# Patient Record
Sex: MALE | Race: WHITE | Employment: OTHER | URBAN - METROPOLITAN AREA
[De-identification: names, ages, dates, MRNs, and addresses within clinical notes are randomized per-mention and may not be internally consistent; named-entity substitution may affect disease eponyms.]

---

## 2017-06-30 ENCOUNTER — TELEPHONE (OUTPATIENT)
Dept: INTERNAL MEDICINE | Age: 72
End: 2017-06-30

## 2017-07-03 DIAGNOSIS — Z00.00 WELL ADULT EXAM: ICD-10-CM

## 2017-07-03 DIAGNOSIS — E78.5 HYPERLIPIDEMIA, UNSPECIFIED HYPERLIPIDEMIA TYPE: Primary | ICD-10-CM

## 2017-07-03 DIAGNOSIS — Z12.5 SCREENING PSA (PROSTATE SPECIFIC ANTIGEN): ICD-10-CM

## 2017-07-03 DIAGNOSIS — E55.9 VITAMIN D DEFICIENCY: ICD-10-CM

## 2017-07-03 DIAGNOSIS — R53.82 CHRONIC FATIGUE: ICD-10-CM

## 2017-07-07 DIAGNOSIS — E55.9 VITAMIN D DEFICIENCY: ICD-10-CM

## 2017-07-07 DIAGNOSIS — Z00.00 WELL ADULT EXAM: ICD-10-CM

## 2017-07-07 DIAGNOSIS — R53.82 CHRONIC FATIGUE: ICD-10-CM

## 2017-07-07 DIAGNOSIS — E78.5 HYPERLIPIDEMIA, UNSPECIFIED HYPERLIPIDEMIA TYPE: ICD-10-CM

## 2017-07-07 LAB
A/G RATIO: 1.6 (ref 1.1–2.2)
ALBUMIN SERPL-MCNC: 4.4 G/DL (ref 3.4–5)
ALP BLD-CCNC: 62 U/L (ref 40–129)
ALT SERPL-CCNC: 15 U/L (ref 10–40)
ANION GAP SERPL CALCULATED.3IONS-SCNC: 11 MMOL/L (ref 3–16)
AST SERPL-CCNC: 15 U/L (ref 15–37)
BASOPHILS ABSOLUTE: 0 K/UL (ref 0–0.2)
BASOPHILS RELATIVE PERCENT: 0.8 %
BILIRUB SERPL-MCNC: 0.7 MG/DL (ref 0–1)
BILIRUBIN URINE: NEGATIVE
BLOOD, URINE: NEGATIVE
BUN BLDV-MCNC: 17 MG/DL (ref 7–20)
CALCIUM SERPL-MCNC: 9.6 MG/DL (ref 8.3–10.6)
CHLORIDE BLD-SCNC: 102 MMOL/L (ref 99–110)
CHOLESTEROL, TOTAL: 202 MG/DL (ref 0–199)
CLARITY: CLEAR
CO2: 28 MMOL/L (ref 21–32)
COLOR: YELLOW
CREAT SERPL-MCNC: 1.1 MG/DL (ref 0.8–1.3)
EOSINOPHILS ABSOLUTE: 0.3 K/UL (ref 0–0.6)
EOSINOPHILS RELATIVE PERCENT: 5.7 %
GFR AFRICAN AMERICAN: >60
GFR NON-AFRICAN AMERICAN: >60
GLOBULIN: 2.7 G/DL
GLUCOSE BLD-MCNC: 102 MG/DL (ref 70–99)
GLUCOSE URINE: NEGATIVE MG/DL
HCT VFR BLD CALC: 45.6 % (ref 40.5–52.5)
HDLC SERPL-MCNC: 47 MG/DL (ref 40–60)
HEMOGLOBIN: 15.4 G/DL (ref 13.5–17.5)
KETONES, URINE: NEGATIVE MG/DL
LDL CHOLESTEROL CALCULATED: 131 MG/DL
LEUKOCYTE ESTERASE, URINE: NEGATIVE
LYMPHOCYTES ABSOLUTE: 1.7 K/UL (ref 1–5.1)
LYMPHOCYTES RELATIVE PERCENT: 30.5 %
MCH RBC QN AUTO: 30.4 PG (ref 26–34)
MCHC RBC AUTO-ENTMCNC: 33.8 G/DL (ref 31–36)
MCV RBC AUTO: 90.1 FL (ref 80–100)
MICROSCOPIC EXAMINATION: NORMAL
MONOCYTES ABSOLUTE: 0.6 K/UL (ref 0–1.3)
MONOCYTES RELATIVE PERCENT: 10 %
NEUTROPHILS ABSOLUTE: 3 K/UL (ref 1.7–7.7)
NEUTROPHILS RELATIVE PERCENT: 53 %
NITRITE, URINE: NEGATIVE
PDW BLD-RTO: 13.4 % (ref 12.4–15.4)
PH UA: 5.5
PLATELET # BLD: 202 K/UL (ref 135–450)
PMV BLD AUTO: 9.7 FL (ref 5–10.5)
POTASSIUM SERPL-SCNC: 4.8 MMOL/L (ref 3.5–5.1)
PROTEIN UA: NEGATIVE MG/DL
RBC # BLD: 5.07 M/UL (ref 4.2–5.9)
SODIUM BLD-SCNC: 141 MMOL/L (ref 136–145)
SPECIFIC GRAVITY UA: 1.02
TOTAL PROTEIN: 7.1 G/DL (ref 6.4–8.2)
TRIGL SERPL-MCNC: 122 MG/DL (ref 0–150)
TSH SERPL DL<=0.05 MIU/L-ACNC: 2.6 UIU/ML (ref 0.27–4.2)
URINE TYPE: NORMAL
UROBILINOGEN, URINE: 0.2 E.U./DL
VITAMIN D 25-HYDROXY: 31.5 NG/ML
VLDLC SERPL CALC-MCNC: 24 MG/DL
WBC # BLD: 5.7 K/UL (ref 4–11)

## 2017-07-14 ENCOUNTER — OFFICE VISIT (OUTPATIENT)
Dept: ENT CLINIC | Age: 72
End: 2017-07-14

## 2017-07-14 ENCOUNTER — OFFICE VISIT (OUTPATIENT)
Dept: INTERNAL MEDICINE | Age: 72
End: 2017-07-14

## 2017-07-14 VITALS
HEIGHT: 72 IN | WEIGHT: 204.4 LBS | SYSTOLIC BLOOD PRESSURE: 120 MMHG | BODY MASS INDEX: 27.68 KG/M2 | HEART RATE: 68 BPM | RESPIRATION RATE: 14 BRPM | DIASTOLIC BLOOD PRESSURE: 82 MMHG

## 2017-07-14 VITALS — BODY MASS INDEX: 26.9 KG/M2 | HEIGHT: 73 IN | TEMPERATURE: 98.1 F | WEIGHT: 203 LBS

## 2017-07-14 DIAGNOSIS — E78.5 HYPERLIPIDEMIA, UNSPECIFIED HYPERLIPIDEMIA TYPE: ICD-10-CM

## 2017-07-14 DIAGNOSIS — N20.0 NEPHROLITHIASIS: ICD-10-CM

## 2017-07-14 DIAGNOSIS — Z00.00 ROUTINE GENERAL MEDICAL EXAMINATION AT A HEALTH CARE FACILITY: ICD-10-CM

## 2017-07-14 DIAGNOSIS — H93.8X1 FULLNESS IN EAR, RIGHT: Primary | ICD-10-CM

## 2017-07-14 DIAGNOSIS — Z00.00 WELL ADULT EXAM: Primary | ICD-10-CM

## 2017-07-14 DIAGNOSIS — E55.9 VITAMIN D DEFICIENCY: ICD-10-CM

## 2017-07-14 PROCEDURE — 99203 OFFICE O/P NEW LOW 30 MIN: CPT | Performed by: OTOLARYNGOLOGY

## 2017-07-14 PROCEDURE — 99214 OFFICE O/P EST MOD 30 MIN: CPT | Performed by: INTERNAL MEDICINE

## 2017-07-14 PROCEDURE — G0439 PPPS, SUBSEQ VISIT: HCPCS | Performed by: INTERNAL MEDICINE

## 2017-07-14 ASSESSMENT — LIFESTYLE VARIABLES
HAVE YOU OR SOMEONE ELSE BEEN INJURED AS A RESULT OF YOUR DRINKING: 0
HOW OFTEN DO YOU HAVE SIX OR MORE DRINKS ON ONE OCCASION: 0
HAS A RELATIVE, FRIEND, DOCTOR, OR ANOTHER HEALTH PROFESSIONAL EXPRESSED CONCERN ABOUT YOUR DRINKING OR SUGGESTED YOU CUT DOWN: 0
AUDIT TOTAL SCORE: 2
HOW MANY STANDARD DRINKS CONTAINING ALCOHOL DO YOU HAVE ON A TYPICAL DAY: 0
HOW OFTEN DURING THE LAST YEAR HAVE YOU FOUND THAT YOU WERE NOT ABLE TO STOP DRINKING ONCE YOU HAD STARTED: 0
AUDIT-C TOTAL SCORE: 2
HOW OFTEN DURING THE LAST YEAR HAVE YOU FAILED TO DO WHAT WAS NORMALLY EXPECTED FROM YOU BECAUSE OF DRINKING: 0
HOW OFTEN DURING THE LAST YEAR HAVE YOU BEEN UNABLE TO REMEMBER WHAT HAPPENED THE NIGHT BEFORE BECAUSE YOU HAD BEEN DRINKING: 0
HOW OFTEN DO YOU HAVE A DRINK CONTAINING ALCOHOL: 2
HOW OFTEN DURING THE LAST YEAR HAVE YOU NEEDED AN ALCOHOLIC DRINK FIRST THING IN THE MORNING TO GET YOURSELF GOING AFTER A NIGHT OF HEAVY DRINKING: 0
HOW OFTEN DURING THE LAST YEAR HAVE YOU HAD A FEELING OF GUILT OR REMORSE AFTER DRINKING: 0

## 2017-07-14 ASSESSMENT — ENCOUNTER SYMPTOMS
GASTROINTESTINAL NEGATIVE: 1
ALLERGIC/IMMUNOLOGIC NEGATIVE: 1
EYES NEGATIVE: 1
RESPIRATORY NEGATIVE: 1

## 2017-07-14 ASSESSMENT — ANXIETY QUESTIONNAIRES: GAD7 TOTAL SCORE: 0

## 2017-07-14 ASSESSMENT — PATIENT HEALTH QUESTIONNAIRE - PHQ9: SUM OF ALL RESPONSES TO PHQ QUESTIONS 1-9: 0

## 2017-10-04 RX ORDER — ATORVASTATIN CALCIUM 40 MG/1
TABLET, FILM COATED ORAL
Qty: 90 TABLET | Refills: 2 | Status: SHIPPED | OUTPATIENT
Start: 2017-10-04 | End: 2018-07-16 | Stop reason: SDUPTHER

## 2018-06-28 ENCOUNTER — TELEPHONE (OUTPATIENT)
Dept: INTERNAL MEDICINE | Age: 73
End: 2018-06-28

## 2018-06-28 DIAGNOSIS — E78.5 HYPERLIPIDEMIA, UNSPECIFIED HYPERLIPIDEMIA TYPE: Primary | ICD-10-CM

## 2018-06-28 DIAGNOSIS — N20.0 NEPHROLITHIASIS: ICD-10-CM

## 2018-06-28 DIAGNOSIS — E55.9 VITAMIN D DEFICIENCY: ICD-10-CM

## 2018-06-28 DIAGNOSIS — R53.82 CHRONIC FATIGUE: ICD-10-CM

## 2018-06-28 DIAGNOSIS — Z00.00 WELL ADULT EXAM: ICD-10-CM

## 2018-07-10 DIAGNOSIS — N20.0 NEPHROLITHIASIS: ICD-10-CM

## 2018-07-10 DIAGNOSIS — E78.5 HYPERLIPIDEMIA, UNSPECIFIED HYPERLIPIDEMIA TYPE: ICD-10-CM

## 2018-07-10 DIAGNOSIS — Z00.00 WELL ADULT EXAM: ICD-10-CM

## 2018-07-10 DIAGNOSIS — E55.9 VITAMIN D DEFICIENCY: ICD-10-CM

## 2018-07-10 DIAGNOSIS — R53.82 CHRONIC FATIGUE: ICD-10-CM

## 2018-07-10 LAB
A/G RATIO: 1.6 (ref 1.1–2.2)
ALBUMIN SERPL-MCNC: 4.1 G/DL (ref 3.4–5)
ALP BLD-CCNC: 58 U/L (ref 40–129)
ALT SERPL-CCNC: 13 U/L (ref 10–40)
ANION GAP SERPL CALCULATED.3IONS-SCNC: 10 MMOL/L (ref 3–16)
AST SERPL-CCNC: 15 U/L (ref 15–37)
BILIRUB SERPL-MCNC: 0.5 MG/DL (ref 0–1)
BILIRUBIN URINE: NEGATIVE
BLOOD, URINE: NEGATIVE
BUN BLDV-MCNC: 19 MG/DL (ref 7–20)
CALCIUM SERPL-MCNC: 9 MG/DL (ref 8.3–10.6)
CHLORIDE BLD-SCNC: 104 MMOL/L (ref 99–110)
CHOLESTEROL, TOTAL: 240 MG/DL (ref 0–199)
CLARITY: CLEAR
CO2: 28 MMOL/L (ref 21–32)
COLOR: NORMAL
CREAT SERPL-MCNC: 1.3 MG/DL (ref 0.8–1.3)
EPITHELIAL CELLS, UA: 1 /HPF (ref 0–5)
GFR AFRICAN AMERICAN: >60
GFR NON-AFRICAN AMERICAN: 54
GLOBULIN: 2.6 G/DL
GLUCOSE BLD-MCNC: 103 MG/DL (ref 70–99)
GLUCOSE URINE: NEGATIVE MG/DL
HCT VFR BLD CALC: 42.4 % (ref 40.5–52.5)
HDLC SERPL-MCNC: 45 MG/DL (ref 40–60)
HEMOGLOBIN: 14.7 G/DL (ref 13.5–17.5)
HYALINE CASTS: 2 /LPF (ref 0–8)
KETONES, URINE: NEGATIVE MG/DL
LDL CHOLESTEROL CALCULATED: 161 MG/DL
LEUKOCYTE ESTERASE, URINE: NEGATIVE
MCH RBC QN AUTO: 30.7 PG (ref 26–34)
MCHC RBC AUTO-ENTMCNC: 34.6 G/DL (ref 31–36)
MCV RBC AUTO: 88.7 FL (ref 80–100)
MICROSCOPIC EXAMINATION: NORMAL
NITRITE, URINE: NEGATIVE
PDW BLD-RTO: 13.6 % (ref 12.4–15.4)
PH UA: 5.5
PLATELET # BLD: 200 K/UL (ref 135–450)
PMV BLD AUTO: 9.4 FL (ref 5–10.5)
POTASSIUM SERPL-SCNC: 4.8 MMOL/L (ref 3.5–5.1)
PROTEIN UA: NEGATIVE MG/DL
RBC # BLD: 4.78 M/UL (ref 4.2–5.9)
RBC UA: 2 /HPF (ref 0–4)
SODIUM BLD-SCNC: 142 MMOL/L (ref 136–145)
SPECIFIC GRAVITY UA: 1.03
TOTAL PROTEIN: 6.7 G/DL (ref 6.4–8.2)
TRIGL SERPL-MCNC: 168 MG/DL (ref 0–150)
TSH REFLEX: 2.22 UIU/ML (ref 0.27–4.2)
UROBILINOGEN, URINE: 0.2 E.U./DL
VITAMIN D 25-HYDROXY: 36.9 NG/ML
VLDLC SERPL CALC-MCNC: 34 MG/DL
WBC # BLD: 5.5 K/UL (ref 4–11)
WBC UA: 1 /HPF (ref 0–5)

## 2018-07-16 ENCOUNTER — OFFICE VISIT (OUTPATIENT)
Dept: INTERNAL MEDICINE | Age: 73
End: 2018-07-16

## 2018-07-16 VITALS
DIASTOLIC BLOOD PRESSURE: 78 MMHG | HEART RATE: 68 BPM | BODY MASS INDEX: 25.8 KG/M2 | SYSTOLIC BLOOD PRESSURE: 118 MMHG | RESPIRATION RATE: 16 BRPM | WEIGHT: 201 LBS | HEIGHT: 74 IN

## 2018-07-16 DIAGNOSIS — E78.5 HYPERLIPIDEMIA, UNSPECIFIED HYPERLIPIDEMIA TYPE: ICD-10-CM

## 2018-07-16 DIAGNOSIS — Z00.00 WELL ADULT EXAM: Primary | ICD-10-CM

## 2018-07-16 DIAGNOSIS — Z12.12 SCREENING FOR MALIGNANT NEOPLASM OF THE RECTUM: ICD-10-CM

## 2018-07-16 DIAGNOSIS — N20.0 NEPHROLITHIASIS: ICD-10-CM

## 2018-07-16 DIAGNOSIS — R53.82 CHRONIC FATIGUE: ICD-10-CM

## 2018-07-16 DIAGNOSIS — Z00.00 ROUTINE GENERAL MEDICAL EXAMINATION AT A HEALTH CARE FACILITY: ICD-10-CM

## 2018-07-16 DIAGNOSIS — E55.9 VITAMIN D DEFICIENCY: ICD-10-CM

## 2018-07-16 DIAGNOSIS — R42 VERTIGO: ICD-10-CM

## 2018-07-16 LAB
HEMOCCULT STL QL: NEGATIVE

## 2018-07-16 PROCEDURE — 1036F TOBACCO NON-USER: CPT | Performed by: INTERNAL MEDICINE

## 2018-07-16 PROCEDURE — 4040F PNEUMOC VAC/ADMIN/RCVD: CPT | Performed by: INTERNAL MEDICINE

## 2018-07-16 PROCEDURE — G8427 DOCREV CUR MEDS BY ELIG CLIN: HCPCS | Performed by: INTERNAL MEDICINE

## 2018-07-16 PROCEDURE — 82270 OCCULT BLOOD FECES: CPT | Performed by: INTERNAL MEDICINE

## 2018-07-16 PROCEDURE — 1123F ACP DISCUSS/DSCN MKR DOCD: CPT | Performed by: INTERNAL MEDICINE

## 2018-07-16 PROCEDURE — G0439 PPPS, SUBSEQ VISIT: HCPCS | Performed by: INTERNAL MEDICINE

## 2018-07-16 PROCEDURE — 1101F PT FALLS ASSESS-DOCD LE1/YR: CPT | Performed by: INTERNAL MEDICINE

## 2018-07-16 PROCEDURE — 99214 OFFICE O/P EST MOD 30 MIN: CPT | Performed by: INTERNAL MEDICINE

## 2018-07-16 PROCEDURE — G8419 CALC BMI OUT NRM PARAM NOF/U: HCPCS | Performed by: INTERNAL MEDICINE

## 2018-07-16 PROCEDURE — 3017F COLORECTAL CA SCREEN DOC REV: CPT | Performed by: INTERNAL MEDICINE

## 2018-07-16 RX ORDER — ATORVASTATIN CALCIUM 20 MG/1
TABLET, FILM COATED ORAL
Qty: 90 TABLET | Refills: 1 | Status: SHIPPED | OUTPATIENT
Start: 2018-07-16 | End: 2019-01-09 | Stop reason: DRUGHIGH

## 2018-07-16 RX ORDER — MELOXICAM 7.5 MG/1
7.5 TABLET ORAL DAILY
Qty: 30 TABLET | Refills: 3 | Status: SHIPPED | OUTPATIENT
Start: 2018-07-16 | End: 2019-03-12 | Stop reason: SDUPTHER

## 2018-07-16 RX ORDER — ACETAMINOPHEN, ASPIRIN AND CAFFEINE 250; 250; 65 MG/1; MG/1; MG/1
1 TABLET, FILM COATED ORAL EVERY 6 HOURS PRN
COMMUNITY
End: 2021-02-17 | Stop reason: ALTCHOICE

## 2018-07-16 RX ORDER — HYDROCODONE BITARTRATE AND ACETAMINOPHEN 5; 325 MG/1; MG/1
1 TABLET ORAL EVERY 6 HOURS PRN
Qty: 28 TABLET | Refills: 0 | Status: SHIPPED | OUTPATIENT
Start: 2018-07-16 | End: 2018-07-23

## 2018-07-16 ASSESSMENT — PATIENT HEALTH QUESTIONNAIRE - PHQ9: SUM OF ALL RESPONSES TO PHQ QUESTIONS 1-9: 0

## 2018-07-16 ASSESSMENT — ANXIETY QUESTIONNAIRES: GAD7 TOTAL SCORE: 0

## 2018-07-16 ASSESSMENT — ENCOUNTER SYMPTOMS
EYES NEGATIVE: 1
RESPIRATORY NEGATIVE: 1
GASTROINTESTINAL NEGATIVE: 1
ALLERGIC/IMMUNOLOGIC NEGATIVE: 1

## 2018-07-16 ASSESSMENT — LIFESTYLE VARIABLES
HOW OFTEN DO YOU HAVE A DRINK CONTAINING ALCOHOL: 2
HOW OFTEN DO YOU HAVE SIX OR MORE DRINKS ON ONE OCCASION: 0

## 2018-07-16 NOTE — PROGRESS NOTES
Occupational History    Not on file. Social History Main Topics    Smoking status: Never Smoker    Smokeless tobacco: Never Used    Alcohol use Yes      Comment: OCCASIONALLY    Drug use: No    Sexual activity: Not on file     Other Topics Concern    Not on file     Social History Narrative    No narrative on file       Review of Systems  Review of Systems   Constitutional: Positive for unexpected weight change (down a few # ). HENT: Negative. Eyes: Negative. Glasses  Early cataracts    Respiratory: Negative. Cardiovascular: Negative. Gastrointestinal: Negative. Colonoscopy was ok repeat 2023   Endocrine: Negative. Genitourinary: Positive for frequency (occ nocturia ). Episode of renal colic as noted prn meds    Musculoskeletal: Positive for arthralgias (occ knee pain ). Skin: Negative. Allergic/Immunologic: Negative. Neurological: Positive for dizziness (occ sx  occ balance . sx ). Hematological: Negative. Psychiatric/Behavioral: Negative. Objective:   Physical Exam:  Physical Exam   Constitutional: He is oriented to person, place, and time. He appears well-developed and well-nourished. HENT:   Head: Normocephalic and atraumatic. Right Ear: External ear normal.   Left Ear: External ear normal.   Nose: Nose normal.   Mouth/Throat: Oropharynx is clear and moist.   Eyes: Conjunctivae and EOM are normal. Pupils are equal, round, and reactive to light. Neck: Normal range of motion. Neck supple. No tracheal deviation present. No thyromegaly present. Cardiovascular: Normal rate, regular rhythm, normal heart sounds and intact distal pulses. Pulmonary/Chest: Effort normal and breath sounds normal.   Abdominal: Soft. Bowel sounds are normal.   Genitourinary: Prostate normal and penis normal. Rectal exam shows guaiac negative stool. No penile tenderness. Musculoskeletal: Normal range of motion.    Neurological: He is alert and oriented to person, place, and time. He has normal reflexes. Skin: Skin is warm and dry. Psychiatric: He has a normal mood and affect. His behavior is normal.       /78 (Site: Right Arm, Position: Sitting, Cuff Size: Medium Adult)   Pulse 68   Resp 16   Ht 6' 2\" (1.88 m)   Wt 201 lb (91.2 kg)   BMI 25.81 kg/m²       Assessment & Plan:          Chronic fatigue  Last labs ok     Hyperlipidemia  Off med x 1 to start Lipitor at 20 mg again Repeat labs in 6 months      Nephrolithiasis  No recent symptoms  To have some Norco on hand will refill     Vitamin D deficiency  Continue current meds     Vertigo  Send to [facility] center consider ENT     Well adult exam  Within normal limits for age- cont to work no ADL issues,immunizations up to date, no depression ,no cognitive impairment  Colonoscopy up to date  Eye exam up to date  Exercises as tolerated    No living will but does not want resuscitation   Findings and recommendations discussed with Pt      Ishmael Hodge  1945    No Known Allergies  Current Outpatient Prescriptions   Medication Sig Dispense Refill    aspirin-acetaminophen-caffeine (EXCEDRIN MIGRAINE) 250-250-65 MG per tablet Take 1 tablet by mouth every 6 hours as needed for Headaches      atorvastatin (LIPITOR) 20 MG tablet TAKE ONE TABLET BY MOUTH DAILY 90 tablet 1    meloxicam (MOBIC) 7.5 MG tablet Take 1 tablet by mouth daily 30 tablet 3    HYDROcodone-acetaminophen (NORCO) 5-325 MG per tablet Take 1 tablet by mouth every 6 hours as needed for Pain for up to 7 days. . 28 tablet 0    ondansetron (ZOFRAN) 4 MG tablet Take 1 tablet by mouth every 8 hours as needed for Nausea. 6 tablet 2    Vitamin D (CHOLECALCIFEROL) 1000 UNITS CAPS capsule Take 1,000 Units by mouth daily.  aspirin 81 MG EC tablet Take 81 mg by mouth daily.  Saw [place] 450 MG CAPS Take 1 tablet by mouth 2 times daily.         Misc Natural Products (OSTEO BI-FLEX ADV TRIPLE ST) TABS Take 1 tablet by mouth 2 times daily.        Coenzyme Q-10 100 MG CAPS Take 1 tablet by mouth daily.  Multiple Vitamin (MULTI VITAMIN MENS PO) Take  by mouth. No current facility-administered medications for this visit. Vitals:    07/16/18 1414   BP: 118/78   Site: Right Arm   Position: Sitting   Cuff Size: Medium Adult   Pulse: 68   Resp: 16   Weight: 201 lb (91.2 kg)   Height: 6' 2\" (1.88 m)     Body mass index is 25.81 kg/m². Wt Readings from Last 3 Encounters:   07/16/18 201 lb (91.2 kg)   07/14/17 203 lb (92.1 kg)   07/14/17 204 lb 6.4 oz (92.7 kg)     BP Readings from Last 3 Encounters:   07/16/18 118/78   07/14/17 120/82   07/12/16 122/68       Consultants:   Patient Care Team:  Tyler Robles MD as PCP - General (Internal Medicine)  Papito Ramirez DPM as Consulting Physician (Podiatry)  Kendell Gates MD as Consulting Physician (Otolaryngology)    Chief Complaint:   Lois Townsend is a 81 y.o. male who presents for Medicare Preventive Physical Examination with Personalized Prevention Plan Services. HPI: Tr review listed chronic problems     Patient Active Problem List   Diagnosis    Hyperlipidemia    Well adult exam    Vitamin D deficiency    Nephrolithiasis    Chronic fatigue    Vertigo       Mood Disorders Screen:  Risk factors: none  Symptoms:  endorses none, denies depressed mood, difficulty concentrating, difficulty sleeping, reduced interest in activities and changes in appetite     Safety Assessment:  Functional ability/ADLs:  Difficulty with bathing- no, grooming- no, meals- no, incontinence- no.  Driving- yes. Home safety: Lives with family wife. Number of stairs to enter home: 2, within home: 12 between floors. Risk factors for falls: vestibular disturbance vertigo. Home environment modifications:  no.     End of Life Planning:  Advanced Directive: has an advanced directive - a copy has been provided.       Preventive Care:  Self-testicular exams: Yes  Last PSA: 1.26, normal  Last tablet by mouth daily      Medicare Annual Wellness Visit  Name: Liliana Torres Date: 2018   MRN: [id number] Sex: Male   Age: 81 y.o. Ethnicity: Declined   : 1945 Race: Chance Vincent is here for Medicare AWV and Discuss Labs    Screenings for behavioral, psychosocial and functional/safety risks, and cognitive dysfunction are all negative except as indicated below. These results, as well as other patient data from the [address] form, are documented in Flowsheets linked to this Encounter. No Known Allergies  Prior to Visit Medications    Medication Sig Taking? Authorizing Provider   aspirin-acetaminophen-caffeine (EXCEDRIN MIGRAINE) 105-035-94 MG per tablet Take 1 tablet by mouth every 6 hours as needed for Headaches Yes Historical Provider, MD   atorvastatin (LIPITOR) 20 MG tablet TAKE ONE TABLET BY MOUTH DAILY Yes Ellaree Riedel, MD   meloxicam (MOBIC) 7.5 MG tablet Take 1 tablet by mouth daily Yes Ellaree Riedel, MD   HYDROcodone-acetaminophen (NORCO) 5-325 MG per tablet Take 1 tablet by mouth every 6 hours as needed for Pain for up to 7 days. Samuel Heard MD   ondansetron (ZOFRAN) 4 MG tablet Take 1 tablet by mouth every 8 hours as needed for Nausea. Yes Josy Dudley MD   Vitamin D (CHOLECALCIFEROL) 1000 UNITS CAPS capsule Take 1,000 Units by mouth daily. Yes Historical Provider, MD   aspirin 81 MG EC tablet Take 81 mg by mouth daily. Yes Historical Provider, MD [place] 450 MG CAPS Take 1 tablet by mouth 2 times daily. Yes Historical Provider, MD   Misc Natural Products (OSTEO BI-FLEX ADV TRIPLE ST) TABS Take 1 tablet by mouth 2 times daily. Yes Historical Provider, MD   Coenzyme Q-10 100 MG CAPS Take 1 tablet by mouth daily. Yes Historical Provider, MD   Multiple Vitamin (MULTI VITAMIN MENS PO) Take  by mouth.    Yes Historical Provider, MD     Past Medical History:   Diagnosis Date    Cephalgia     Hyperlipidemia     Left knee pain Past Surgical History:   Procedure Laterality Date    COLONOSCOPY  3/2013    normal Dr Diamond Suarez repeat 2023    KNEE CARTILAGE SURGERY  1980\"S    LEFT KNEE    PATELLA SURGERY  1980'S    LEFT    TONSILLECTOMY AND ADENOIDECTOMY  1952     Family History   Problem Relation Age of Onset    Cancer Father         LIVER       CareTeam (Including outside providers/suppliers regularly involved in providing care):   Patient Care Team:  Abel Meneses MD as PCP - General (Internal Medicine)  Keisha Miller DPM as Consulting Physician (Podiatry)  Cary Mirza MD as Consulting Physician (Otolaryngology)    Wt Readings from Last 3 Encounters:   07/16/18 201 lb (91.2 kg)   07/14/17 203 lb (92.1 kg)   07/14/17 204 lb 6.4 oz (92.7 kg)     Vitals:    07/16/18 1414   BP: 118/78   Site: Right Arm   Position: Sitting   Cuff Size: Medium Adult   Pulse: 68   Resp: 16   Weight: 201 lb (91.2 kg)   Height: 6' 2\" (1.88 m)       See above    Patient's complete Health Risk Assessment and screening values have been reviewed and are found in Flowsheets. The following problems were reviewed today and where indicated follow up appointments were made and/or referrals ordered. Positive Risk Factor Screenings with Interventions:     Health Habits/Nutrition:  Health Habits/Nutrition  Do you exercise for at least 20 minutes 2-3 times per week?: Yes  Have you lost any weight without trying in the past 3 months?: (!) Yes  Do you eat fewer than 2 meals per day?: No  Have you seen a dentist within the past year?: Yes  Body mass index is 25.81 kg/m².   Health Habits/Nutrition Interventions:  · None indicated    Hearing/Vision:  Hearing/Vision  Do you or your family notice any trouble with your hearing?: (!) Yes  Do you have difficulty driving, watching TV, or doing any of your daily activities because of your eyesight?: (!) Yes  Have you had an eye exam within the past year?: (!) No  Hearing/Vision Interventions:  · None

## 2018-07-16 NOTE — PATIENT INSTRUCTIONS
Personalized Preventive Plan for Lobo Luis - 7/16/2018  Medicare offers a range of preventive health benefits. Some of the tests and screenings are paid in full while other may be subject to a deductible, co-insurance, and/or copay. Some of these benefits include a comprehensive review of your medical history including lifestyle, illnesses that may run in your family, and various assessments and screenings as appropriate. After reviewing your medical record and screening and assessments performed today your provider may have ordered immunizations, labs, imaging, and/or referrals for you. A list of these orders (if applicable) as well as your Preventive Care list are included within your After Visit Summary for your review. Other Preventive Recommendations:    · A preventive eye exam performed by an eye specialist is recommended every 1-2 years to screen for glaucoma; cataracts, macular degeneration, and other eye disorders. · A preventive dental visit is recommended every 6 months. · Try to get at least 150 minutes of exercise per week or 10,000 steps per day on a pedometer . · Order or download the FREE \"Exercise & Physical Activity: Your Everyday Guide\" from [employer]. Call 4-970.701.2438 or search [employer] online. · You need 7643-5829 mg of calcium and 9380-3234 IU of vitamin D per day. It is possible to meet your calcium requirement with diet alone, but a vitamin D supplement is usually necessary to meet this goal.  · When exposed to the sun, use a sunscreen that protects against both UVA and UVB radiation with an SPF of 30 or greater. Reapply every 2 to 3 hours or after sweating, drying off with a towel, or swimming. · Always wear a seat belt when traveling in a car. Always wear a helmet when riding a bicycle or motorcycle.

## 2018-08-27 ENCOUNTER — TELEPHONE (OUTPATIENT)
Dept: INTERNAL MEDICINE | Age: 73
End: 2018-08-27

## 2018-08-27 NOTE — TELEPHONE ENCOUNTER
Pt states he would like to discuss meloxicam (MOBIC) 7.5 MG tablet   The only information pt gave was he is exp symptoms.

## 2018-11-30 ENCOUNTER — TELEPHONE (OUTPATIENT)
Dept: INTERNAL MEDICINE CLINIC | Age: 73
End: 2018-11-30

## 2018-11-30 DIAGNOSIS — E55.9 VITAMIN D DEFICIENCY: ICD-10-CM

## 2018-11-30 DIAGNOSIS — E78.5 HYPERLIPIDEMIA, UNSPECIFIED HYPERLIPIDEMIA TYPE: Primary | ICD-10-CM

## 2018-11-30 DIAGNOSIS — R53.82 CHRONIC FATIGUE: ICD-10-CM

## 2018-11-30 DIAGNOSIS — N20.0 NEPHROLITHIASIS: ICD-10-CM

## 2018-11-30 DIAGNOSIS — Z12.5 SCREENING PSA (PROSTATE SPECIFIC ANTIGEN): ICD-10-CM

## 2018-11-30 NOTE — TELEPHONE ENCOUNTER
This message was an FYI, patient is ok no complaints at his time. Explained to patient that a x-ray is typically not a routine test and would need a dx to cover. Patient understood. Dr. Claudia Reynolds patient has not had blood work done since 7/2018. Per Dr. Claudia Reynolds ok to do blood work now and schedule physical when due. Orders are placed in [facility] and Patient advised.

## 2019-01-02 DIAGNOSIS — R53.82 CHRONIC FATIGUE: ICD-10-CM

## 2019-01-02 DIAGNOSIS — N20.0 NEPHROLITHIASIS: ICD-10-CM

## 2019-01-02 DIAGNOSIS — Z12.5 SCREENING PSA (PROSTATE SPECIFIC ANTIGEN): ICD-10-CM

## 2019-01-02 DIAGNOSIS — E78.5 HYPERLIPIDEMIA, UNSPECIFIED HYPERLIPIDEMIA TYPE: ICD-10-CM

## 2019-01-02 DIAGNOSIS — E55.9 VITAMIN D DEFICIENCY: ICD-10-CM

## 2019-01-02 LAB
A/G RATIO: 1.7 (ref 1.1–2.2)
ALBUMIN SERPL-MCNC: 4.3 G/DL (ref 3.4–5)
ALP BLD-CCNC: 56 U/L (ref 40–129)
ALT SERPL-CCNC: 22 U/L (ref 10–40)
ANION GAP SERPL CALCULATED.3IONS-SCNC: 11 MMOL/L (ref 3–16)
AST SERPL-CCNC: 19 U/L (ref 15–37)
BASOPHILS ABSOLUTE: 0 K/UL (ref 0–0.2)
BASOPHILS RELATIVE PERCENT: 0.8 %
BILIRUB SERPL-MCNC: 0.7 MG/DL (ref 0–1)
BILIRUBIN URINE: NEGATIVE
BLOOD, URINE: NEGATIVE
BUN BLDV-MCNC: 18 MG/DL (ref 7–20)
CALCIUM SERPL-MCNC: 9.5 MG/DL (ref 8.3–10.6)
CHLORIDE BLD-SCNC: 104 MMOL/L (ref 99–110)
CHOLESTEROL, TOTAL: 221 MG/DL (ref 0–199)
CLARITY: CLEAR
CO2: 29 MMOL/L (ref 21–32)
COLOR: YELLOW
CREAT SERPL-MCNC: 1.1 MG/DL (ref 0.8–1.3)
EOSINOPHILS ABSOLUTE: 0.3 K/UL (ref 0–0.6)
EOSINOPHILS RELATIVE PERCENT: 6.5 %
GFR AFRICAN AMERICAN: >60
GFR NON-AFRICAN AMERICAN: >60
GLOBULIN: 2.5 G/DL
GLUCOSE BLD-MCNC: 103 MG/DL (ref 70–99)
GLUCOSE URINE: NEGATIVE MG/DL
HCT VFR BLD CALC: 45.5 % (ref 40.5–52.5)
HDLC SERPL-MCNC: 48 MG/DL (ref 40–60)
HEMOGLOBIN: 15.3 G/DL (ref 13.5–17.5)
KETONES, URINE: NEGATIVE MG/DL
LDL CHOLESTEROL CALCULATED: 154 MG/DL
LEUKOCYTE ESTERASE, URINE: NEGATIVE
LYMPHOCYTES ABSOLUTE: 1.7 K/UL (ref 1–5.1)
LYMPHOCYTES RELATIVE PERCENT: 34.2 %
MCH RBC QN AUTO: 30.2 PG (ref 26–34)
MCHC RBC AUTO-ENTMCNC: 33.7 G/DL (ref 31–36)
MCV RBC AUTO: 89.7 FL (ref 80–100)
MICROSCOPIC EXAMINATION: NORMAL
MONOCYTES ABSOLUTE: 0.5 K/UL (ref 0–1.3)
MONOCYTES RELATIVE PERCENT: 9.7 %
NEUTROPHILS ABSOLUTE: 2.5 K/UL (ref 1.7–7.7)
NEUTROPHILS RELATIVE PERCENT: 48.8 %
NITRITE, URINE: NEGATIVE
PDW BLD-RTO: 13.3 % (ref 12.4–15.4)
PH UA: 5.5
PLATELET # BLD: 209 K/UL (ref 135–450)
PMV BLD AUTO: 9.5 FL (ref 5–10.5)
POTASSIUM SERPL-SCNC: 4.8 MMOL/L (ref 3.5–5.1)
PROSTATE SPECIFIC ANTIGEN: 2.1 NG/ML (ref 0–4)
PROTEIN UA: NEGATIVE MG/DL
RBC # BLD: 5.07 M/UL (ref 4.2–5.9)
SODIUM BLD-SCNC: 144 MMOL/L (ref 136–145)
SPECIFIC GRAVITY UA: 1.02
TOTAL PROTEIN: 6.8 G/DL (ref 6.4–8.2)
TRIGL SERPL-MCNC: 97 MG/DL (ref 0–150)
TSH REFLEX: 3.58 UIU/ML (ref 0.27–4.2)
URINE REFLEX TO CULTURE: NORMAL
URINE TYPE: NORMAL
UROBILINOGEN, URINE: 0.2 E.U./DL
VITAMIN D 25-HYDROXY: 35.6 NG/ML
VLDLC SERPL CALC-MCNC: 19 MG/DL
WBC # BLD: 5.1 K/UL (ref 4–11)

## 2019-01-09 ENCOUNTER — TELEPHONE (OUTPATIENT)
Dept: INTERNAL MEDICINE CLINIC | Age: 74
End: 2019-01-09

## 2019-01-09 RX ORDER — ATORVASTATIN CALCIUM 40 MG/1
TABLET, FILM COATED ORAL
Status: SHIPPED | COMMUNITY
Start: 2019-01-09 | End: 2019-10-15 | Stop reason: SDUPTHER

## 2019-03-12 RX ORDER — MELOXICAM 7.5 MG/1
TABLET ORAL
Qty: 30 TABLET | Refills: 2 | Status: SHIPPED | OUTPATIENT
Start: 2019-03-12 | End: 2019-09-03 | Stop reason: SDUPTHER

## 2019-05-15 ENCOUNTER — TELEPHONE (OUTPATIENT)
Dept: INTERNAL MEDICINE CLINIC | Age: 74
End: 2019-05-15

## 2019-05-15 DIAGNOSIS — E55.9 VITAMIN D DEFICIENCY: ICD-10-CM

## 2019-05-15 DIAGNOSIS — E78.5 HYPERLIPIDEMIA, UNSPECIFIED HYPERLIPIDEMIA TYPE: Primary | ICD-10-CM

## 2019-05-15 DIAGNOSIS — R53.82 CHRONIC FATIGUE: ICD-10-CM

## 2019-05-15 DIAGNOSIS — N20.0 NEPHROLITHIASIS: ICD-10-CM

## 2019-05-15 DIAGNOSIS — Z12.5 SCREENING PSA (PROSTATE SPECIFIC ANTIGEN): ICD-10-CM

## 2019-05-15 NOTE — TELEPHONE ENCOUNTER
Patient scheduled his physical on 07/24/19. Patient would like to have his labs placed prior.      Please review and advise

## 2019-05-20 ENCOUNTER — TELEPHONE (OUTPATIENT)
Dept: INTERNAL MEDICINE CLINIC | Age: 74
End: 2019-05-20

## 2019-05-20 NOTE — TELEPHONE ENCOUNTER
Patient called stating he is having issues with his left knee again and wanted to know if he needs a referral to see a orthopaedic? Please call to advise.

## 2019-07-11 DIAGNOSIS — E78.5 HYPERLIPIDEMIA, UNSPECIFIED HYPERLIPIDEMIA TYPE: ICD-10-CM

## 2019-07-11 DIAGNOSIS — R53.82 CHRONIC FATIGUE: ICD-10-CM

## 2019-07-11 DIAGNOSIS — Z12.5 SCREENING PSA (PROSTATE SPECIFIC ANTIGEN): ICD-10-CM

## 2019-07-11 DIAGNOSIS — N20.0 NEPHROLITHIASIS: ICD-10-CM

## 2019-07-11 DIAGNOSIS — E55.9 VITAMIN D DEFICIENCY: ICD-10-CM

## 2019-07-11 LAB
A/G RATIO: 1.9 (ref 1.1–2.2)
ALBUMIN SERPL-MCNC: 4.6 G/DL (ref 3.4–5)
ALP BLD-CCNC: 59 U/L (ref 40–129)
ALT SERPL-CCNC: 13 U/L (ref 10–40)
ANION GAP SERPL CALCULATED.3IONS-SCNC: 10 MMOL/L (ref 3–16)
AST SERPL-CCNC: 14 U/L (ref 15–37)
BASOPHILS ABSOLUTE: 0 K/UL (ref 0–0.2)
BASOPHILS RELATIVE PERCENT: 0.9 %
BILIRUB SERPL-MCNC: 0.9 MG/DL (ref 0–1)
BUN BLDV-MCNC: 25 MG/DL (ref 7–20)
CALCIUM SERPL-MCNC: 10 MG/DL (ref 8.3–10.6)
CHLORIDE BLD-SCNC: 103 MMOL/L (ref 99–110)
CHOLESTEROL, TOTAL: 216 MG/DL (ref 0–199)
CO2: 28 MMOL/L (ref 21–32)
CREAT SERPL-MCNC: 1.1 MG/DL (ref 0.8–1.3)
EOSINOPHILS ABSOLUTE: 0.4 K/UL (ref 0–0.6)
EOSINOPHILS RELATIVE PERCENT: 6.9 %
GFR AFRICAN AMERICAN: >60
GFR NON-AFRICAN AMERICAN: >60
GLOBULIN: 2.4 G/DL
GLUCOSE BLD-MCNC: 97 MG/DL (ref 70–99)
HCT VFR BLD CALC: 45 % (ref 40.5–52.5)
HDLC SERPL-MCNC: 55 MG/DL (ref 40–60)
HEMOGLOBIN: 15 G/DL (ref 13.5–17.5)
LDL CHOLESTEROL CALCULATED: 142 MG/DL
LYMPHOCYTES ABSOLUTE: 1.6 K/UL (ref 1–5.1)
LYMPHOCYTES RELATIVE PERCENT: 30.7 %
MCH RBC QN AUTO: 30.5 PG (ref 26–34)
MCHC RBC AUTO-ENTMCNC: 33.4 G/DL (ref 31–36)
MCV RBC AUTO: 91.4 FL (ref 80–100)
MONOCYTES ABSOLUTE: 0.5 K/UL (ref 0–1.3)
MONOCYTES RELATIVE PERCENT: 9.8 %
NEUTROPHILS ABSOLUTE: 2.7 K/UL (ref 1.7–7.7)
NEUTROPHILS RELATIVE PERCENT: 51.7 %
PDW BLD-RTO: 13.3 % (ref 12.4–15.4)
PLATELET # BLD: 221 K/UL (ref 135–450)
PMV BLD AUTO: 9.5 FL (ref 5–10.5)
POTASSIUM SERPL-SCNC: 4.8 MMOL/L (ref 3.5–5.1)
PROSTATE SPECIFIC ANTIGEN: 2.5 NG/ML (ref 0–4)
RBC # BLD: 4.92 M/UL (ref 4.2–5.9)
SODIUM BLD-SCNC: 141 MMOL/L (ref 136–145)
TOTAL PROTEIN: 7 G/DL (ref 6.4–8.2)
TRIGL SERPL-MCNC: 97 MG/DL (ref 0–150)
TSH REFLEX: 2.03 UIU/ML (ref 0.27–4.2)
VITAMIN D 25-HYDROXY: 52.5 NG/ML
VLDLC SERPL CALC-MCNC: 19 MG/DL
WBC # BLD: 5.3 K/UL (ref 4–11)

## 2019-07-12 DIAGNOSIS — N20.0 NEPHROLITHIASIS: ICD-10-CM

## 2019-07-12 DIAGNOSIS — R53.82 CHRONIC FATIGUE: ICD-10-CM

## 2019-07-12 DIAGNOSIS — E78.5 HYPERLIPIDEMIA, UNSPECIFIED HYPERLIPIDEMIA TYPE: ICD-10-CM

## 2019-07-12 LAB
BILIRUBIN URINE: NEGATIVE
BLOOD, URINE: NEGATIVE
CLARITY: CLEAR
COLOR: NORMAL
GLUCOSE URINE: NEGATIVE MG/DL
KETONES, URINE: NEGATIVE MG/DL
LEUKOCYTE ESTERASE, URINE: NEGATIVE
MICROSCOPIC EXAMINATION: NORMAL
NITRITE, URINE: NEGATIVE
PH UA: 5.5 (ref 5–8)
PROTEIN UA: NEGATIVE MG/DL
SPECIFIC GRAVITY UA: >1.03 (ref 1–1.03)
URINE REFLEX TO CULTURE: NORMAL
URINE TYPE: NORMAL
UROBILINOGEN, URINE: 0.2 E.U./DL

## 2019-07-24 ENCOUNTER — OFFICE VISIT (OUTPATIENT)
Dept: INTERNAL MEDICINE CLINIC | Age: 74
End: 2019-07-24
Payer: MEDICARE

## 2019-07-24 VITALS
BODY MASS INDEX: 26.09 KG/M2 | HEIGHT: 72 IN | SYSTOLIC BLOOD PRESSURE: 110 MMHG | WEIGHT: 192.6 LBS | HEART RATE: 68 BPM | RESPIRATION RATE: 14 BRPM | DIASTOLIC BLOOD PRESSURE: 70 MMHG

## 2019-07-24 DIAGNOSIS — Z00.00 WELL ADULT EXAM: Primary | ICD-10-CM

## 2019-07-24 DIAGNOSIS — E55.9 VITAMIN D DEFICIENCY: ICD-10-CM

## 2019-07-24 DIAGNOSIS — Z00.00 ROUTINE GENERAL MEDICAL EXAMINATION AT A HEALTH CARE FACILITY: ICD-10-CM

## 2019-07-24 DIAGNOSIS — R53.82 CHRONIC FATIGUE: ICD-10-CM

## 2019-07-24 DIAGNOSIS — N20.0 NEPHROLITHIASIS: ICD-10-CM

## 2019-07-24 DIAGNOSIS — R42 VERTIGO: ICD-10-CM

## 2019-07-24 DIAGNOSIS — E78.5 HYPERLIPIDEMIA, UNSPECIFIED HYPERLIPIDEMIA TYPE: ICD-10-CM

## 2019-07-24 PROCEDURE — 4040F PNEUMOC VAC/ADMIN/RCVD: CPT | Performed by: INTERNAL MEDICINE

## 2019-07-24 PROCEDURE — 99214 OFFICE O/P EST MOD 30 MIN: CPT | Performed by: INTERNAL MEDICINE

## 2019-07-24 PROCEDURE — 1123F ACP DISCUSS/DSCN MKR DOCD: CPT | Performed by: INTERNAL MEDICINE

## 2019-07-24 PROCEDURE — 1036F TOBACCO NON-USER: CPT | Performed by: INTERNAL MEDICINE

## 2019-07-24 PROCEDURE — G8419 CALC BMI OUT NRM PARAM NOF/U: HCPCS | Performed by: INTERNAL MEDICINE

## 2019-07-24 PROCEDURE — G0439 PPPS, SUBSEQ VISIT: HCPCS | Performed by: INTERNAL MEDICINE

## 2019-07-24 PROCEDURE — 3017F COLORECTAL CA SCREEN DOC REV: CPT | Performed by: INTERNAL MEDICINE

## 2019-07-24 PROCEDURE — G8427 DOCREV CUR MEDS BY ELIG CLIN: HCPCS | Performed by: INTERNAL MEDICINE

## 2019-07-24 ASSESSMENT — LIFESTYLE VARIABLES
AUDIT TOTAL SCORE: 2
HOW OFTEN DO YOU HAVE SIX OR MORE DRINKS ON ONE OCCASION: 0
HOW OFTEN DURING THE LAST YEAR HAVE YOU NEEDED AN ALCOHOLIC DRINK FIRST THING IN THE MORNING TO GET YOURSELF GOING AFTER A NIGHT OF HEAVY DRINKING: 0
HOW OFTEN DURING THE LAST YEAR HAVE YOU FOUND THAT YOU WERE NOT ABLE TO STOP DRINKING ONCE YOU HAD STARTED: 0
HAVE YOU OR SOMEONE ELSE BEEN INJURED AS A RESULT OF YOUR DRINKING: 0
HOW MANY STANDARD DRINKS CONTAINING ALCOHOL DO YOU HAVE ON A TYPICAL DAY: 0
HOW OFTEN DURING THE LAST YEAR HAVE YOU BEEN UNABLE TO REMEMBER WHAT HAPPENED THE NIGHT BEFORE BECAUSE YOU HAD BEEN DRINKING: 0
HOW OFTEN DO YOU HAVE A DRINK CONTAINING ALCOHOL: 2
HOW OFTEN DURING THE LAST YEAR HAVE YOU FAILED TO DO WHAT WAS NORMALLY EXPECTED FROM YOU BECAUSE OF DRINKING: 0
AUDIT-C TOTAL SCORE: 2
HOW OFTEN DURING THE LAST YEAR HAVE YOU HAD A FEELING OF GUILT OR REMORSE AFTER DRINKING: 0
HAS A RELATIVE, FRIEND, DOCTOR, OR ANOTHER HEALTH PROFESSIONAL EXPRESSED CONCERN ABOUT YOUR DRINKING OR SUGGESTED YOU CUT DOWN: 0

## 2019-07-24 ASSESSMENT — ENCOUNTER SYMPTOMS
GASTROINTESTINAL NEGATIVE: 1
EYES NEGATIVE: 1
RESPIRATORY NEGATIVE: 1
ALLERGIC/IMMUNOLOGIC NEGATIVE: 1

## 2019-07-24 ASSESSMENT — PATIENT HEALTH QUESTIONNAIRE - PHQ9
SUM OF ALL RESPONSES TO PHQ QUESTIONS 1-9: 0
SUM OF ALL RESPONSES TO PHQ QUESTIONS 1-9: 0

## 2019-07-24 NOTE — ASSESSMENT & PLAN NOTE
LDL still elevated will take med at [facility] and be more compliant Repeat labs in 6 months

## 2019-09-03 RX ORDER — MELOXICAM 7.5 MG/1
TABLET ORAL
Qty: 60 TABLET | Refills: 1 | Status: SHIPPED | OUTPATIENT
Start: 2019-09-03 | End: 2020-03-04

## 2019-10-15 RX ORDER — ATORVASTATIN CALCIUM 40 MG/1
TABLET, FILM COATED ORAL
Qty: 90 TABLET | Refills: 1 | Status: SHIPPED | OUTPATIENT
Start: 2019-10-15 | End: 2020-05-12 | Stop reason: SDUPTHER

## 2020-01-10 ENCOUNTER — TELEPHONE (OUTPATIENT)
Dept: INTERNAL MEDICINE CLINIC | Age: 75
End: 2020-01-10

## 2020-01-11 ENCOUNTER — OFFICE VISIT (OUTPATIENT)
Dept: INTERNAL MEDICINE CLINIC | Age: 75
End: 2020-01-11
Payer: MEDICARE

## 2020-01-11 VITALS
SYSTOLIC BLOOD PRESSURE: 122 MMHG | WEIGHT: 199 LBS | RESPIRATION RATE: 14 BRPM | HEIGHT: 74 IN | HEART RATE: 66 BPM | DIASTOLIC BLOOD PRESSURE: 68 MMHG | BODY MASS INDEX: 25.54 KG/M2

## 2020-01-11 PROBLEM — L03.011 PARONYCHIA OF FINGER, RIGHT: Status: ACTIVE | Noted: 2020-01-11

## 2020-01-11 PROCEDURE — G8484 FLU IMMUNIZE NO ADMIN: HCPCS | Performed by: INTERNAL MEDICINE

## 2020-01-11 PROCEDURE — G8428 CUR MEDS NOT DOCUMENT: HCPCS | Performed by: INTERNAL MEDICINE

## 2020-01-11 PROCEDURE — 3017F COLORECTAL CA SCREEN DOC REV: CPT | Performed by: INTERNAL MEDICINE

## 2020-01-11 PROCEDURE — G8417 CALC BMI ABV UP PARAM F/U: HCPCS | Performed by: INTERNAL MEDICINE

## 2020-01-11 PROCEDURE — 1036F TOBACCO NON-USER: CPT | Performed by: INTERNAL MEDICINE

## 2020-01-11 PROCEDURE — 99213 OFFICE O/P EST LOW 20 MIN: CPT | Performed by: INTERNAL MEDICINE

## 2020-01-11 PROCEDURE — 1123F ACP DISCUSS/DSCN MKR DOCD: CPT | Performed by: INTERNAL MEDICINE

## 2020-01-11 PROCEDURE — 4040F PNEUMOC VAC/ADMIN/RCVD: CPT | Performed by: INTERNAL MEDICINE

## 2020-01-11 RX ORDER — AMOXICILLIN AND CLAVULANATE POTASSIUM 875; 125 MG/1; MG/1
1 TABLET, FILM COATED ORAL 2 TIMES DAILY
Qty: 20 TABLET | Refills: 0 | Status: SHIPPED | OUTPATIENT
Start: 2020-01-11 | End: 2020-01-21

## 2020-01-11 ASSESSMENT — PATIENT HEALTH QUESTIONNAIRE - PHQ9
SUM OF ALL RESPONSES TO PHQ9 QUESTIONS 1 & 2: 0
SUM OF ALL RESPONSES TO PHQ QUESTIONS 1-9: 0
2. FEELING DOWN, DEPRESSED OR HOPELESS: 0
SUM OF ALL RESPONSES TO PHQ QUESTIONS 1-9: 0
1. LITTLE INTEREST OR PLEASURE IN DOING THINGS: 0

## 2020-01-11 ASSESSMENT — ENCOUNTER SYMPTOMS
GASTROINTESTINAL NEGATIVE: 1
RESPIRATORY NEGATIVE: 1

## 2020-01-11 NOTE — ASSESSMENT & PLAN NOTE
Warm soaks with soap and water , H2O2 and polysporin and Augmentin if no better in 7-10 day refer to Dr El Shabazz

## 2020-01-20 DIAGNOSIS — E78.5 HYPERLIPIDEMIA, UNSPECIFIED HYPERLIPIDEMIA TYPE: ICD-10-CM

## 2020-01-20 DIAGNOSIS — E55.9 VITAMIN D DEFICIENCY: ICD-10-CM

## 2020-01-20 DIAGNOSIS — R53.82 CHRONIC FATIGUE: ICD-10-CM

## 2020-01-20 DIAGNOSIS — R42 VERTIGO: ICD-10-CM

## 2020-01-20 DIAGNOSIS — N20.0 NEPHROLITHIASIS: ICD-10-CM

## 2020-01-20 LAB
A/G RATIO: 1.3 (ref 1.1–2.2)
ALBUMIN SERPL-MCNC: 3.9 G/DL (ref 3.4–5)
ALP BLD-CCNC: 62 U/L (ref 40–129)
ALT SERPL-CCNC: 23 U/L (ref 10–40)
ANION GAP SERPL CALCULATED.3IONS-SCNC: 13 MMOL/L (ref 3–16)
AST SERPL-CCNC: 20 U/L (ref 15–37)
BASOPHILS ABSOLUTE: 0.1 K/UL (ref 0–0.2)
BASOPHILS RELATIVE PERCENT: 0.9 %
BILIRUB SERPL-MCNC: 0.6 MG/DL (ref 0–1)
BUN BLDV-MCNC: 23 MG/DL (ref 7–20)
CALCIUM SERPL-MCNC: 8.9 MG/DL (ref 8.3–10.6)
CHLORIDE BLD-SCNC: 103 MMOL/L (ref 99–110)
CHOLESTEROL, TOTAL: 155 MG/DL (ref 0–199)
CO2: 25 MMOL/L (ref 21–32)
CREAT SERPL-MCNC: 1.1 MG/DL (ref 0.8–1.3)
EOSINOPHILS ABSOLUTE: 0.5 K/UL (ref 0–0.6)
EOSINOPHILS RELATIVE PERCENT: 9.1 %
GFR AFRICAN AMERICAN: >60
GFR NON-AFRICAN AMERICAN: >60
GLOBULIN: 2.9 G/DL
GLUCOSE BLD-MCNC: 104 MG/DL (ref 70–99)
HCT VFR BLD CALC: 43.8 % (ref 40.5–52.5)
HDLC SERPL-MCNC: 50 MG/DL (ref 40–60)
HEMOGLOBIN: 14.7 G/DL (ref 13.5–17.5)
LDL CHOLESTEROL CALCULATED: 90 MG/DL
LYMPHOCYTES ABSOLUTE: 1.5 K/UL (ref 1–5.1)
LYMPHOCYTES RELATIVE PERCENT: 25.8 %
MCH RBC QN AUTO: 30.3 PG (ref 26–34)
MCHC RBC AUTO-ENTMCNC: 33.6 G/DL (ref 31–36)
MCV RBC AUTO: 90.1 FL (ref 80–100)
MONOCYTES ABSOLUTE: 0.6 K/UL (ref 0–1.3)
MONOCYTES RELATIVE PERCENT: 10.5 %
NEUTROPHILS ABSOLUTE: 3.1 K/UL (ref 1.7–7.7)
NEUTROPHILS RELATIVE PERCENT: 53.7 %
PDW BLD-RTO: 13 % (ref 12.4–15.4)
PLATELET # BLD: 207 K/UL (ref 135–450)
PMV BLD AUTO: 9.1 FL (ref 5–10.5)
POTASSIUM SERPL-SCNC: 4.5 MMOL/L (ref 3.5–5.1)
RBC # BLD: 4.87 M/UL (ref 4.2–5.9)
SODIUM BLD-SCNC: 141 MMOL/L (ref 136–145)
TOTAL PROTEIN: 6.8 G/DL (ref 6.4–8.2)
TRIGL SERPL-MCNC: 77 MG/DL (ref 0–150)
TSH REFLEX: 3.29 UIU/ML (ref 0.27–4.2)
VLDLC SERPL CALC-MCNC: 15 MG/DL
WBC # BLD: 5.8 K/UL (ref 4–11)

## 2020-02-20 ENCOUNTER — TELEPHONE (OUTPATIENT)
Dept: INTERNAL MEDICINE CLINIC | Age: 75
End: 2020-02-20

## 2020-02-26 ENCOUNTER — TELEPHONE (OUTPATIENT)
Dept: INTERNAL MEDICINE CLINIC | Age: 75
End: 2020-02-26

## 2020-02-26 NOTE — TELEPHONE ENCOUNTER
Patient called wanting to know if you could call in a prescription for :    diclofenac sodium topical gel    Its for his arthritis pain. Patient is aware that Dr. Ashley Abdul in out of the office but asked if he could get this called in so he could possibly pick this up today?      Please call to advise     [facility] [place]arth [place], [address] [facility] [facility] [address] [place] [address]

## 2020-03-04 RX ORDER — MELOXICAM 7.5 MG/1
TABLET ORAL
Qty: 60 TABLET | Refills: 3 | Status: SHIPPED | OUTPATIENT
Start: 2020-03-04 | End: 2020-05-27 | Stop reason: SDUPTHER

## 2020-05-08 ENCOUNTER — VIRTUAL VISIT (OUTPATIENT)
Dept: INTERNAL MEDICINE CLINIC | Age: 75
End: 2020-05-08
Payer: MEDICARE

## 2020-05-08 VITALS
DIASTOLIC BLOOD PRESSURE: 70 MMHG | BODY MASS INDEX: 24.26 KG/M2 | SYSTOLIC BLOOD PRESSURE: 120 MMHG | HEART RATE: 66 BPM | HEIGHT: 74 IN | WEIGHT: 189 LBS | TEMPERATURE: 99 F

## 2020-05-08 PROBLEM — R20.2 TINGLING: Status: ACTIVE | Noted: 2020-05-08

## 2020-05-08 PROCEDURE — 4040F PNEUMOC VAC/ADMIN/RCVD: CPT | Performed by: INTERNAL MEDICINE

## 2020-05-08 PROCEDURE — 3017F COLORECTAL CA SCREEN DOC REV: CPT | Performed by: INTERNAL MEDICINE

## 2020-05-08 PROCEDURE — 1123F ACP DISCUSS/DSCN MKR DOCD: CPT | Performed by: INTERNAL MEDICINE

## 2020-05-08 PROCEDURE — 99214 OFFICE O/P EST MOD 30 MIN: CPT | Performed by: INTERNAL MEDICINE

## 2020-05-08 PROCEDURE — G8427 DOCREV CUR MEDS BY ELIG CLIN: HCPCS | Performed by: INTERNAL MEDICINE

## 2020-05-08 NOTE — PROGRESS NOTES
Subjective:      Patient ID: Padmini Dejesus is a 69 y.o. male. HPI  Here today for follow up of chronic problems as per HPI and as problems listed under assessment and plan,no new c/o feels good other than occ tingling sensation L neck and to top of shoulder lasting up to 90 seconds no pain no change in ROM of arm or neck occurs 3-4 times/day has happened for the last 2 weeks - no rash . Only change in activity is increase amount of yard work -  this is a VV 2nd to current COVID situation     Hyperlipidemia   This is a chronic problem. The current episode started more than 1 year ago. The problem is controlled. Recent lipid tests were reviewed and are normal. There are no known factors aggravating his hyperlipidemia. Pertinent negatives include no myalgias. Current antihyperlipidemic treatment includes diet change, exercise and statins. The current treatment provides significant improvement of lipids. There are no compliance problems. Risk factors for coronary artery disease include dyslipidemia and male sex. No Known Allergies    Current Outpatient Medications   Medication Sig Dispense Refill    meloxicam (MOBIC) 7.5 MG tablet TAKE 1 TABLET BY MOUTH ONE TIME A DAY  60 tablet 3    diclofenac sodium 1 % GEL Apply 2 g topically 4 times daily 1 Tube 0    atorvastatin (LIPITOR) 40 MG tablet TAKE ONE TABLET BY MOUTH DAILY 90 tablet 1    aspirin-acetaminophen-caffeine (EXCEDRIN MIGRAINE) 250-250-65 MG per tablet Take 1 tablet by mouth every 6 hours as needed for Headaches      ondansetron (ZOFRAN) 4 MG tablet Take 1 tablet by mouth every 8 hours as needed for Nausea. 6 tablet 2    Vitamin D (CHOLECALCIFEROL) 1000 UNITS CAPS capsule Take 1,000 Units by mouth daily.  aspirin 81 MG EC tablet Take 81 mg by mouth daily.  Saw [place] 450 MG CAPS Take 1 tablet by mouth 2 times daily.  Misc Natural Products (OSTEO BI-FLEX ADV TRIPLE ST) TABS Take 1 tablet by mouth 2 times daily.        

## 2020-05-12 ENCOUNTER — TELEPHONE (OUTPATIENT)
Dept: INTERNAL MEDICINE CLINIC | Age: 75
End: 2020-05-12

## 2020-05-12 RX ORDER — ATORVASTATIN CALCIUM 40 MG/1
TABLET, FILM COATED ORAL
Qty: 90 TABLET | Refills: 1 | OUTPATIENT
Start: 2020-05-12 | End: 2020-11-11 | Stop reason: SDUPTHER

## 2020-05-20 ENCOUNTER — TELEPHONE (OUTPATIENT)
Dept: INTERNAL MEDICINE CLINIC | Age: 75
End: 2020-05-20

## 2020-05-20 NOTE — TELEPHONE ENCOUNTER
Pt requesting to speak to Dr. Miladis Amos about the tingling in neck that has been going on since his vv on May 8th. Pt would like to know what else to do.

## 2020-05-27 ENCOUNTER — OFFICE VISIT (OUTPATIENT)
Dept: INTERNAL MEDICINE CLINIC | Age: 75
End: 2020-05-27
Payer: MEDICARE

## 2020-05-27 VITALS
BODY MASS INDEX: 24.46 KG/M2 | RESPIRATION RATE: 14 BRPM | HEART RATE: 68 BPM | WEIGHT: 190.6 LBS | SYSTOLIC BLOOD PRESSURE: 122 MMHG | HEIGHT: 74 IN | DIASTOLIC BLOOD PRESSURE: 80 MMHG | TEMPERATURE: 97.9 F

## 2020-05-27 PROBLEM — L03.011 PARONYCHIA OF FINGER, RIGHT: Status: RESOLVED | Noted: 2020-01-11 | Resolved: 2020-05-27

## 2020-05-27 PROCEDURE — 3017F COLORECTAL CA SCREEN DOC REV: CPT | Performed by: INTERNAL MEDICINE

## 2020-05-27 PROCEDURE — 1123F ACP DISCUSS/DSCN MKR DOCD: CPT | Performed by: INTERNAL MEDICINE

## 2020-05-27 PROCEDURE — G8427 DOCREV CUR MEDS BY ELIG CLIN: HCPCS | Performed by: INTERNAL MEDICINE

## 2020-05-27 PROCEDURE — 99214 OFFICE O/P EST MOD 30 MIN: CPT | Performed by: INTERNAL MEDICINE

## 2020-05-27 PROCEDURE — 1036F TOBACCO NON-USER: CPT | Performed by: INTERNAL MEDICINE

## 2020-05-27 PROCEDURE — 4040F PNEUMOC VAC/ADMIN/RCVD: CPT | Performed by: INTERNAL MEDICINE

## 2020-05-27 PROCEDURE — G8420 CALC BMI NORM PARAMETERS: HCPCS | Performed by: INTERNAL MEDICINE

## 2020-05-27 RX ORDER — MELOXICAM 7.5 MG/1
TABLET ORAL
Qty: 90 TABLET | Refills: 1 | Status: SHIPPED | OUTPATIENT
Start: 2020-05-27 | End: 2021-01-19

## 2020-05-27 ASSESSMENT — ENCOUNTER SYMPTOMS
RESPIRATORY NEGATIVE: 1
ALLERGIC/IMMUNOLOGIC NEGATIVE: 1
EYES NEGATIVE: 1
GASTROINTESTINAL NEGATIVE: 1

## 2020-07-09 ENCOUNTER — HOSPITAL ENCOUNTER (OUTPATIENT)
Dept: NEUROLOGY | Age: 75
Discharge: HOME | End: 2020-07-09
Payer: MEDICARE

## 2020-07-09 PROCEDURE — 95909 NRV CNDJ TST 5-6 STUDIES: CPT | Performed by: PSYCHIATRY & NEUROLOGY

## 2020-07-09 PROCEDURE — 95909 NRV CNDJ TST 5-6 STUDIES: CPT

## 2020-07-09 PROCEDURE — 95886 MUSC TEST DONE W/N TEST COMP: CPT | Performed by: PSYCHIATRY & NEUROLOGY

## 2020-07-09 PROCEDURE — 95886 MUSC TEST DONE W/N TEST COMP: CPT

## 2020-07-09 NOTE — PROCEDURES
[facility] [address]                     [address] [facility], [address]                             ELECTROMYOGRAM REPORT    PATIENT NAME: Maksim Mcmullen                :        1945  MED REC NO:   [id number]                          ROOM:  ACCOUNT NO:   [de-identified]                           ADMIT DATE: 2020  PROVIDER:     Roberto Carlos Bennett MD    DATE OF EM2020    REASON FOR EMG:  Left arm pain and numbness. SUMMARY:  The left median motor nerve study was normal.  The left median  sensory nerve study had a slightly prolonged distal latency. The left  ulnar motor nerve study had a moderately severe slowing of conduction  velocity across the elbow. The left ulnar and radial sensory nerve  studies were normal.  Needle EMG of several muscles in the left upper  extremity showed decreased motor units in the left first dorsal  interosseous muscle. EMG DIAGNOSES:  1.  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 2.  Mild left median nerve lesion at the wrist (carpal tunnel syndrome).         Ildefonso Leon MD    D: 2020 13:12:48       T: 2020 13:20:43     [email or web address]  Job#: [id number]     Doc#: [id number]    CC:

## 2020-07-27 ENCOUNTER — OFFICE VISIT (OUTPATIENT)
Dept: INTERNAL MEDICINE CLINIC | Age: 75
End: 2020-07-27
Payer: MEDICARE

## 2020-07-27 ENCOUNTER — HOSPITAL ENCOUNTER (OUTPATIENT)
Age: 75
Discharge: HOME | End: 2020-07-27
Payer: MEDICARE

## 2020-07-27 ENCOUNTER — HOSPITAL ENCOUNTER (OUTPATIENT)
Dept: GENERAL RADIOLOGY | Age: 75
Discharge: HOME | End: 2020-07-27
Payer: MEDICARE

## 2020-07-27 VITALS
DIASTOLIC BLOOD PRESSURE: 70 MMHG | WEIGHT: 193.2 LBS | HEART RATE: 66 BPM | TEMPERATURE: 97.5 F | SYSTOLIC BLOOD PRESSURE: 120 MMHG | HEIGHT: 74 IN | RESPIRATION RATE: 14 BRPM | BODY MASS INDEX: 24.79 KG/M2

## 2020-07-27 PROBLEM — M25.562 CHRONIC PAIN OF BOTH KNEES: Status: ACTIVE | Noted: 2020-07-27

## 2020-07-27 PROBLEM — G89.29 CHRONIC PAIN OF BOTH KNEES: Status: ACTIVE | Noted: 2020-07-27

## 2020-07-27 PROBLEM — G56.22 ULNAR NERVE ENTRAPMENT AT ELBOW, LEFT: Status: ACTIVE | Noted: 2020-07-27

## 2020-07-27 PROBLEM — M25.561 CHRONIC PAIN OF BOTH KNEES: Status: ACTIVE | Noted: 2020-07-27

## 2020-07-27 PROCEDURE — G8427 DOCREV CUR MEDS BY ELIG CLIN: HCPCS | Performed by: INTERNAL MEDICINE

## 2020-07-27 PROCEDURE — G8420 CALC BMI NORM PARAMETERS: HCPCS | Performed by: INTERNAL MEDICINE

## 2020-07-27 PROCEDURE — 4040F PNEUMOC VAC/ADMIN/RCVD: CPT | Performed by: INTERNAL MEDICINE

## 2020-07-27 PROCEDURE — G0439 PPPS, SUBSEQ VISIT: HCPCS | Performed by: INTERNAL MEDICINE

## 2020-07-27 PROCEDURE — 3017F COLORECTAL CA SCREEN DOC REV: CPT | Performed by: INTERNAL MEDICINE

## 2020-07-27 PROCEDURE — 1036F TOBACCO NON-USER: CPT | Performed by: INTERNAL MEDICINE

## 2020-07-27 PROCEDURE — 73562 X-RAY EXAM OF KNEE 3: CPT

## 2020-07-27 PROCEDURE — 1123F ACP DISCUSS/DSCN MKR DOCD: CPT | Performed by: INTERNAL MEDICINE

## 2020-07-27 PROCEDURE — 99214 OFFICE O/P EST MOD 30 MIN: CPT | Performed by: INTERNAL MEDICINE

## 2020-07-27 ASSESSMENT — ENCOUNTER SYMPTOMS
GASTROINTESTINAL NEGATIVE: 1
ALLERGIC/IMMUNOLOGIC NEGATIVE: 1
RESPIRATORY NEGATIVE: 1
EYES NEGATIVE: 1

## 2020-07-27 ASSESSMENT — PATIENT HEALTH QUESTIONNAIRE - PHQ9
SUM OF ALL RESPONSES TO PHQ QUESTIONS 1-9: 0
SUM OF ALL RESPONSES TO PHQ QUESTIONS 1-9: 0

## 2020-07-27 ASSESSMENT — LIFESTYLE VARIABLES
HOW OFTEN DO YOU HAVE A DRINK CONTAINING ALCOHOL: 2
HOW MANY STANDARD DRINKS CONTAINING ALCOHOL DO YOU HAVE ON A TYPICAL DAY: 0
AUDIT-C TOTAL SCORE: 2
HOW OFTEN DO YOU HAVE SIX OR MORE DRINKS ON ONE OCCASION: 0

## 2020-07-27 NOTE — LETTER
[facility] [address]  [address] [address]  Phone: [phone]  Fax: [phone]    Polo Maya MD        July 27, 2020     Patient: Diego Joiner   YOB: 1945   Date of Visit: 7/27/2020       To Whom It May Concern: It is my medical opinion that Hollis Rangel requires a disability parking placard for the following reasons: Unable to walk 200 feet without assistance/assistive device due too: Chronic pain of both feet    Duration of need: 5 years    If you have any questions or concerns, please don't hesitate to call.     Sincerely,        Polo Maya MD   State License#[id number]

## 2020-07-27 NOTE — PROGRESS NOTES
Subjective:      Patient ID: Peggy Quezada is a 69 y.o. male. HPI  Here today for medicare complete physical and review of chronic problems as listed under assessment and plan,no new c/o feels good     Hyperlipidemia   This is a chronic problem. The current episode started more than 1 year ago. The problem is controlled. Recent lipid tests were reviewed and are normal. There are no known factors aggravating his hyperlipidemia. Pertinent negatives include no myalgias. Current antihyperlipidemic treatment includes diet change, exercise and statins. The current treatment provides significant improvement of lipids. There are no compliance problems. Risk factors for coronary artery disease include dyslipidemia and male sex. No Known Allergies    Current Outpatient Medications   Medication Sig Dispense Refill    meloxicam (MOBIC) 7.5 MG tablet TAKE 1 TABLET BY MOUTH ONE TIME A DAY 90 tablet 1    atorvastatin (LIPITOR) 40 MG tablet TAKE ONE TABLET BY MOUTH DAILY 90 tablet 1    diclofenac sodium 1 % GEL Apply 2 g topically 4 times daily 1 Tube 0    aspirin-acetaminophen-caffeine (EXCEDRIN MIGRAINE) 250-250-65 MG per tablet Take 1 tablet by mouth every 6 hours as needed for Headaches      ondansetron (ZOFRAN) 4 MG tablet Take 1 tablet by mouth every 8 hours as needed for Nausea. 6 tablet 2    Vitamin D (CHOLECALCIFEROL) 1000 UNITS CAPS capsule Take 1,000 Units by mouth daily.  Saw [place] 450 MG CAPS Take 1 tablet by mouth 2 times daily.  Misc Natural Products (OSTEO BI-FLEX ADV TRIPLE ST) TABS Take 1 tablet by mouth 2 times daily.  Coenzyme Q-10 100 MG CAPS Take 1 tablet by mouth daily.  Multiple Vitamin (MULTI VITAMIN MENS PO) Take by mouth daily        No current facility-administered medications for this visit.         Past Medical History:   Diagnosis Date    Cephalgia     Hyperlipidemia     Left knee pain        Family History   Problem Relation Age of Onset    Cancer Mother     Cancer Father         LIVER       Past Surgical History:   Procedure Laterality Date    COLONOSCOPY  3/2013    normal Dr Brandi Guy repeat 2023    KNEE CARTILAGE SURGERY  1980\"S    LEFT KNEE    PATELLA SURGERY  1980'S    LEFT    TONSILLECTOMY AND ADENOIDECTOMY  1952       Social History     Socioeconomic History    Marital status:      Spouse name: Not on file    Number of children: Not on file    Years of education: Not on file    Highest education level: Not on file   Occupational History    Not on file   Social Needs    Financial resource strain: Not on file    Food insecurity     Worry: Not on file     Inability: Not on file    Transportation needs     Medical: Not on file     Non-medical: Not on file   Tobacco Use    Smoking status: Never Smoker    Smokeless tobacco: Never Used   Substance and Sexual Activity    Alcohol use: Yes     Comment: OCCASIONALLY    Drug use: No    Sexual activity: Not on file   Lifestyle    Physical activity     Days per week: Not on file     Minutes per session: Not on file    Stress: Not on file   Relationships    Social connections     Talks on phone: Not on file     Gets together: Not on file     Attends Gnosticist service: Not on file     Active member of club or organization: Not on file     Attends meetings of clubs or organizations: Not on file     Relationship status: Not on file    Intimate partner violence     Fear of current or ex partner: Not on file     Emotionally abused: Not on file     Physically abused: Not on file     Forced sexual activity: Not on file   Other Topics Concern    Not on file   Social History Narrative    Not on file       Review of Systems  Review of Systems   Constitutional: Positive for unexpected weight change (down a few # ). HENT: Negative. Eyes: Negative. Glasses  Early cataracts    Respiratory: Negative. Cardiovascular: Negative. Gastrointestinal: Negative.           Colonoscopy was ok repeat 2023   Endocrine: Negative. Genitourinary: Positive for frequency (occ nocturia ). Episode of renal colic as noted prn meds    Musculoskeletal: Positive for arthralgias ( L knee pain is getting worse ) and gait problem (L knee  pain on and off c/o ). Negative for myalgias. Skin: Negative. Allergic/Immunologic: Negative. Neurological: Positive for dizziness (occ sx  occ balance . sx ). Hematological: Negative. Psychiatric/Behavioral: Negative. Objective:   Physical Exam:  Physical Exam  Constitutional:       Appearance: He is well-developed. HENT:      Head: Normocephalic and atraumatic. Right Ear: Tympanic membrane, ear canal and external ear normal.      Left Ear: Tympanic membrane, ear canal and external ear normal.   Eyes:      Extraocular Movements: Extraocular movements intact. Conjunctiva/sclera: Conjunctivae normal.      Pupils: Pupils are equal, round, and reactive to light. Neck:      Musculoskeletal: Normal range of motion and neck supple. Thyroid: No thyromegaly. Trachea: No tracheal deviation. Cardiovascular:      Rate and Rhythm: Normal rate and regular rhythm. Pulses: Normal pulses. Heart sounds: Normal heart sounds. Pulmonary:      Effort: Pulmonary effort is normal.      Breath sounds: Normal breath sounds. Abdominal:      General: Abdomen is flat. Bowel sounds are normal.      Palpations: Abdomen is soft. Genitourinary:     Penis: Normal. No tenderness. Prostate: Normal.      Rectum: Normal. Guaiac result negative. Musculoskeletal: Normal range of motion. Skin:     General: Skin is warm and dry. Neurological:      General: No focal deficit present. Mental Status: He is alert and oriented to person, place, and time. Deep Tendon Reflexes: Reflexes are normal and symmetric. Psychiatric:         Mood and Affect: Mood normal.         Behavior: Behavior normal.         Thought Content:  Thought content normal.         /70 (Site: Right Upper Arm, Position: Sitting, Cuff Size: Medium Adult)   Pulse 66   Temp 97.5 °F (36.4 °C) (Oral)   Resp 14   Ht 6' 2\" (1.88 m)   Wt 193 lb 3.2 oz (87.6 kg)   BMI 24.81 kg/m²       Assessment & Plan:         Hyperlipidemia  Stable continue current meds and return in 6 mo. Vitamin D deficiency  Continue current meds     Nephrolithiasis  No recent episodes     Ulnar nerve entrapment at elbow, left  + EMG for ulnar nerve entrapment  also some carpal tunnel, on  L side refer to Dr Margarita Stephens  + EMG for ulnar nerve entrapment  also some carpal tunnel, on  L side refer to Dr Clarissa Ramachandran     Chronic fatigue  Labs ok     Vertigo  Stable for now     Well adult exam  Within normal limits for age- cont to work no ADL issues,immunizations up to date, no depression ,no cognitive impairment  Colonoscopy up to date  Eye exam up to date  Exercises as tolerated    No living will but does not want resuscitation   Findings and recommendations discussed with Pt     Chronic pain of both knees  Send for xrays cont Meloxicam      Michael Staff  1945    No Known Allergies  Current Outpatient Medications   Medication Sig Dispense Refill    meloxicam (MOBIC) 7.5 MG tablet TAKE 1 TABLET BY MOUTH ONE TIME A DAY 90 tablet 1    atorvastatin (LIPITOR) 40 MG tablet TAKE ONE TABLET BY MOUTH DAILY 90 tablet 1    diclofenac sodium 1 % GEL Apply 2 g topically 4 times daily 1 Tube 0    aspirin-acetaminophen-caffeine (EXCEDRIN MIGRAINE) 250-250-65 MG per tablet Take 1 tablet by mouth every 6 hours as needed for Headaches      ondansetron (ZOFRAN) 4 MG tablet Take 1 tablet by mouth every 8 hours as needed for Nausea. 6 tablet 2    Vitamin D (CHOLECALCIFEROL) 1000 UNITS CAPS capsule Take 1,000 Units by mouth daily.  Saw [place] 450 MG CAPS Take 1 tablet by mouth 2 times daily.         Misc Natural Products (OSTEO BI-FLEX ADV TRIPLE ST) TABS Take 1 tablet by mouth 2 times daily.  Coenzyme Q-10 100 MG CAPS Take 1 tablet by mouth daily.  Multiple Vitamin (MULTI VITAMIN MENS PO) Take by mouth daily        No current facility-administered medications for this visit. Vitals:    07/27/20 0935   BP: 120/70   Site: Right Upper Arm   Position: Sitting   Cuff Size: Medium Adult   Pulse: 66   Resp: 14   Temp: 97.5 °F (36.4 °C)   TempSrc: Oral   Weight: 193 lb 3.2 oz (87.6 kg)   Height: 6' 2\" (1.88 m)     Body mass index is 24.81 kg/m². Wt Readings from Last 3 Encounters:   07/27/20 193 lb 3.2 oz (87.6 kg)   05/27/20 190 lb 9.6 oz (86.5 kg)   05/08/20 189 lb (85.7 kg)     BP Readings from Last 3 Encounters:   07/27/20 120/70   05/27/20 122/80   05/08/20 120/70       Consultants:   Patient Care Team:  Autumn Sam MD as PCP - General (Internal Medicine)  Autumn Sam MD as PCP - [facility] Empaneled Provider  Radha Cortez DPM as Consulting Physician (Podiatry)  Kush Liu MD as Consulting Physician (Otolaryngology)    Chief Complaint:   Jacob Aguilera is a 69 y.o. male who presents for Medicare Preventive Physical Examination with Personalized Prevention Plan Services. HPI: Tr review listed chronic problems     Patient Active Problem List   Diagnosis    Hyperlipidemia    Well adult exam    Vitamin D deficiency    Nephrolithiasis    Chronic fatigue    Vertigo    Tingling    Ulnar nerve entrapment at elbow, left    Chronic pain of both knees       Mood Disorders Screen:  Risk factors: none  Symptoms:  endorses none, denies depressed mood, difficulty concentrating, difficulty sleeping, reduced interest in activities and changes in appetite     Safety Assessment:  Functional ability/ADLs:  Difficulty with bathing- no, grooming- no, meals- no, incontinence- no.  Driving- yes. Home safety: Lives with family wife. Number of stairs to enter home: 1, within home: 12 between floors. Risk factors for falls: osteoarthritis (DJD).   Home environment modifications:  yes - rails in BR. End of Life Planning:  Advanced Directive: Power of  for healthcare on file.       Preventive Care:  Self-testicular exams: Yes  Last PSA: 2.48, normal  Last colonoscopy: 2013, normal  AAA screening:  no   Last eye exam: 2019, glasses early catarcts  Exercise: yes  Seatbelt use: yes      Immunization History   Administered Date(s) Administered    Pneumococcal Conjugate 13-valent ([id number]) 05/11/2015    Pneumococcal Polysaccharide ([id number]) 03/22/2013    Tdap (Boostrix, Adacel) 07/08/2011       Past Medical History:   Diagnosis Date    Cephalgia     Hyperlipidemia     Left knee pain      Past Surgical History:   Procedure Laterality Date    COLONOSCOPY  3/2013    normal Dr Armenta Pages repeat 2023    KNEE CARTILAGE SURGERY  1980\"S    LEFT KNEE    PATELLA SURGERY  1980'S    LEFT    TONSILLECTOMY AND ADENOIDECTOMY  1952     Family History   Problem Relation Age of Onset    Cancer Mother     Cancer Father         LIVER     Social History     Socioeconomic History    Marital status:      Spouse name: Not on file    Number of children: Not on file    Years of education: Not on file    Highest education level: Not on file   Occupational History    Not on file   Social Needs    Financial resource strain: Not on file    Food insecurity     Worry: Not on file     Inability: Not on file    Transportation needs     Medical: Not on file     Non-medical: Not on file   Tobacco Use    Smoking status: Never Smoker    Smokeless tobacco: Never Used   Substance and Sexual Activity    Alcohol use: Yes     Comment: OCCASIONALLY    Drug use: No    Sexual activity: Not on file   Lifestyle    Physical activity     Days per week: Not on file     Minutes per session: Not on file    Stress: Not on file   Relationships    Social connections     Talks on phone: Not on file     Gets together: Not on file     Attends Hindu service: Not on file Active member of club or organization: Not on file     Attends meetings of clubs or organizations: Not on file     Relationship status: Not on file    Intimate partner violence     Fear of current or ex partner: Not on file     Emotionally abused: Not on file     Physically abused: Not on file     Forced sexual activity: Not on file   Other Topics Concern    Not on file   Social History Narrative    Not on file     }        Visual Acuity: Corrected:            L  20/20            R 20/20    Cognitive Screening:  Clock drawing test score: 5/5. Mini-mental status exam score: NA. Assessment/Plan:  Delfina Ji was seen today for medicare awv and discuss labs. Diagnoses and all orders for this visit:    Well adult exam    Mixed hyperlipidemia    Vitamin D deficiency    Ulnar nerve entrapment at elbow, left  -     Ekaterina Ospina Ala, MD, Hand Surgery (Hand, Wrist, Upper Extremity), [place]    Tingling    Chronic fatigue    Vertigo    Chronic pain of both knees  -     XR KNEE RIGHT (3 VIEWS)  -     XR KNEE LEFT (3 VIEWS)      Medicare Annual Wellness Visit  Name: Gia Toledo Date: 2020   MRN: [id number] Sex: Male   Age: 69 y.o. Ethnicity: Non-/Non    : 1945 Race: Mariposa Tao is here for Medicare AWV and Discuss Labs    Screenings for behavioral, psychosocial and functional/safety risks, and cognitive dysfunction are all negative except as indicated below. These results, as well as other patient data from the [address] form, are documented in Flowsheets linked to this Encounter. No Known Allergies    Prior to Visit Medications    Medication Sig Taking?  Authorizing Provider   meloxicam (MOBIC) 7.5 MG tablet TAKE 1 TABLET BY MOUTH ONE TIME A DAY Yes Polo Maya MD   atorvastatin (LIPITOR) 40 MG tablet TAKE ONE TABLET BY MOUTH DAILY Yes Polo Maya MD   diclofenac sodium 1 % GEL Apply 2 g topically 4 times daily Yes Francisco J Tosha Eastman MD   aspirin-acetaminophen-caffeine (Authur Hensydnie) 064-741-80 MG per tablet Take 1 tablet by mouth every 6 hours as needed for Headaches Yes Historical Provider, MD   ondansetron (ZOFRAN) 4 MG tablet Take 1 tablet by mouth every 8 hours as needed for Nausea. Yes Linwood Sen MD   Vitamin D (CHOLECALCIFEROL) 1000 UNITS CAPS capsule Take 1,000 Units by mouth daily. Yes Historical Provider, MD   Saw [place] 450 MG CAPS Take 1 tablet by mouth 2 times daily. Yes Historical Provider, MD   Misc Natural Products (OSTEO BI-FLEX ADV TRIPLE ST) TABS Take 1 tablet by mouth 2 times daily. Yes Historical Provider, MD   Coenzyme Q-10 100 MG CAPS Take 1 tablet by mouth daily.    Yes Historical Provider, MD   Multiple Vitamin (MULTI VITAMIN MENS PO) Take by mouth daily  Yes Historical Provider, MD       Past Medical History:   Diagnosis Date    Cephalgia     Hyperlipidemia     Left knee pain        Past Surgical History:   Procedure Laterality Date    COLONOSCOPY  3/2013    normal Dr Tiara John repeat 2023    KNEE CARTILAGE SURGERY  1980\"S    LEFT KNEE    PATELLA SURGERY  1980'S    LEFT    TONSILLECTOMY AND ADENOIDECTOMY  1952       Family History   Problem Relation Age of Onset    Cancer Mother     Cancer Father         LIVER       CareTeam (Including outside providers/suppliers regularly involved in providing care):   Patient Care Team:  Arpit Plata MD as PCP - General (Internal Medicine)  Arpit Plata MD as PCP - [facility] Empaneled Provider  Minerva Saldaña DPM as Consulting Physician (Podiatry)  Cherrie Centeno MD as Consulting Physician (Otolaryngology)    Wt Readings from Last 3 Encounters:   07/27/20 193 lb 3.2 oz (87.6 kg)   05/27/20 190 lb 9.6 oz (86.5 kg)   05/08/20 189 lb (85.7 kg)     Vitals:    07/27/20 0935   BP: 120/70   Site: Right Upper Arm   Position: Sitting   Cuff Size: Medium Adult   Pulse: 66   Resp: 14   Temp: 97.5 °F (36.4 °C)   TempSrc: Oral   Weight: 193 lb 3.2 oz (87.6 kg)  Hepatitis A vaccine  Aged Out    Hepatitis B vaccine  Aged Out    Hib vaccine  Aged Out    Meningococcal (ACWY) vaccine  Aged Out     Recommendations for Cignis Due: see orders and patient instructions/AVS.  . Recommended screening schedule for the next 5-10 years is provided to the patient in written form: see Patient Instructions/AVS.    Jeramy Carballo was seen today for medicare aw and discuss labs.     Diagnoses and all orders for this visit:    Well adult exam    Mixed hyperlipidemia    Vitamin D deficiency    Ulnar nerve entrapment at elbow, left  -     Ekaterina Dixon MD, Hand Surgery (Hand, Wrist, Upper Extremity), [place]    Tingling    Chronic fatigue    Vertigo    Chronic pain of both knees  -     XR KNEE RIGHT (3 VIEWS)  -     XR KNEE LEFT (3 VIEWS)

## 2020-07-27 NOTE — PATIENT INSTRUCTIONS
Personalized Preventive Plan for [place] - 7/27/2020  Medicare offers a range of preventive health benefits. Some of the tests and screenings are paid in full while other may be subject to a deductible, co-insurance, and/or copay. Some of these benefits include a comprehensive review of your medical history including lifestyle, illnesses that may run in your family, and various assessments and screenings as appropriate. After reviewing your medical record and screening and assessments performed today your provider may have ordered immunizations, labs, imaging, and/or referrals for you. A list of these orders (if applicable) as well as your Preventive Care list are included within your After Visit Summary for your review. Other Preventive Recommendations:    · A preventive eye exam performed by an eye specialist is recommended every 1-2 years to screen for glaucoma; cataracts, macular degeneration, and other eye disorders. · A preventive dental visit is recommended every 6 months. · Try to get at least 150 minutes of exercise per week or 10,000 steps per day on a pedometer . · Order or download the FREE \"Exercise & Physical Activity: Your Everyday Guide\" from [employer]. Call 2-100.592.8397 or search [employer] online. · You need 9027-8051 mg of calcium and 9395-8940 IU of vitamin D per day. It is possible to meet your calcium requirement with diet alone, but a vitamin D supplement is usually necessary to meet this goal.  · When exposed to the sun, use a sunscreen that protects against both UVA and UVB radiation with an SPF of 30 or greater. Reapply every 2 to 3 hours or after sweating, drying off with a towel, or swimming. · Always wear a seat belt when traveling in a car. Always wear a helmet when riding a bicycle or motorcycle.

## 2020-07-29 ENCOUNTER — OFFICE VISIT (OUTPATIENT)
Dept: ORTHOPEDIC SURGERY | Age: 75
End: 2020-07-29
Payer: MEDICARE

## 2020-07-29 ENCOUNTER — TELEPHONE (OUTPATIENT)
Dept: INTERNAL MEDICINE CLINIC | Age: 75
End: 2020-07-29

## 2020-07-29 VITALS — WEIGHT: 193 LBS | RESPIRATION RATE: 16 BRPM | BODY MASS INDEX: 24.77 KG/M2 | HEIGHT: 74 IN | TEMPERATURE: 97.3 F

## 2020-07-29 PROCEDURE — G8420 CALC BMI NORM PARAMETERS: HCPCS | Performed by: PHYSICIAN ASSISTANT

## 2020-07-29 PROCEDURE — 99203 OFFICE O/P NEW LOW 30 MIN: CPT | Performed by: PHYSICIAN ASSISTANT

## 2020-07-29 PROCEDURE — 3017F COLORECTAL CA SCREEN DOC REV: CPT | Performed by: PHYSICIAN ASSISTANT

## 2020-07-29 PROCEDURE — 1036F TOBACCO NON-USER: CPT | Performed by: PHYSICIAN ASSISTANT

## 2020-07-29 PROCEDURE — 4040F PNEUMOC VAC/ADMIN/RCVD: CPT | Performed by: PHYSICIAN ASSISTANT

## 2020-07-29 PROCEDURE — 1123F ACP DISCUSS/DSCN MKR DOCD: CPT | Performed by: PHYSICIAN ASSISTANT

## 2020-07-29 PROCEDURE — G8427 DOCREV CUR MEDS BY ELIG CLIN: HCPCS | Performed by: PHYSICIAN ASSISTANT

## 2020-07-29 NOTE — PATIENT INSTRUCTIONS
Thank you for choosing [facility] Physicians for your Hand and Upper Extremity needs. If we can be of any further assistance to you, please do not hesitate to contact us.     Office Phone Number:  [phone]  or  [phone]

## 2020-07-29 NOTE — Clinical Note
Dear  Dimitry Szymanski MD,    Thank you very much for your referral or Mr. Jo Aguillon to me for evaluation and treatment of his Hand & Wrist condition. I appreciate your confidence in me and thank you for allowing me the opportunity to care for your patients. If I can be of any further assistance to you on this or any other patient, please do not hesitate to contact me. Sincerely,    Adalid Jacobs.  Eduin Norwood MD

## 2020-07-29 NOTE — PROGRESS NOTES
Mr. Parker Souza is a 69 y.o. right handed individual  who is seen today in Hand Surgical Consultation at the request of Teofilo George MD.    He presents today regarding Left symptoms which have been present for approximately 3 months. A history of antecedent trauma or injury is Absent. He reports symptoms to include moderate numbness & tingling in the shoulder, neck and chin on the left side. He denies any neurologic symptoms in the elbow or hand. Neurologic symptoms do not Frequently awaken him from sleep. He reports no pain located in the Medial left elbow, without retrograde radiation . Symptoms are worsening over time. Previous treatment has included no prior treatments used. He does not claim relation of his symptoms to his required work activities. He has undergone electrodiagnostic testing. The patient's , past medical history, medications, allergies,  family history, social history, and review of systems have been updated, reviewed and have been scanned into the chart today. Physical Exam:  Mr. Parker Souza most recent vitals:  Vitals  Temp: 97.3 °F (36.3 °C)  Temp Source: Temporal  Resp: 16  Height: 6' 2\" (188 cm)  Weight: 193 lb (87.5 kg)    He is well nourished, oriented to person, place & time. He demonstrates appropriate mood and affect as well as normal gait and station. Skin: Normal in appearance, Normal Color and Free of Lesions Bilaterally   Digital range of motion is Full and equal bilaterally bilaterally  Wrist range of motion is Full and equal bilaterally bilaterally  Elbow range of motion is Full and equal bilaterally bilaterally  Sensation is subjectively normal in the Whole Hand and objectively present in the same distribution bilaterally.   All other digits show normal sensation  Vascular examination reveals normal, good capillary refill and good color bilaterally  Swelling is minimal about the elbow on the Left, normal on the Right  There is no evidence of gross joint instability bilaterally. Muscular strength is clinically appropriate bilaterally. Examination for Cubital Tunnel Syndrome on the left shows no tenderness to palpation at the Medial epicondyle. The Ulnar Nerve rests behind the medial epicondyle without subluxation upon elbow flexion. Elbow flexion-compression test is Negative, and there is not an active Tinnel's Sign over the Cubital Tunnel . The Ulnar Nerve innervated intrinsic musculature is not atrophied & weakened. Examination for Carpal Tunnel Syndrome shows Carpal Tunnel Compression Test to be negative bilaterally. Phalen's Maneuver is negative bilaterally. The thenar musculature shows no evidence of atrophy or weakness. Cervical Spine: Active Range of Motion  is Decreased with pain at extremes. Lateral bending does reproduce symptoms in the symptomatic extremity, Maximal rotation does reproduce symptoms in the symptomatic extremity. Review of Electrodiagnostic Testing:  Test performed on: 07/09/2020    NERVE CONDUCTION STUDY:      LEFT  Median Nerve: Sensory Latency: 3.5  Motor Latency: 3.8  Ulnar Nerve:  Conduction Velocity:  28.6    EMG:      LEFT  Abnormal  1st dorsal interosseus          Impression:  Mr. Joshua Connelly is showing clinical evidence of cervical etiology at this time and presents requesting further treatment. Plan:    As I do not find an etiology for his symptoms in my evaluation of his hand & wrist, I will refer Mr. Joshua Connelly for evaluation of his cervical spine to see if there is an ongoing problem at that level which may explain his presenting complaints. We had a long discussion regarding the fact that based on his EMG he does have a moderately severe cubital tunnel syndrome, however the symptoms that he presents with today appear more related to a cervical spine etiology.  I discussed the options with him in regards to his cubital tunnel syndrome and he wishes to proceed with acupuncture for his neck/shoulder first before doing anything at his elbow. I have had a thorough discussion with Mr. Linette Ramachandran regarding the treatment options available for his initially presenting left  cubital tunnel syndrome, which is causing him significant symptoms and difficulty. I have outlined for Mr. Linette Ramachandran the risk, benefits and consequences of the various treatment modalities, including a reasonable expectation for the long term success of each. We have discussed the likelihood that further, more aggressive treatment may be required for his current presenting condition. Based upon our current discussion and a reasonable understating of the options available to him, Mr. Linette Ramachandran has selected to proceed with a conservative plan of treatment consisting of: attention to elbow positioning and pressure,  activity modification, and the judicious use of over-the-counter anti-inflammatory medications if allowed by his primary care physician. Instructions were given regarding the use of other modalities as appropriate. I have clearly explained to him that the above outlined treatment plan should not be expected to 'cure' his  cubital tunnel syndrome, but we are rather treating the symptoms with which he presents. He has understood that in order to achieve more durable relief of his symptoms and to prevent future worsening or further damage, that definitive surgical treatment would be required. Mr. Linette Ramachandran  voiced an appropriate understanding of our discussion, the options available to him, and of the expectations of his selected  treatment. I have also discussed with Mr. Linette Ramachandran  the other treatment options available to him  for this condition. We have today selected to proceed with conservative management.   He and I have agreed that if our current course of conservative treatment does not prove to be effective over the short term future, that he will schedule a follow-up appointment to discuss and select an alternate course of therapy including possibly injection or surgical treatment. I have explained to Mr. Cj Elise that despite successful treatment (surgical decompression or otherwise) of his nerve entrapment, that due to his severe nerve damage, that he is likely to have some permanent residual symptoms that do not improve long term. I have also explained that maximal recovery of nerve function may take a full year or longer to realize. He voiced a clear understanding of this. Mr. Cj Elise has been given a full verbal list of instructions and precautions related to his present condition. I have asked him to followup with me in the office at the prescribed time. He is also specifically requested to call or return to the office sooner if his symptoms change or worsen prior to the next scheduled appointment.

## 2020-08-13 ENCOUNTER — TELEPHONE (OUTPATIENT)
Dept: ORTHOPEDIC SURGERY | Age: 75
End: 2020-08-13

## 2020-08-13 ENCOUNTER — OFFICE VISIT (OUTPATIENT)
Dept: PRIMARY CARE CLINIC | Age: 75
End: 2020-08-13
Payer: MEDICARE

## 2020-08-13 ENCOUNTER — OFFICE VISIT (OUTPATIENT)
Dept: ORTHOPEDIC SURGERY | Age: 75
End: 2020-08-13
Payer: MEDICARE

## 2020-08-13 VITALS — BODY MASS INDEX: 24.77 KG/M2 | HEIGHT: 74 IN | WEIGHT: 193 LBS | TEMPERATURE: 97.1 F | RESPIRATION RATE: 12 BRPM

## 2020-08-13 PROCEDURE — 4040F PNEUMOC VAC/ADMIN/RCVD: CPT | Performed by: PHYSICAL MEDICINE & REHABILITATION

## 2020-08-13 PROCEDURE — G8420 CALC BMI NORM PARAMETERS: HCPCS | Performed by: PHYSICAL MEDICINE & REHABILITATION

## 2020-08-13 PROCEDURE — G8427 DOCREV CUR MEDS BY ELIG CLIN: HCPCS | Performed by: PHYSICAL MEDICINE & REHABILITATION

## 2020-08-13 PROCEDURE — 99204 OFFICE O/P NEW MOD 45 MIN: CPT | Performed by: PHYSICAL MEDICINE & REHABILITATION

## 2020-08-13 PROCEDURE — G8420 CALC BMI NORM PARAMETERS: HCPCS | Performed by: NURSE PRACTITIONER

## 2020-08-13 PROCEDURE — 1123F ACP DISCUSS/DSCN MKR DOCD: CPT | Performed by: PHYSICAL MEDICINE & REHABILITATION

## 2020-08-13 PROCEDURE — G8428 CUR MEDS NOT DOCUMENT: HCPCS | Performed by: NURSE PRACTITIONER

## 2020-08-13 PROCEDURE — 1036F TOBACCO NON-USER: CPT | Performed by: PHYSICAL MEDICINE & REHABILITATION

## 2020-08-13 PROCEDURE — 99211 OFF/OP EST MAY X REQ PHY/QHP: CPT | Performed by: NURSE PRACTITIONER

## 2020-08-13 PROCEDURE — 3017F COLORECTAL CA SCREEN DOC REV: CPT | Performed by: PHYSICAL MEDICINE & REHABILITATION

## 2020-08-13 NOTE — PROGRESS NOTES
New Patient: SPINE    Referring Provider:  No ref. provider found    Chief Complaint   Patient presents with    Neck Pain     NP, CSP       HISTORY OF PRESENT ILLNESS:      · The patient is being sent at the request of No ref. provider found in consultation as a new spine patient for neck pain. The patient is a 69 y.o. male whom reports symptoms for 2-3 months. Symptoms progressed over the last  2-3 months. Patient reports there was not a significant event to cause the symptoms. Today discomfort is report at 1 out of 10, describing it as tingling, firmness, stiffness. Symptoms are aggravated by: movement, touching. Patient has undergone recent treatment including, oral NSAIDs and EMG of left upper extremity. Patient denies previous cervical spine surgery. · Mr. Zak Vasques presents for neck pain and tingling down the left arm for 2-3 months which has worsened over the last 2 months. He rates the discomfort 1/10 and describes it as tingling and stiffness. The patient reports that he saw a hand specialist who referred him here for his neck. He states the tingling radiates down the left arm, to the left shoulder and to the left jaw and ear. He states he can stop the tingling by touching the affected area and applying light pressure. The patient notes that he may have slept in a different position that brought on these symptoms. He states the discomfort has gotten better and over time it has dissipated. The patient has not tried physical therapy for his neck, however he does state he had acupuncture for his elbow a few years ago which helped considerably. He is interested in trying acupuncture for his neck, however he isn't sure if this would be beneficial.  Alleviating factors include putting pressure on the tingling sensation. Recent treatment include NSAIDs and an EMG of the left upper extremity.      Pain Assessment  Location of Pain: Neck  Location Modifiers: Posterior, Left(Shoulder)  Severity of Pain: 0  Quality of Pain: Other (Comment)(Tingling; Firmness; Stiffness)  Duration of Pain: Persistent  Frequency of Pain: Intermittent  Aggravating Factors: Other (Comment)(Direct pressure)  Limiting Behavior: Yes  Relieving Factors: Rest  Result of Injury: No  Work-Related Injury: No  Are there other pain locations you wish to document?: No      Associated signs and symptoms:   Neurogenic bowel or bladder symptoms:  no   Perceived weakness:  no   Difficulty walking:  no    Recent Imaging (within past one year)   Xrays: no   MRI or CT of spine: no    Current/Past Treatment:   · Physical Therapy:  none  · Chiropractic:  none  · Injection:  none  · Medications:   NSAIDS:  yes   Muscle relaxer:  none   Steriods:  none   Neuropathic medications:  none   Opioids:  none  · Previous surgery:  no  · Previous surgical consult:  no  · Other:  · Infection control  · Tested positive for MRSA in past 12 months:  no  · Tested positive for MSSA \"staph infection\" in past 12 months: no  · Tested positive for VRE (Vancomycin Resistant Enterococci) in past 12 months:   no  · Currently on any antibiotics for an infection: no  · Anticoagulants:  · On a blood thinner:  yes CO-Q10  · Any history of bleeding disorder: no   · MRI Contraindication: no   · Previous Pain Management: no   · Goal for treatment: Reduce pain / improve function  · How long can you stand? Varies   Sit? Varies       Walk? Varies      Past medical, surgical, social and family history reviewed with the patient.  No pertinent relevant history            Past Medical History:   Past Medical History:   Diagnosis Date    Cephalgia     Hyperlipidemia     Left knee pain       Past Surgical History:     Past Surgical History:   Procedure Laterality Date    COLONOSCOPY  3/2013    normal Dr Avel Ruvalcaba repeat 2023    KNEE CARTILAGE SURGERY  1980\"S    LEFT KNEE    PATELLA SURGERY  1980'S    LEFT    TONSILLECTOMY AND ADENOIDECTOMY  1952     Current Medications:     Current Outpatient Medications:     meloxicam (MOBIC) 7.5 MG tablet, TAKE 1 TABLET BY MOUTH ONE TIME A DAY, Disp: 90 tablet, Rfl: 1    atorvastatin (LIPITOR) 40 MG tablet, TAKE ONE TABLET BY MOUTH DAILY, Disp: 90 tablet, Rfl: 1    diclofenac sodium 1 % GEL, Apply 2 g topically 4 times daily, Disp: 1 Tube, Rfl: 0    aspirin-acetaminophen-caffeine (EXCEDRIN MIGRAINE) 250-250-65 MG per tablet, Take 1 tablet by mouth every 6 hours as needed for Headaches, Disp: , Rfl:     ondansetron (ZOFRAN) 4 MG tablet, Take 1 tablet by mouth every 8 hours as needed for Nausea., Disp: 6 tablet, Rfl: 2    Vitamin D (CHOLECALCIFEROL) 1000 UNITS CAPS capsule, Take 1,000 Units by mouth daily. , Disp: , Rfl:     Saw Westhoff 450 MG CAPS, Take 1 tablet by mouth 2 times daily. , Disp: , Rfl:     Misc Natural Products (OSTEO BI-FLEX ADV TRIPLE ST) TABS, Take 1 tablet by mouth 2 times daily. , Disp: , Rfl:     Coenzyme Q-10 100 MG CAPS, Take 1 tablet by mouth daily. , Disp: , Rfl:     Multiple Vitamin (MULTI VITAMIN MENS PO), Take by mouth daily , Disp: , Rfl:   Allergies:  Patient has no known allergies. Social History:    reports that he has never smoked. He has never used smokeless tobacco. He reports current alcohol use. He reports that he does not use drugs. Family History:   Family History   Problem Relation Age of Onset   Atha Sunny Cancer Mother     Cancer Father         LIVER       REVIEW OF SYSTEMS: ROS - 14 point    Constitutional: No fevers, chills, night sweats, unexplained weight loss  Eye: No vision changes or diplopia  ENT: No nasal congestion, postnasal drip or sore throat.  No tinnitus  Respiratory: No cough or SOB  CV: No chest pain or palpitations  GI: No nausea, abdominal pain, stool changes  : No dysuria or hematuria  Skin: No new or changing skin lesions, no rashes  MSK: No joint swelling, morning stiffness, unusual joint pain  Neurological: No headache, confusion, syncope  Psychiatric: No excessive anxiety or depression  Endocrine: No polyuria or polydipsia  Hematologic: No lymph node enlargement or excessive bleeding  Immunologic:No history of immune deficiency or immunomodulating drugs           PHYSICAL EXAM:    Vitals: Temperature 97.1 °F (36.2 °C), resp. rate 12, height 6' 2\" (1.88 m), weight 193 lb (87.5 kg). GENERAL EXAM:  · General Apparence: Patient is adequately groomed with no evidence of malnutrition. · Psychiatric: Orientation: The patient is oriented to time, place and person. The patient's mood and affect are appropriate   · Vascular: Examination reveals no swelling and palpation reveals no tenderness in upper or lower extremities. Good capillary refill. · The lymphatic examination of the neck, axillae and groin reveals all areas to be without enlargement or induration   Sensation is intact without deficit in the upper and lower extremities to light touch and pinprick  · Coordination of the upper and lower extremities are normal.    CERVICAL EXAMINATION:  · Inspection: Local inspection shows no step-off or bruising. Cervical alignment is normal. No instability is noted. · Palpation and Percussion: No evidence of tenderness at the midline. Paraspinal tenderness is not present. There is no paraspinal spasm. · Range of Motion:  limited by 25% in all planes due to pain   · Strength: 5/5 bilateral upper extremities  · Special Tests:   Spurling's and Capone's are negative bilaterally. Rodas and Impingement tests are negative bilaterally. · Skin:There are no rashes, ulcerations or lesions. · Reflexes: Bilaterally triceps, biceps and brachioradialis are 2+. Clonus absent bilaterally at the feet. No pathological reflexes are noted. · Gait & station:  normal, patient ambulates without assistance and no ataxia  · Additional Examinations:  · RIGHT UPPER EXTREMITY:  Inspection/examination of the right upper extremity does not show any tenderness, deformity or injury.  Range of motion is normal and pain-free. There is no gross instability. There are no rashes, ulcerations or lesions. Strength and tone are normal. No atrophy or abnormal movements are noted. · LEFT UPPER EXTREMITY: Inspection/examination of the left upper extremity does not show any tenderness, deformity or injury. Range of motion is normal and pain-free. There is no gross instability. There are no rashes, ulcerations or lesions. Strength and tone are normal. No atrophy or abnormal movements are noted. · Additional Examinations:  · RIGHT LOWER EXTREMITY: Inspection/examination of the right lower extremity does not show any tenderness, deformity or injury. Range of motion is unremarkable. There is no gross instability. There are no rashes, ulcerations or lesions. Strength and tone are normal. No atrophy or abnormal movements are noted. · LEFT LOWER EXTREMITY:  Inspection/examination of the left lower extremity does not show any tenderness, deformity or injury. Range of motion is unremarkable. There is no gross instability. There are no rashes, ulcerations or lesions. Strength and tone are normal. No atrophy or abnormal movements are noted.       Diagnostic Testing:    Xrays:   AP and lateral of the cervical spine taken today in the office show cervical scoliosis with multilevel cervical degenerative disc disease C4-C7 and facet arthropathy  MRI or CT:  None  EMG:  Left ulnar neuropathy at the elbow 7/9/20  Results for orders placed or performed in visit on 07/10/20   Urinalysis Reflex to Culture    Specimen: Urine, clean catch   Result Value Ref Range    Color, UA DK YELLOW Straw/Yellow    Clarity, UA Clear Clear    Glucose, Ur Negative Negative mg/dL    Bilirubin Urine Negative Negative    Ketones, Urine Negative Negative mg/dL    Specific Gravity, UA 1.030 1.005 - 1.030    Blood, Urine Negative Negative    pH, UA 5.5 5.0 - 8.0    Protein, UA Negative Negative mg/dL    Urobilinogen, Urine 0.2 <2.0 E.U./dL    Nitrite, Urine Negative Negative    Leukocyte Esterase, Urine Negative Negative    Microscopic Examination Not Indicated     Urine Type Cleancatch     Urine Reflex to Culture Not Indicated    TSH with Reflex   Result Value Ref Range    TSH 1.96 0.27 - 4.20 uIU/mL   PSA, Prostatic Specific Antigen   Result Value Ref Range    PSA 2.48 0.00 - 4.00 ng/mL   Lipid Panel   Result Value Ref Range    Cholesterol, Total 173 0 - 199 mg/dL    Triglycerides 98 0 - 150 mg/dL    HDL 50 40 - 60 mg/dL    LDL Calculated 103 (H) <100 mg/dL    VLDL Cholesterol Calculated 20 Not Established mg/dL   Comprehensive Metabolic Panel   Result Value Ref Range    Sodium 140 136 - 145 mmol/L    Potassium 4.2 3.5 - 5.1 mmol/L    Chloride 104 99 - 110 mmol/L    CO2 25 21 - 32 mmol/L    Anion Gap 11 3 - 16    Glucose 102 (H) 70 - 99 mg/dL    BUN 23 (H) 7 - 20 mg/dL    CREATININE 1.2 0.8 - 1.3 mg/dL    GFR Non- 59 (A) >60    GFR African American >60 >60    Calcium 9.5 8.3 - 10.6 mg/dL    Total Protein 7.1 6.4 - 8.2 g/dL    Alb 4.4 3.4 - 5.0 g/dL    Albumin/Globulin Ratio 1.6 1.1 - 2.2    Total Bilirubin 1.0 0.0 - 1.0 mg/dL    Alkaline Phosphatase 62 40 - 129 U/L    ALT 13 10 - 40 U/L    AST 16 15 - 37 U/L    Globulin 2.7 g/dL   CBC Auto Differential   Result Value Ref Range    WBC 5.3 4.0 - 11.0 K/uL    RBC 4.99 4.20 - 5.90 M/uL    Hemoglobin 15.2 13.5 - 17.5 g/dL    Hematocrit 46.0 40.5 - 52.5 %    MCV 92.2 80.0 - 100.0 fL    MCH 30.5 26.0 - 34.0 pg    MCHC 33.1 31.0 - 36.0 g/dL    RDW 13.8 12.4 - 15.4 %    Platelets 091 092 - 888 K/uL    MPV 9.3 5.0 - 10.5 fL    Neutrophils % 44.1 %    Lymphocytes % 36.5 %    Monocytes % 11.5 %    Eosinophils % 7.3 %    Basophils % 0.6 %    Neutrophils Absolute 2.3 1.7 - 7.7 K/uL    Lymphocytes Absolute 1.9 1.0 - 5.1 K/uL    Monocytes Absolute 0.6 0.0 - 1.3 K/uL    Eosinophils Absolute 0.4 0.0 - 0.6 K/uL    Basophils Absolute 0.0 0.0 - 0.2 K/uL       Impression (Medical Decision Making):       1.  Cervical spondylosis without myelopathy    2. Neck pain    3. Foraminal stenosis of cervical region    4. Cubital tunnel syndrome on left        Plan (Medical Decision Making):    I discussed the diagnosis and the treatment options with Jo Aguillon today. In Summary:  The various treatment options were outlined and discussed with Jo Aguillon including:  Conservative care options: physical therapy, ice, medications, bracing, and activity modification. The indications for therapeutic injections. The indications for additional imaging/laboratory studies. The indications for (possible future) interventions. After considering the various options discussed, Jo Aguillon elected to pursue a course of treatment that includes the followin. Medications: Continue anti-inflammatories with appropriate GI Precautions including to stop if develop dark tarry stools or GI upset and to take with food. 2. PT:  Encouraged to continue with Home exercise program.    3. Further studies: COVID-19 PCR prior to injection      4. Interventional:  We discussed pursuing cervical medial branch blocks for diagnostic purposes. Based on physical exam findings of increased pain with facet loading and radiologic imaging, we will pursue medial branch blocks at the left C3, C4, C5 and C6 levels. Risks, benefits and alternatives were discussed. These include but are not limited to bleeding, infection, increased pain, lack of pain relief and nerve injury. The patient verbalized understanding and would like to proceed. If there is significant pain relief and functional improvement, the patient may be a good candidate for Radiofrequency ablation.     5. Healthy Lifestyle Measures:  Patient education material reviewing the following was distributed to Jo Aguillon  Anatomic drawings  Healthy lifestyle education  Osteoporosis prevention,   Back and neck pain educational information   Advanced imaging preparedness    Posture education   Proper lifting and carrying techniques,   Weight management  Quitting smoking and   Minor ways to treat back pain  For further information regarding the spine conditions and to review interventional treatments the patient was directed to [facility].    6.  Follow up:  2-3 weeks    Michelle Matta was instructed to call the office if his symptoms worsen or if new symptoms appear prior to the next scheduled visit. He is specifically instructed to contact the office between now & his scheduled appointment if he has concerns related to his condition or if he needs assistance in scheduling the above tests. He is welcome to call for an appointment sooner if he has any additional concerns or questions. Marina PITTS, vidal scribing for Dr. Maryan Maharaj.  08/13/20 9:19 AM Marina Augustin. The physical examination was performed between the patient and Dr. Maryan Maharaj. All counseling during the appointment was performed between the patient and the provider. Dr. Maryan PITTS, personally performed the services described in this documentation as scribed by Marina Rubin ATC in my presence and it is both accurate and complete. Fátima Herrera. Rob Welch MD, VINNIE, [facility]  Board Certified in [address] [place] [address] Certified and Fellowship Trained in [facility] ([facility])     This dictation was performed with a verbal recognition program [facility]) and it was checked for errors. It is possible that there are still dictated errors within this office note. If so, please bring any errors to my attention for an addendum. All efforts were made to ensure that this office note is accurate.

## 2020-08-13 NOTE — LETTER
Please schedule the following with:     Date:   20 @ 10:30    Account: [de-identified]  Patient: Linette Ramachandran    : 1945  Address:  [address]  [place] [address]    Phone (H):  [phone] (home)      ----------------------------------------------------------------------------------------------  Diagnosis:     ICD-10-CM    1. Cervical spondylosis without myelopathy  M47.812    2. Neck pain  M54.2 XR CERVICAL SPINE (2-3 VIEWS)   3. Foraminal stenosis of cervical region  M48.02    4. Cubital tunnel syndrome on left  G56.22          Levels:Left C3, C4, C5 and C6 medial branch blocks with steroid #1  CPT Codes 37564, 62071    ----------------------------------------------------------------------------------------------  Injection # 1   ASC    Attending Physician       Gus May.  Nkechi Reyna MD.      ----------------------------------------------------------------------------------------------  Injection Scheduled For:    At:    1st Insurance [facility]   Pre-Cert#    2nd Insurance     Pre-Cert#    Comments or Special instructions: WITH STEROID    · Infection control  · Tested positive for MRSA in past 12 months:  no  · Tested positive for MSSA \"staph infection\" in past 12 months: no  · Tested positive for VRE (Vancomycin Resistant Enterococci) in past 12 months:   no  · Currently on any antibiotics for an infection: no  · Anticoagulants:  · On a blood thinner:  no   · Any history of bleeding disorder: no   · Advanced Liver disease: no   · Advanced Renal disease: no   · Glaucoma: no   · Diabetes: no     Sedation:  Yes  -----------------------------------------------------------------------------------------------  No Known Allergies

## 2020-08-13 NOTE — TELEPHONE ENCOUNTER
T/c. No answer. lmovm instructing patient to call back with any further questions.  [phone] is the number to schedule covid testing

## 2020-08-13 NOTE — TELEPHONE ENCOUNTER
Auth: NPR  Date: 8/19/2020  Reference # None  Spoke with: None  Type of SX: Outpatient  MBB #1  Location: [facility]  CPT 97888, 16029, [id number]   SX area: Cervical spine  Insurance: [facility] Medicare

## 2020-08-16 LAB
SARS-COV-2: NOT DETECTED
SOURCE: NORMAL

## 2020-08-17 ENCOUNTER — TELEPHONE (OUTPATIENT)
Dept: ORTHOPEDIC SURGERY | Age: 75
End: 2020-08-17

## 2020-08-17 NOTE — PROGRESS NOTES
There is a one visitor policy at [facility] for all surgeries and endoscopies. Whether the visitor can stay or will be asked to wait in the car will depend on the current policy and if social distancing can be maintained. The policy is subject to change at any time. Please make sure the visitor has a cell phone that is on,charged and able to accept calls, as this may be the way that the staff communicates with them. Pain management is NO VISITOR policyThe patients ride is expected to remain in the car with a cell phone for communication. If the ride is leaving the hospital grounds please make sure they are back in time for pickup. Have the patient inform the staff on arrival what their rides plans are while the patient is in the facility. At the MAIN there is one visitor allowed. Please note that the visitor policy is subject to change. PATIENT REACHED   YES____NO__X__    PREOP INSTUCTIONS LEFT ON  KBOQZX____[phone]___________      DATE__8/19/2020_______ TIME__1030_______ARRIVAL__0930______PLACE__MASC__________  NOTHING TO EAT OR DRINK  AFTER MIDNIGHT THE EVENING PRIOR OR AS INSTRUCTED BY YOUR DR.  Dana Lesch NEED A RESPONSIBLE ADULT AGE 18 OR OLDER TO DRIVE YOU HOME  PLEASE BRING INSURANCE CARD. PICTURE ID AND COMPLETE LIST OF MEDS  WEAR LOOSE COMFORTABLE CLOTHING  FOLLOW ANY INSTRUCTIONS YOUR DRS OFFICE HAS GIVEN YOU,INCLUDING WHAT MEDICATIONS TO TAKE THE AM OF PROCEDURE AND WHEN AND IF YOU NEED TO STOP ANY BLOOD THINNERS. IF YOU HAVE QUESTIONS REGARDING THIS CALL THE OFFICE  THE GOAL BLOOD SUGAR THE AM OF PROCEDURE  OR LESS ABOVE THAT THE PROCEDURE MAY BE CANCELLED  ANY QUESTIONS CALL YOUR DOCTOR. ALSO,PLEASE READ THE INSTRUCTION PACKET FROM YOUR DR IF YOU RECEIVED ONE.   SPINE INTERVENTION NUMBER [phone]

## 2020-08-17 NOTE — TELEPHONE ENCOUNTER
S/w patient. He has been rescheduled to 9/2/20 @ 10:20am.  Mayela testing has been rescheduled, along with follow up appointment. All questions answered.

## 2020-08-18 ENCOUNTER — TELEPHONE (OUTPATIENT)
Dept: ORTHOPEDIC SURGERY | Age: 75
End: 2020-08-18

## 2020-08-27 ENCOUNTER — OFFICE VISIT (OUTPATIENT)
Dept: PRIMARY CARE CLINIC | Age: 75
End: 2020-08-27
Payer: MEDICARE

## 2020-08-27 PROCEDURE — G8428 CUR MEDS NOT DOCUMENT: HCPCS | Performed by: NURSE PRACTITIONER

## 2020-08-27 PROCEDURE — G8420 CALC BMI NORM PARAMETERS: HCPCS | Performed by: NURSE PRACTITIONER

## 2020-08-27 PROCEDURE — 99211 OFF/OP EST MAY X REQ PHY/QHP: CPT | Performed by: NURSE PRACTITIONER

## 2020-08-28 ENCOUNTER — TELEPHONE (OUTPATIENT)
Dept: ORTHOPEDIC SURGERY | Age: 75
End: 2020-08-28

## 2020-08-28 LAB — SARS-COV-2, NAA: NOT DETECTED

## 2020-08-28 NOTE — TELEPHONE ENCOUNTER
S/w patient. Patient has many clinical questions regarding the process of mbb 1, mbb2 and then RFA. Patient was insistent on speaking over the phone with Dr. Mayra Pete. He refused office visit for discussion. He is looking for permanent relief. He is scheduled for csp mbb w/steriod #1 on 9/2/20. He does not have a follow scheduled for mbb 1. He is unhappy about scheduling a follow up      Please call this patient. Thank you.

## 2020-08-28 NOTE — PROGRESS NOTES
PATIENT REACHED   YES__X__NO____    PREOP INSTUCTIONS   Patient instructed to get their COVID-19 test done as directed by their doctor (5-7 days prior to procedure)  or patient states will get on __8/27________. Patient was notified that they need to have an appointment,number to call provided. The day the COVID test is done is considered day one. Instructed to self quarantine after test until DOS. There is [facility] and endoscopies. Whether the visitor can stay or will be asked to wait in the car will depend on the current policy and if social distancing can be maintained. The policy is subject to change at any time. Please make sure the visitor has a cell phone that is on,charged and able to accept calls, as this may be the way that the staff communicates with them. Pain management is NO VISITOR policyThe patients ride is expected to remain in the car with a cell phone for communication. If the ride is leaving the hospital grounds please make sure they are back in time for pickup. Have the patient inform the staff on arrival what their rides plans are while the patient is in the facility. At the MAIN there is one visitor allowed. Please note that the visitor policy is subject to change. DATE_9/2/20________ TIME__1020_______ARRIVAL__0920______PLACE__masc__________  NOTHING TO EAT OR DRINK  AFTER MIDNIGHT THE EVENING PRIOR OR AS INSTRUCTED BY YOUR DR.  Christina Gonzales NEED A RESPONSIBLE ADULT AGE 18 OR OLDER TO DRIVE YOU HOME  PLEASE BRING INSURANCE CARD. PICTURE ID AND COMPLETE LIST OF MEDS  WEAR LOOSE COMFORTABLE CLOTHING  FOLLOW ANY INSTRUCTIONS YOUR DRS OFFICE HAS GIVEN YOU,INCLUDING WHAT MEDICATIONS TO TAKE THE AM OF PROCEDURE AND WHEN AND IF YOU NEED TO STOP ANY BLOOD THINNERS. IF YOU HAVE QUESTIONS REGARDING THIS CALL THE OFFICE  THE GOAL BLOOD SUGAR THE AM OF PROCEDURE  OR LESS ABOVE THAT THE PROCEDURE MAY BE CANCELLED  ANY QUESTIONS CALL YOUR DOCTOR. ALSO,PLEASE READ THE INSTRUCTION PACKET FROM YOUR DR IF YOU RECEIVED ONE.   SPINE INTERVENTION NUMBER [phone]

## 2020-09-02 ENCOUNTER — APPOINTMENT (OUTPATIENT)
Dept: GENERAL RADIOLOGY | Age: 75
End: 2020-09-02
Attending: PHYSICAL MEDICINE & REHABILITATION
Payer: MEDICARE

## 2020-09-02 ENCOUNTER — HOSPITAL ENCOUNTER (OUTPATIENT)
Age: 75
Setting detail: OUTPATIENT SURGERY
Discharge: HOME | End: 2020-09-02
Attending: PHYSICAL MEDICINE & REHABILITATION | Admitting: PHYSICAL MEDICINE & REHABILITATION
Payer: MEDICARE

## 2020-09-02 VITALS
TEMPERATURE: 97.9 F | SYSTOLIC BLOOD PRESSURE: 136 MMHG | DIASTOLIC BLOOD PRESSURE: 75 MMHG | OXYGEN SATURATION: 98 % | BODY MASS INDEX: 24.38 KG/M2 | HEIGHT: 74 IN | RESPIRATION RATE: 18 BRPM | WEIGHT: 190 LBS | HEART RATE: 63 BPM

## 2020-09-02 PROCEDURE — 3209999900 FLUORO FOR SURGICAL PROCEDURES

## 2020-09-02 PROCEDURE — 6360000002 HC RX W HCPCS: Performed by: PHYSICAL MEDICINE & REHABILITATION

## 2020-09-02 PROCEDURE — 2709999900 HC NON-CHARGEABLE SUPPLY: Performed by: PHYSICAL MEDICINE & REHABILITATION

## 2020-09-02 PROCEDURE — 3610000056 HC PAIN LEVEL 4 BASE (NON-OR): Performed by: PHYSICAL MEDICINE & REHABILITATION

## 2020-09-02 PROCEDURE — 99152 MOD SED SAME PHYS/QHP 5/>YRS: CPT | Performed by: PHYSICAL MEDICINE & REHABILITATION

## 2020-09-02 PROCEDURE — 2500000003 HC RX 250 WO HCPCS: Performed by: PHYSICAL MEDICINE & REHABILITATION

## 2020-09-02 RX ORDER — FENTANYL CITRATE 50 UG/ML
INJECTION, SOLUTION INTRAMUSCULAR; INTRAVENOUS
Status: COMPLETED | OUTPATIENT
Start: 2020-09-02 | End: 2020-09-02

## 2020-09-02 RX ORDER — BUPIVACAINE HYDROCHLORIDE 2.5 MG/ML
INJECTION, SOLUTION INFILTRATION; PERINEURAL
Status: COMPLETED | OUTPATIENT
Start: 2020-09-02 | End: 2020-09-02

## 2020-09-02 RX ORDER — DEXAMETHASONE SODIUM PHOSPHATE 10 MG/ML
INJECTION, SOLUTION INTRAMUSCULAR; INTRAVENOUS
Status: COMPLETED | OUTPATIENT
Start: 2020-09-02 | End: 2020-09-02

## 2020-09-02 RX ORDER — MIDAZOLAM HYDROCHLORIDE 1 MG/ML
INJECTION INTRAMUSCULAR; INTRAVENOUS
Status: COMPLETED | OUTPATIENT
Start: 2020-09-02 | End: 2020-09-02

## 2020-09-02 RX ORDER — LIDOCAINE HYDROCHLORIDE 10 MG/ML
INJECTION, SOLUTION EPIDURAL; INFILTRATION; INTRACAUDAL; PERINEURAL
Status: COMPLETED | OUTPATIENT
Start: 2020-09-02 | End: 2020-09-02

## 2020-09-02 ASSESSMENT — PAIN SCALES - GENERAL
PAINLEVEL_OUTOF10: 0
PAINLEVEL_OUTOF10: 0

## 2020-09-02 ASSESSMENT — PAIN - FUNCTIONAL ASSESSMENT: PAIN_FUNCTIONAL_ASSESSMENT: 0-10

## 2020-09-02 ASSESSMENT — PAIN DESCRIPTION - DESCRIPTORS: DESCRIPTORS: TINGLING

## 2020-09-02 NOTE — H&P
HISTORY AND PHYSICAL/PRE-SEDATION ASSESSMENT    Patient:  Iván Limon   :  1945  Medical Record No.:  [id number]   Date:  2020  Physician:  Holland Ibarra M.D. Facility: [address] [place] [address]    HISTORY OF PRESENT ILLNESS:                 The patient is a 69 y.o. male whom presents with left sided neck pain. Review of the imaging and physical exam of the patient confirmed the pre-procedure diagnosis. After a thorough discussion of risks, benefits and alternatives informed consent was obtained. Past Medical History:   Past Medical History:   Diagnosis Date    Cephalgia     Hyperlipidemia     Left knee pain       Past Surgical History:     Past Surgical History:   Procedure Laterality Date    COLONOSCOPY  3/2013    normal Dr Jam Bass repeat     KNEE CARTILAGE SURGERY  \"S    LEFT KNEE    PATELLA SURGERY  'S    LEFT    TONSILLECTOMY AND ADENOIDECTOMY       Current Medications:   Prior to Admission medications    Medication Sig Start Date End Date Taking? Authorizing Provider   meloxicam (MOBIC) 7.5 MG tablet TAKE 1 TABLET BY MOUTH ONE TIME A DAY 20  Yes Arian Sevilla MD   atorvastatin (LIPITOR) 40 MG tablet TAKE ONE TABLET BY MOUTH DAILY 20  Yes Arian Sevilla MD   aspirin-acetaminophen-caffeine (EXCEDRIN MIGRAINE) 771-452-63 MG per tablet Take 1 tablet by mouth every 6 hours as needed for Headaches   Yes Historical Provider, MD   Vitamin D (CHOLECALCIFEROL) 1000 UNITS CAPS capsule Take 1,000 Units by mouth daily. Yes Historical Provider, MD   Coenzyme Q-10 100 MG CAPS Take 1 tablet by mouth daily. Yes Historical Provider, MD   Multiple Vitamin (MULTI VITAMIN MENS PO) Take by mouth daily    Yes Historical Provider, MD   diclofenac sodium 1 % GEL Apply 2 g topically 4 times daily 20   Arian Sevilla MD   ondansetron (ZOFRAN) 4 MG tablet Take 1 tablet by mouth every 8 hours as needed for Nausea.  12/15/14   Andie Cheng MD   Saw [place] 450 MG CAPS Take 1 tablet by mouth 2 times daily. Historical Provider, MD     Allergies:  Patient has no known allergies. Social History:    reports that he has never smoked. He has never used smokeless tobacco. He reports current alcohol use. He reports that he does not use drugs. Family History:   Family History   Problem Relation Age of Onset   Meg Jacobsen Cancer Mother     Cancer Father         LIVER       Vitals: Blood pressure 137/83, pulse 62, temperature 97.9 °F (36.6 °C), temperature source Temporal, resp. rate 16, height 6' 2\" (1.88 m), weight 190 lb (86.2 kg), SpO2 98 %. PHYSICAL EXAM:including affected areas  HENT: Airway patent and reviewed  Cardiovascular: Normal rate, regular rhythm, normal heart sounds. Pulmonary/Chest: No wheezes. No rhonchi. No rales. Abdominal: Soft. Bowel sounds are normal. No distension. Extremities: Moves all extremities equally  Cervical and Lumbar Spine: Painful range of motion, no midline tenderness       Diagnosis:Cervical spondylosis without radiculopathy  M47.812  M54.2  M48.02  G56.22    Plan: Proceed with planned procedure      ASA CLASS:         []   I. Normal, healthy adult           [x]   II.  Mild systemic disease            []   III. Severe systemic disease      Mallampati: Mallampati Class II - (soft palate, fauces & uvula are visible)      Sedation plan:   [x]  Local              []  Minimal                  []  General anesthesia    Patient's condition acceptable for planned procedure/sedation. Post Procedure Plan   Return to same level of care   ______________________     The patient was counseled at length about the risks of hiram Covid-19 in the mook-operative and post-operative states including the recovery window of their procedure. The patient was made aware that hiram Covid-19 after a surgical procedure may worsen their prognosis for recovering from the virus and lend to a higher morbidity and or mortality risk.   The

## 2020-09-02 NOTE — OP NOTE
Patient:  Dolores Mcconnell  YOB: 1945  Medical Record #:  [id number]   Place:  [address] [place] [address]  Date:  9/2/2020   Physician:  Betsy Silva MD, VINNIE    Procedure:  Cervical Medial Branch Blocks - left C3, C4, C5 and C6     CPT 65204, 04743, 94354    Pre-Procedure Diagnosis: Cervical spondylosis without radiculopathy    Post-Procedure Diagnosis: Same    Sedation: Local with 1% Lidocaine 3 ml and 1 mg of IV Versed and 25 mcg of IV Fentanyl    EBL: None    Complications: None    Procedure Summary:    The patient was seen in the office for complaints of left sided neck pain. Review of the imaging and physical exam of the patient confirmed the pre-procedure diagnosis. After a thorough discussion of risks, benefits and alternatives informed consent was obtained. The patient was brought to the procedure suite and placed in the prone position. The skin overlying the cervical spine was prepped and draped in the usual sterile fashion. Using fluoroscopic guidance, the C3 levels were identified. Through anesthetized skin a 22 gauge 3.5 inch curved tip spinal needle was advanced to the waist of the articular pillar at the C4. Isovue M300 was instilled showing a nerve root outline pattern, without evidence of vascular spread. A total of 0.5 ml of 0.25% Marcaine mixed with 10 mg of dexamethasone was instilled at C5 medial branch. This was repeated for the C6 medial branches. The needles were removed and a band-aid applied. The patient was transferred to the post-operative area in stable condition.

## 2020-09-10 ENCOUNTER — OFFICE VISIT (OUTPATIENT)
Dept: ORTHOPEDIC SURGERY | Age: 75
End: 2020-09-10
Payer: MEDICARE

## 2020-09-10 VITALS — HEIGHT: 74 IN | TEMPERATURE: 98.6 F | WEIGHT: 190.04 LBS | BODY MASS INDEX: 24.39 KG/M2 | RESPIRATION RATE: 12 BRPM

## 2020-09-10 PROCEDURE — 4040F PNEUMOC VAC/ADMIN/RCVD: CPT | Performed by: PHYSICAL MEDICINE & REHABILITATION

## 2020-09-10 PROCEDURE — G8420 CALC BMI NORM PARAMETERS: HCPCS | Performed by: PHYSICAL MEDICINE & REHABILITATION

## 2020-09-10 PROCEDURE — 99213 OFFICE O/P EST LOW 20 MIN: CPT | Performed by: PHYSICAL MEDICINE & REHABILITATION

## 2020-09-10 PROCEDURE — 1123F ACP DISCUSS/DSCN MKR DOCD: CPT | Performed by: PHYSICAL MEDICINE & REHABILITATION

## 2020-09-10 PROCEDURE — 1036F TOBACCO NON-USER: CPT | Performed by: PHYSICAL MEDICINE & REHABILITATION

## 2020-09-10 PROCEDURE — 3017F COLORECTAL CA SCREEN DOC REV: CPT | Performed by: PHYSICAL MEDICINE & REHABILITATION

## 2020-09-10 PROCEDURE — G8427 DOCREV CUR MEDS BY ELIG CLIN: HCPCS | Performed by: PHYSICAL MEDICINE & REHABILITATION

## 2020-09-10 NOTE — PROGRESS NOTES
Follow up: [address] [place] [address]  1945  [id number]         Chief Complaint   Patient presents with    Neck Pain     9/2: CMBB #1 L C3, C4, C5, C6          HISTORY OF PRESENT ILLNESS:  Mr. Joselin Parks is a 69 y.o. male returns for a follow up visit for multiple medical problems. His current presenting problems are   1. Cervical spondylosis without myelopathy    2. Foraminal stenosis of cervical region    3. Neck pain    . As per information/history obtained from the PADT(patient assessment and documentation tool) - He complains of pain in the neck with radiation to the shoulders Left He rates the pain 0-1, moderate intensity and describes it as irritation. Pain is made worse by: movement. He denies side effects from the current pain regimen. Patient reports that since the last follow up visit the physical functioning is better, family/social relationships are better, mood is better and sleep patterns are better, and that the overall functioning is better. Patient denies neurological bowel or bladder. He presents after undergoing cervical medial branch blocks at the left C3-C4-C5 and C6 levels. He reports he has had moderate benefit of the irritation in his neck. He reports is not pain but more of an aggravation. He has had improvement in his symptoms though. He reports he is had some continued improvement in the frequency and intensity. Denies have any paresthesia into the upper extremities. Associated signs and symptoms:   Neurogenic bowel or bladder symptoms:  no   Perceived weakness:  no   Difficulty walking:  no            Past medical, surgical, social and family history reviewed with the patient.  No pertinent relevant history  Past Medical History:   Past Medical History:   Diagnosis Date    Cephalgia     Hyperlipidemia     Left knee pain       Past Surgical History:     Past Surgical History:   Procedure Laterality Date    COLONOSCOPY  3/2013    normal Dr Marzella Habermann repeat 2023   [facility] KNEE CARTILAGE SURGERY  1980\"S    LEFT KNEE    PAIN MANAGEMENT PROCEDURE Left 9/2/2020    LEFT C3, C4, C5 AND C6 MEDIAL BRANCH BLOCKS WITH STEROID WITH FLUOROSCOPY (41343, 53889)  #1 performed by Jeff Arevalo MD at [address]     Current Medications:     Current Outpatient Medications:     meloxicam (MOBIC) 7.5 MG tablet, TAKE 1 TABLET BY MOUTH ONE TIME A DAY, Disp: 90 tablet, Rfl: 1    atorvastatin (LIPITOR) 40 MG tablet, TAKE ONE TABLET BY MOUTH DAILY, Disp: 90 tablet, Rfl: 1    diclofenac sodium 1 % GEL, Apply 2 g topically 4 times daily, Disp: 1 Tube, Rfl: 0    aspirin-acetaminophen-caffeine (EXCEDRIN MIGRAINE) 250-250-65 MG per tablet, Take 1 tablet by mouth every 6 hours as needed for Headaches, Disp: , Rfl:     ondansetron (ZOFRAN) 4 MG tablet, Take 1 tablet by mouth every 8 hours as needed for Nausea., Disp: 6 tablet, Rfl: 2    Vitamin D (CHOLECALCIFEROL) 1000 UNITS CAPS capsule, Take 1,000 Units by mouth daily. , Disp: , Rfl:     Saw [place] 450 MG CAPS, Take 1 tablet by mouth 2 times daily. , Disp: , Rfl:     Coenzyme Q-10 100 MG CAPS, Take 1 tablet by mouth daily. , Disp: , Rfl:     Multiple Vitamin (MULTI VITAMIN MENS PO), Take by mouth daily , Disp: , Rfl:   Allergies:  Patient has no known allergies. Social History:    reports that he has never smoked. He has never used smokeless tobacco. He reports current alcohol use. He reports that he does not use drugs.   Family History:   Family History   Problem Relation Age of Onset   Katerin Mitchell Cancer Mother     Cancer Father         LIVER       REVIEW OF SYSTEMS:   CONSTITUTIONAL: Denies unexplained weight loss, fevers, chills or fatigue  NEUROLOGICAL: Denies unsteady gait or progressive weakness  MUSCULOSKELETAL: Denies joint swelling or redness  GI: Denies nausea, vomiting, diarrhea   : Denies bowel or bladder issues       PHYSICAL EXAM:    Vitals: Temperature 98.6 °F (37 tenderness, deformity or injury. Range of motion is unremarkable and pain-free. There is no gross instability. There are no rashes, ulcerations or lesions. Strength and tone are normal. No atrophy or abnormal movements are noted. Diagnostic Testing:    No new diagnostics  Results for orders placed or performed in visit on 20   COVID-19   Result Value Ref Range    SARS-CoV-2, ODALIS NOT DETECTED NOT DETECTED     Impression:       1. Cervical spondylosis without myelopathy    2. Foraminal stenosis of cervical region    3. Neck pain        Plan:  Clinical Course: Above diagnoses are improving     I discussed the diagnosis and the treatment options with Linette Ramachandran today. In Summary:  The various treatment options were outlined and discussed with Linette Ramachandran including:  Conservative care options: physical therapy, ice, medications, bracing, and activity modification. The indications for therapeutic injections. The indications for additional imaging/laboratory studies. The indications for (possible future) interventions. After considering the various options discussed, Linette Ramachandran elected to pursue a course of treatment that includes the followin. Medications:  No further recommendations for new medications. 2. PT:  Encouraged to continue with Home exercise program.    3. Further studies: No further studies. 4. Interventional:  At this point he has had moderate improvement in the symptoms of cervical spondylosis on the right side. We will give him 4 more weeks to continue with home exercise program and see if things change at all. If things do worsen he can proceed with a second set of medial branch blocks. 5. Follow up:  4-6 weeks      Linette Ramachandran was instructed to call the office if his symptoms worsen or if new symptoms appear prior to the next scheduled visit.  He is specifically instructed to contact the office between now & his scheduled appointment if he has concerns related to his condition or if he needs assistance in scheduling the above tests. He is welcome to call for an appointment sooner if he has any additional concerns or questions. Elizabeth Tenorio. Pina Davila MD, VINNIE, [facility]  Board Certified in [address] [place] [address] Certified and Fellowship Trained in [facility] ([facility])             This dictation was performed with a verbal recognition program [facility]) and it was checked for errors. It is possible that there are still dictated errors within this office note. If so, please bring any errors to my attention for an addendum. All efforts were made to ensure that this office note is accurate.

## 2020-10-08 ENCOUNTER — OFFICE VISIT (OUTPATIENT)
Dept: ORTHOPEDIC SURGERY | Age: 75
End: 2020-10-08
Payer: MEDICARE

## 2020-10-08 VITALS — WEIGHT: 190 LBS | TEMPERATURE: 97.4 F | BODY MASS INDEX: 24.38 KG/M2 | HEIGHT: 74 IN | RESPIRATION RATE: 12 BRPM

## 2020-10-08 PROCEDURE — G8420 CALC BMI NORM PARAMETERS: HCPCS | Performed by: PHYSICAL MEDICINE & REHABILITATION

## 2020-10-08 PROCEDURE — G8484 FLU IMMUNIZE NO ADMIN: HCPCS | Performed by: PHYSICAL MEDICINE & REHABILITATION

## 2020-10-08 PROCEDURE — 3017F COLORECTAL CA SCREEN DOC REV: CPT | Performed by: PHYSICAL MEDICINE & REHABILITATION

## 2020-10-08 PROCEDURE — 4040F PNEUMOC VAC/ADMIN/RCVD: CPT | Performed by: PHYSICAL MEDICINE & REHABILITATION

## 2020-10-08 PROCEDURE — 1123F ACP DISCUSS/DSCN MKR DOCD: CPT | Performed by: PHYSICAL MEDICINE & REHABILITATION

## 2020-10-08 PROCEDURE — G8427 DOCREV CUR MEDS BY ELIG CLIN: HCPCS | Performed by: PHYSICAL MEDICINE & REHABILITATION

## 2020-10-08 PROCEDURE — 1036F TOBACCO NON-USER: CPT | Performed by: PHYSICAL MEDICINE & REHABILITATION

## 2020-10-08 PROCEDURE — 99213 OFFICE O/P EST LOW 20 MIN: CPT | Performed by: PHYSICAL MEDICINE & REHABILITATION

## 2020-10-08 NOTE — PROGRESS NOTES
Follow up: Diego Paniagua  1945  [id number]         Chief Complaint   Patient presents with    Neck Pain     F/u CSP         HISTORY OF PRESENT ILLNESS:  Mr. Anna Fontenot is a 70 y.o. male returns for a follow up visit for multiple medical problems. His current presenting problems are   1. Cervical spondylosis without myelopathy    2. Foraminal stenosis of cervical region    3. Neck pain    . As per information/history obtained from the PADT(patient assessment and documentation tool) - He complains of pain in the neck with radiation to the head He rates the pain 1/10 and describes it as tingling. Pain is made worse by: movement. He denies side effects from the current pain regimen. Patient reports that since the last follow up visit the physical functioning is better, family/social relationships are better, mood is better and sleep patterns are better, and that the overall functioning is better. Patient denies neurological bowel or bladder. Returns due to continued neck pain on the left side. He reports his symptoms have started to improve. He only has a tingling sensation when he tilts his head. He cannot identify any time interval which this is happening. He denies have any paresthesia into the upper extremities. He reports that if he puts his hand over his chin that this alleviates his symptoms. Associated signs and symptoms:   Neurogenic bowel or bladder symptoms:  no   Perceived weakness:  no   Difficulty walking:  no            Past medical, surgical, social and family history reviewed with the patient.  No pertinent relevant history  Past Medical History:   Past Medical History:   Diagnosis Date    Cephalgia     Hyperlipidemia     Left knee pain       Past Surgical History:     Past Surgical History:   Procedure Laterality Date    COLONOSCOPY  3/2013    normal Dr Tavarez Early repeat 2023    KNEE CARTILAGE SURGERY  1980\"S    LEFT KNEE    PAIN MANAGEMENT PROCEDURE Left 9/2/2020 LEFT C3, C4, C5 AND C6 MEDIAL BRANCH BLOCKS WITH STEROID WITH FLUOROSCOPY (97647, 95415)  #1 performed by Joaquim Nielson MD at [address]     Current Medications:     Current Outpatient Medications:     meloxicam (MOBIC) 7.5 MG tablet, TAKE 1 TABLET BY MOUTH ONE TIME A DAY, Disp: 90 tablet, Rfl: 1    atorvastatin (LIPITOR) 40 MG tablet, TAKE ONE TABLET BY MOUTH DAILY, Disp: 90 tablet, Rfl: 1    diclofenac sodium 1 % GEL, Apply 2 g topically 4 times daily, Disp: 1 Tube, Rfl: 0    aspirin-acetaminophen-caffeine (EXCEDRIN MIGRAINE) 250-250-65 MG per tablet, Take 1 tablet by mouth every 6 hours as needed for Headaches, Disp: , Rfl:     ondansetron (ZOFRAN) 4 MG tablet, Take 1 tablet by mouth every 8 hours as needed for Nausea., Disp: 6 tablet, Rfl: 2    Vitamin D (CHOLECALCIFEROL) 1000 UNITS CAPS capsule, Take 1,000 Units by mouth daily. , Disp: , Rfl:     Saw [place] 450 MG CAPS, Take 1 tablet by mouth 2 times daily. , Disp: , Rfl:     Coenzyme Q-10 100 MG CAPS, Take 1 tablet by mouth daily. , Disp: , Rfl:     Multiple Vitamin (MULTI VITAMIN MENS PO), Take by mouth daily , Disp: , Rfl:   Allergies:  Patient has no known allergies. Social History:    reports that he has never smoked. He has never used smokeless tobacco. He reports current alcohol use. He reports that he does not use drugs. Family History:   Family History   Problem Relation Age of Onset   [employer] Cancer Mother     Cancer Father         LIVER       REVIEW OF SYSTEMS:   CONSTITUTIONAL: Denies unexplained weight loss, fevers, chills or fatigue  NEUROLOGICAL: Denies unsteady gait or progressive weakness  MUSCULOSKELETAL: Denies joint swelling or redness  GI: Denies nausea, vomiting, diarrhea   : Denies bowel or bladder issues       PHYSICAL EXAM:    Vitals: Temperature 97.4 °F (36.3 °C), resp. rate 12, height 6' 2.02\" (1.88 m), weight 190 lb (86.2 kg).     GENERAL ulcerations or lesions. Strength and tone are normal. No atrophy or abnormal movements are noted. Diagnostic Testing:    No new diagnostics  Results for orders placed or performed in visit on 20   COVID-19   Result Value Ref Range    SARS-CoV-2, ODALIS NOT DETECTED NOT DETECTED     Impression:       1. Cervical spondylosis without myelopathy    2. Foraminal stenosis of cervical region    3. Neck pain        Plan:  Clinical Course: Above diagnoses are improving     I discussed the diagnosis and the treatment options with Jamal Garcia today. In Summary:  The various treatment options were outlined and discussed with Jamal Garcia including:  Conservative care options: physical therapy, ice, medications, bracing, and activity modification. The indications for therapeutic injections. The indications for additional imaging/laboratory studies. The indications for (possible future) interventions. After considering the various options discussed, Jamal Garcia elected to pursue a course of treatment that includes the followin. Medications:  Continue anti-inflammatories with appropriate GI Precautions including to stop if develop dark tarry stools or GI upset and to take with food. 2. PT:  Encouraged to continue with Home exercise program.    3. Further studies: MRI cervical spine if symptoms worsen      4. Interventional:  None at this time    5. Follow up:  4-6 weeks      Jamal Garcia was instructed to call the office if his symptoms worsen or if new symptoms appear prior to the next scheduled visit. He is specifically instructed to contact the office between now & his scheduled appointment if he has concerns related to his condition or if he needs assistance in scheduling the above tests. He is welcome to call for an appointment sooner if he has any additional concerns or questions. Ellie Mares.  Fortunato Gabriel MD, VINNIE, [facility]  Board Certified in [address] [facility]  Board Certified and Fellowship Trained in [facility] ([facility])             This dictation was performed with a verbal recognition program [facility] ) and it was checked for errors. It is possible that there are still dictated errors within this office note. If so, please bring any errors to my attention for an addendum. All efforts were made to ensure that this office note is accurate.

## 2020-11-11 RX ORDER — ATORVASTATIN CALCIUM 40 MG/1
TABLET, FILM COATED ORAL
Qty: 90 TABLET | Refills: 0 | OUTPATIENT
Start: 2020-11-11

## 2020-11-11 RX ORDER — ATORVASTATIN CALCIUM 40 MG/1
TABLET, FILM COATED ORAL
Qty: 30 TABLET | Refills: 0 | Status: SHIPPED | OUTPATIENT
Start: 2020-11-11 | End: 2021-01-07

## 2020-12-10 ENCOUNTER — TELEPHONE (OUTPATIENT)
Dept: PHARMACY | Facility: CLINIC | Age: 75
End: 2020-12-10

## 2020-12-10 NOTE — TELEPHONE ENCOUNTER
CLINICAL PHARMACY: ADHERENCE REVIEW  Identified care gap per Arline; fills at [facility]: Statin adherence    Last Office Visit: 10/8/20    ASSESSMENT    STATIN ADHERENCE  [facility]: 82    Per [id number]   ATORVASTATIN TAB 40MG last filled on 8/10/20 for a 90 day supply; SI tab daily; last picked up on 8/10/20. 1 refills remaining. Billed through Medicare  Was filled 20 but put back to stock    Lab Results   Component Value Date    CHOL 173 07/10/2020    TRIG 98 07/10/2020    HDL 50 07/10/2020    LDLCALC 103 (H) 07/10/2020     ALT   Date Value Ref Range Status   07/10/2020 13 10 - 40 U/L Final     AST   Date Value Ref Range Status   07/10/2020 16 15 - 37 U/L Final     The 10-year ASCVD risk score (Donal Locke, et al., 2013) is: 22.8%    Values used to calculate the score:      Age: 70 years      Sex: Male      Is Non- : No      Diabetic: No      Tobacco smoker: No      Systolic Blood Pressure: 975 mmHg      Is BP treated: No      HDL Cholesterol: 50 mg/dL      Total Cholesterol: 173 mg/dL     PLAN  Attempting to reach patient to review.  Left message asking for return call.     Went right to VM, LM Darylene Morton, MarioD, [place] Pharmacist  Direct: [phone], Ext [phone]

## 2020-12-11 ENCOUNTER — TELEPHONE (OUTPATIENT)
Dept: INTERNAL MEDICINE CLINIC | Age: 75
End: 2020-12-11

## 2020-12-11 NOTE — TELEPHONE ENCOUNTER
Per Dr. Mcfarland Factor no need for appointment at this time, needs blood work and ok for refill. Patient will go ahead and get blood work done and at this time no refill is needed.

## 2020-12-11 NOTE — TELEPHONE ENCOUNTER
Patient returned my call. Patient states he did get ahead somehow and still has about a month left. Patient denies missing doses of atorvastatin and denies side effects. Takes in the morning. Patient agreeable to have refilled now and use up surplus before he fills in 2021. Educated patient this script at [facility] was only for 30 days and states he needs a an apt. Patient states he is not due for pcp apt, he was seen in July 2020 with follow up recommended in 1 year. Patient states he will call pcp to clarify. Jonatan Tejeda, PharmD, [place] Pharmacist  Direct: [phone], Ext [phone]  ===============================  CLINICAL PHARMACY CONSULT: MED RECONCILIATION/REVIEW ADDENDUM    For Pharmacy Admin Tracking Only    PHSO: Yes  Total # of Interventions Recommended: 1  - New Order #: 1 New Medication Order Reason(s):  Adherence  - Maintenance Safety Lab Monitoring #: 1  - New Therapy Lab Monitoring #: 1  Recommended intervention potential cost savings: 1  Total Interventions Accepted: 1  Time Spent (min): Lacy Maria, [address] [place] [address]

## 2021-01-07 RX ORDER — ATORVASTATIN CALCIUM 40 MG/1
TABLET, FILM COATED ORAL
Qty: 30 TABLET | Refills: 0 | Status: SHIPPED | OUTPATIENT
Start: 2021-01-07 | End: 2021-03-31 | Stop reason: SDUPTHER

## 2021-01-08 RX ORDER — ATORVASTATIN CALCIUM 40 MG/1
TABLET, FILM COATED ORAL
Qty: 30 TABLET | Refills: 0 | OUTPATIENT
Start: 2021-01-08

## 2021-01-19 ENCOUNTER — TELEPHONE (OUTPATIENT)
Dept: INTERNAL MEDICINE CLINIC | Age: 76
End: 2021-01-19

## 2021-01-19 RX ORDER — MELOXICAM 7.5 MG/1
TABLET ORAL
Qty: 60 TABLET | Refills: 2 | Status: SHIPPED | OUTPATIENT
Start: 2021-01-19 | End: 2021-03-29 | Stop reason: SDUPTHER

## 2021-01-19 NOTE — TELEPHONE ENCOUNTER
Patient advised to contact Health Dept [phone] and [facility] at [phone].      Patient lives in [facility] and also gave number [phone].    70 years and above to start scheduling 2/1/2021

## 2021-01-19 NOTE — TELEPHONE ENCOUNTER
Patient called wanting to speak to Dr. Nyla Hurst regarding concerns with getting the COVID 19 vaccine.      Please call to advise

## 2021-02-17 ENCOUNTER — OFFICE VISIT (OUTPATIENT)
Dept: INTERNAL MEDICINE CLINIC | Age: 76
End: 2021-02-17
Payer: MEDICARE

## 2021-02-17 ENCOUNTER — HOSPITAL ENCOUNTER (OUTPATIENT)
Dept: GENERAL RADIOLOGY | Age: 76
Discharge: HOME | End: 2021-02-17
Payer: MEDICARE

## 2021-02-17 ENCOUNTER — HOSPITAL ENCOUNTER (OUTPATIENT)
Age: 76
Discharge: HOME | End: 2021-02-17
Payer: MEDICARE

## 2021-02-17 VITALS
HEART RATE: 66 BPM | SYSTOLIC BLOOD PRESSURE: 122 MMHG | RESPIRATION RATE: 14 BRPM | DIASTOLIC BLOOD PRESSURE: 76 MMHG | HEIGHT: 74 IN | TEMPERATURE: 98 F | WEIGHT: 204.2 LBS | BODY MASS INDEX: 26.21 KG/M2

## 2021-02-17 DIAGNOSIS — M25.511 CHRONIC RIGHT SHOULDER PAIN: ICD-10-CM

## 2021-02-17 DIAGNOSIS — R20.2 TINGLING: ICD-10-CM

## 2021-02-17 DIAGNOSIS — M25.562 CHRONIC PAIN OF BOTH KNEES: ICD-10-CM

## 2021-02-17 DIAGNOSIS — E55.9 VITAMIN D DEFICIENCY: ICD-10-CM

## 2021-02-17 DIAGNOSIS — G89.29 CHRONIC PAIN OF BOTH KNEES: ICD-10-CM

## 2021-02-17 DIAGNOSIS — G89.29 CHRONIC RIGHT SHOULDER PAIN: ICD-10-CM

## 2021-02-17 DIAGNOSIS — M25.561 CHRONIC PAIN OF BOTH KNEES: ICD-10-CM

## 2021-02-17 DIAGNOSIS — E78.2 MIXED HYPERLIPIDEMIA: ICD-10-CM

## 2021-02-17 DIAGNOSIS — N20.0 NEPHROLITHIASIS: ICD-10-CM

## 2021-02-17 PROCEDURE — 3017F COLORECTAL CA SCREEN DOC REV: CPT | Performed by: INTERNAL MEDICINE

## 2021-02-17 PROCEDURE — 4040F PNEUMOC VAC/ADMIN/RCVD: CPT | Performed by: INTERNAL MEDICINE

## 2021-02-17 PROCEDURE — G8484 FLU IMMUNIZE NO ADMIN: HCPCS | Performed by: INTERNAL MEDICINE

## 2021-02-17 PROCEDURE — 99214 OFFICE O/P EST MOD 30 MIN: CPT | Performed by: INTERNAL MEDICINE

## 2021-02-17 PROCEDURE — G8419 CALC BMI OUT NRM PARAM NOF/U: HCPCS | Performed by: INTERNAL MEDICINE

## 2021-02-17 PROCEDURE — 1123F ACP DISCUSS/DSCN MKR DOCD: CPT | Performed by: INTERNAL MEDICINE

## 2021-02-17 PROCEDURE — G8427 DOCREV CUR MEDS BY ELIG CLIN: HCPCS | Performed by: INTERNAL MEDICINE

## 2021-02-17 PROCEDURE — 73030 X-RAY EXAM OF SHOULDER: CPT

## 2021-02-17 PROCEDURE — 1036F TOBACCO NON-USER: CPT | Performed by: INTERNAL MEDICINE

## 2021-02-17 RX ORDER — ASPIRIN 81 MG/1
81 TABLET ORAL DAILY
COMMUNITY
End: 2022-05-09 | Stop reason: ALTCHOICE

## 2021-02-17 ASSESSMENT — ENCOUNTER SYMPTOMS
ALLERGIC/IMMUNOLOGIC NEGATIVE: 1
RESPIRATORY NEGATIVE: 1
GASTROINTESTINAL NEGATIVE: 1
EYES NEGATIVE: 1

## 2021-02-17 ASSESSMENT — PATIENT HEALTH QUESTIONNAIRE - PHQ9
SUM OF ALL RESPONSES TO PHQ QUESTIONS 1-9: 0
1. LITTLE INTEREST OR PLEASURE IN DOING THINGS: 0

## 2021-02-17 NOTE — PROGRESS NOTES
Subjective:      Patient ID: Navi Torre is a 70 y.o. male. HPI  Here today for follow up of chronic problems as per HPI and as problems listed under assessment and plan,no new c/o feels good has on going R shoulder pain no injury but over use golf and pushups       No Known Allergies    Current Outpatient Medications   Medication Sig Dispense Refill    aspirin 81 MG EC tablet Take 81 mg by mouth daily      Zinc 40 MG TABS Take by mouth daily      meloxicam (MOBIC) 7.5 MG tablet TAKE 1 TABLET BY MOUTH ONE TIME A DAY  60 tablet 2    atorvastatin (LIPITOR) 40 MG tablet TAKE 1 TABLET BY MOUTH EVERY DAY **NEEDS AN APPOINTMENT** 30 tablet 0    diclofenac sodium 1 % GEL Apply 2 g topically 4 times daily 1 Tube 0    Vitamin D (CHOLECALCIFEROL) 1000 UNITS CAPS capsule Take 1,000 Units by mouth daily.  [place] 450 MG CAPS Take 1 tablet by mouth 2 times daily.  Coenzyme Q-10 100 MG CAPS Take 1 tablet by mouth daily.  Multiple Vitamin (MULTI VITAMIN MENS PO) Take by mouth daily        No current facility-administered medications for this visit.         Past Medical History:   Diagnosis Date    Cephalgia     Hyperlipidemia     Left knee pain        Family History   Problem Relation Age of Onset    Cancer Mother     Cancer Father         LIVER       Past Surgical History:   Procedure Laterality Date    COLONOSCOPY  3/2013    normal Dr Felicitas Ayala repeat 2023    KNEE CARTILAGE SURGERY  1980\"S    LEFT KNEE    PAIN MANAGEMENT PROCEDURE Left 9/2/2020    LEFT C3, C4, C5 AND C6 MEDIAL BRANCH BLOCKS WITH STEROID WITH FLUOROSCOPY (44673, 07755)  #1 performed by Tiesha Powell MD at [address]       Social History     Socioeconomic History    Marital status:      Spouse name: Not on file    Number of children: Not on file    Years of education: Not on file    Highest education level: Not on file Occupational History    Not on file   Social Needs    Financial resource strain: Not on file    Food insecurity     Worry: Not on file     Inability: Not on file    Transportation needs     Medical: Not on file     Non-medical: Not on file   Tobacco Use    Smoking status: Never Smoker    Smokeless tobacco: Never Used   Substance and Sexual Activity    Alcohol use: Yes     Comment: OCCASIONALLY    Drug use: No    Sexual activity: Not on file   Lifestyle    Physical activity     Days per week: Not on file     Minutes per session: Not on file    Stress: Not on file   Relationships    Social connections     Talks on phone: Not on file     Gets together: Not on file     Attends Zoroastrian service: Not on file     Active member of club or organization: Not on file     Attends meetings of clubs or organizations: Not on file     Relationship status: Not on file    Intimate partner violence     Fear of current or ex partner: Not on file     Emotionally abused: Not on file     Physically abused: Not on file     Forced sexual activity: Not on file   Other Topics Concern    Not on file   Social History Narrative    Not on file       Review of Systems  Review of Systems   Constitutional: Positive for unexpected weight change (down a few # ). HENT: Negative. Eyes: Negative. Glasses  Early cataracts    Respiratory: Negative. Cardiovascular: Negative. Gastrointestinal: Negative. Colonoscopy was ok repeat 2023   Endocrine: Negative. Genitourinary: Positive for frequency (occ nocturia ). Episode of renal colic as noted prn meds    Musculoskeletal: Positive for arthralgias ( L knee pain is getting worse ) and gait problem (L knee  pain on and off c/o ). Negative for myalgias. R shoulder pain see HPI    Skin: Negative. Allergic/Immunologic: Negative. Neurological: Positive for dizziness (occ sx  occ balance . sx ). Hematological: Negative. Psychiatric/Behavioral: Negative. Objective:   Physical Exam:  Physical Exam  Constitutional:       Appearance: He is well-developed. HENT:      Head: Normocephalic and atraumatic. Right Ear: External ear normal.      Left Ear: External ear normal.   Eyes:      Extraocular Movements: Extraocular movements intact. Conjunctiva/sclera: Conjunctivae normal.      Pupils: Pupils are equal, round, and reactive to light. Neck:      Musculoskeletal: Normal range of motion and neck supple. Thyroid: No thyromegaly. Trachea: No tracheal deviation. Cardiovascular:      Rate and Rhythm: Normal rate and regular rhythm. Pulses: Normal pulses. Heart sounds: Normal heart sounds. Pulmonary:      Effort: Pulmonary effort is normal.      Breath sounds: Normal breath sounds. Genitourinary:     Penis: No tenderness. Musculoskeletal: Normal range of motion. Comments: R shoulder pain and decrease ROM some popping in joint    Skin:     General: Skin is warm and dry. Neurological:      General: No focal deficit present. Mental Status: He is alert and oriented to person, place, and time. Deep Tendon Reflexes: Reflexes are normal and symmetric. Psychiatric:         Mood and Affect: Mood normal.         Behavior: Behavior normal.         Thought Content:  Thought content normal.         /76 (Site: Right Upper Arm, Position: Sitting, Cuff Size: Medium Adult)   Pulse 66   Temp 98 °F (36.7 °C) (Temporal)   Resp 14   Ht 6' 2\" (1.88 m)   Wt 204 lb 3.2 oz (92.6 kg)   BMI 26.22 kg/m²       Assessment & Plan:         Hyperlipidemia  Stable continue current meds and return cpe    repeat labs in 6 mo     Vitamin D deficiency  Continue current meds     Nephrolithiasis  No recent episodes     Chronic pain of both knees  Improved with steroid injection s as noted     Chronic right shoulder pain  Send for xrays, Tx with meloxicam and refer to Dr Taryn Batistaite Improved see ortho report

## 2021-02-20 ENCOUNTER — IMMUNIZATION (OUTPATIENT)
Dept: FAMILY MEDICINE CLINIC | Age: 76
End: 2021-02-20
Payer: MEDICARE

## 2021-02-20 PROCEDURE — 0001A PR IMM ADMN SARSCOV2 30MCG/0.3ML DIL RECON 1ST DOSE: CPT | Performed by: NURSE PRACTITIONER

## 2021-02-20 PROCEDURE — 91300 COVID-19, PFIZER VACCINE 30MCG/0.3ML DOSE: CPT | Performed by: NURSE PRACTITIONER

## 2021-03-02 ENCOUNTER — TELEPHONE (OUTPATIENT)
Dept: PHARMACY | Facility: CLINIC | Age: 76
End: 2021-03-02

## 2021-03-02 NOTE — TELEPHONE ENCOUNTER
[facility] HEALTH CLINICAL PHARMACY REVIEW: ADHERENCE REVIEW  Identified care gap per Arline; fills at [facility]: Statin adherence    Last Office Visit: 21    Patient found in [facility] and is currently eligible for TIP    ASSESSMENT  STATIN ADHERENCE    Per Insurance Records through [facility]:   ATORVASTATIN TAB 40MG last filled on 21 for a 30 day supply;     Per [facility]:    last picked up on 21 for 30 d/s. [facility] 0 refills remaining. SI tab daily; Billed through [facility]    Lab Results   Component Value Date    CHOL 184 2021    TRIG 127 2021    HDL 52 2021    LDLCALC 107 (H) 2021     ALT   Date Value Ref Range Status   2021 18 10 - 40 U/L Final     AST   Date Value Ref Range Status   2021 17 15 - 37 U/L Final     The 10-year ASCVD risk score (Thurman Boeck., et al., 2013) is: 19.4%    Values used to calculate the score:      Age: 70 years      Sex: Male      Is Non- : No      Diabetic: No      Tobacco smoker: No      Systolic Blood Pressure: 972 mmHg      Is BP treated: No      HDL Cholesterol: 52 mg/dL      Total Cholesterol: 184 mg/dL     PLAN    Called patient and left VM for call back to see if wanting outreach to provider for new prescription for 90 d/s of Atorvastatin since no refills remaining at pharmacy.

## 2021-03-03 ENCOUNTER — OFFICE VISIT (OUTPATIENT)
Dept: ORTHOPEDIC SURGERY | Age: 76
End: 2021-03-03
Payer: MEDICARE

## 2021-03-03 VITALS — TEMPERATURE: 97.2 F | BODY MASS INDEX: 26.2 KG/M2 | HEIGHT: 74 IN | WEIGHT: 204.15 LBS

## 2021-03-03 DIAGNOSIS — M19.011 OSTEOARTHRITIS OF GLENOHUMERAL JOINT, RIGHT: Primary | ICD-10-CM

## 2021-03-03 DIAGNOSIS — M75.81 TENDINITIS OF RIGHT ROTATOR CUFF: ICD-10-CM

## 2021-03-03 DIAGNOSIS — M25.511 ACUTE PAIN OF RIGHT SHOULDER: ICD-10-CM

## 2021-03-03 PROCEDURE — 99213 OFFICE O/P EST LOW 20 MIN: CPT | Performed by: ORTHOPAEDIC SURGERY

## 2021-03-03 PROCEDURE — G8484 FLU IMMUNIZE NO ADMIN: HCPCS | Performed by: ORTHOPAEDIC SURGERY

## 2021-03-03 PROCEDURE — G8427 DOCREV CUR MEDS BY ELIG CLIN: HCPCS | Performed by: ORTHOPAEDIC SURGERY

## 2021-03-03 PROCEDURE — G8417 CALC BMI ABV UP PARAM F/U: HCPCS | Performed by: ORTHOPAEDIC SURGERY

## 2021-03-03 PROCEDURE — 20610 DRAIN/INJ JOINT/BURSA W/O US: CPT | Performed by: ORTHOPAEDIC SURGERY

## 2021-03-03 ASSESSMENT — ENCOUNTER SYMPTOMS
GASTROINTESTINAL NEGATIVE: 1
RESPIRATORY NEGATIVE: 1
EYES NEGATIVE: 1
ALLERGIC/IMMUNOLOGIC NEGATIVE: 1

## 2021-03-03 NOTE — LETTER
Physical Therapy Rehabilitation Referral    Patient Name: Sheree Ornelas      YOB: 1945    Diagnosis:    R cuff tendonitis, primary glenohumeral osteoarthritis  Precautions:   none  Date of Prescription:  3/3/21    [x] Evaluate and Treat    Post Op Instructions:  [] Continuous passive motion (CPM) [] Elbow ROM  [] Exercise in plane of scapula  []  Strengthening     [] Pulley and instruction   [] Home exercise program (copy to patient)   [] Sling when arm at risk  [] Sling or brace at all times   [] AAROM: Forward elevation to             [] AAROM: External rotation  To      [] Isometric external rotator strengthening [] AAROM: internal rotation: up the back  [] Isometric abductor strengthening  [] AAROM: Internal abduction   [] Isometric internal rotator strengthening [] AAROM: cross-body adduction             Stretching:     Strengthening:  [x] Four quadrant (FE, ER, IR, CBA)  [x] Rotator cuff (ER, IR, Abd)  [] Forward Elevation    [] External Rotators     [] External Rotation    [] Internal Rotators  [] Internal Rotation: up/back   [] Abductors     [] Internal Rotation: supine in abduction  [] Sleeper Stretch    [] Flexors  [] Cross-body abduction    [] Extensors  [] Pendulum (FE, Abd/Add, cw/ccw)  [] Scapular Stabilizers   [] Wall-walking (FE, Abd)        [] Shoulder shrugs     [] Table slides (FE)                [] Rhomboid pinch  [] Elbow (flex, ext, pron, sup)        [] Lat.  Pull downs     [] Medial epicondylitis program       [] Forward punch   [] Lateral epicondylitis program       [] Internal rotators     [] Progressive resistive exercises  [] Bench Press        [] Bench press plus  Activities:     [] Lateral pull-downs  [] Rowing     [] Progressive two-hand supine press  [] Stepper/Exercise bike   [] Biceps: curls/supination  [] Swimming  [] Water exercises    Modalities:     Return to Sport:  [x] Of Choice      [] Plyometrics  [] Ultrasound     [] Rhythmic stabilization [] Iontophoresis    [] Core strengthening   [] Moist heat     [] Sports specific program:   [] Massage         [] Cryotherapy      [] Electrical stimulation     [] Paraffin  [] Whirlpool  [] TENS    [x] Home exercise program (copy to patient).         Perform exercises for:  15   minutes    2-3   times/day  [x] Supervised physical therapy  Frequency: []  1x week  [x] 2x week  [] 3x week  [] Other:   Duration: [] 2 weeks   [] 4 weeks  [x] 6 weeks  [] Other:     Additional Instructions:     Asuncion Wellington MD  Orthopaedic Fellow  Mariam Samuels

## 2021-03-03 NOTE — PROGRESS NOTES
[facility] [address] [facility]  Office Visit  New Patient  Date:  3/3/2021    Name:  Cecil Topete  Address:  [address] [place] [address]    :  1945      Age:   70 y.o.    SSN:  [id number]      Medical Record Number:  [id number]    Chief Complaint:     R shoulder pain    HPI:   Cecil Topete is a 70 y.o. male who presents today on referral from Dr. Lori Willoughby for consultation regarding ongoing right shoulder pain over the last several months. He does not recall any associated injury or falls. The pain was particularly bad back in October after playing golf and has steadily improved but he does have some lingering discomfort. He describes some pain with overhead activity and occasionally is having pain at night. He has been taking meloxicam daily which has been helpful as well as using diclofenac topical.  He has not done any formal physical therapy. He has no history of steroid injection in the shoulder. He is an active and likes to golf. He is right-hand dominant. He denies any new numbness, tingling, fevers, chills, chest pain, shortness of breath, or any other new significant symptoms. Pain Assessment  Location of Pain: Shoulder  Location Modifiers: Right    Past History:  Past Medical History:   Diagnosis Date    Cephalgia     Hyperlipidemia     Left knee pain        Past Surgical History:   Procedure Laterality Date    COLONOSCOPY  3/2013    normal Dr Alyssa Mckeon repeat     KNEE CARTILAGE SURGERY  \"S    LEFT KNEE    PAIN MANAGEMENT PROCEDURE Left 2020    LEFT C3, C4, C5 AND C6 MEDIAL BRANCH BLOCKS WITH STEROID WITH FLUOROSCOPY (95207, [id number])  #1 performed by Roscoe Sanches MD at [address]       Social History     Tobacco Use    Smoking status: Never Smoker    Smokeless tobacco: Never Used   Substance Use Topics    Alcohol use:  Yes Neuro: Alert & oriented x 3  Psych: Appropriate mood and affect    R Shoulder Exam:  Inspection: No gross deformities, no signs of infection. Palpation: tender at cuff footprint. Nontender overlying AC joint. Active Range of Motion: Forward Elevation 155, Abduction 155, External Rotation 45, Internal Rotation l1  Passive Range of Motion: No restrictions  Strength:  External Rotation 5/5, Internal Rotation 5/5  Special Tests:  No Maxim muscle deformity. 4+/5 Pérez test. 5/5 Champagne toast test  Neurovascular: Sensation to light touch is intact, no motor deficits, palpable radial pulses 2+      Contralateral L Shoulder Exam:  Inspection: No gross deformities, no signs of infection. Palpation: No tenderness to palpation  Active Range of Motion: Forward Elevation 165, Abduction 165, External Rotation 45, Internal Rotation t12  Passive Range of Motion: No restrictions  Strength:  External Rotation 5/5, Internal Rotation 5/5  Special Tests:  No Maxim muscle deformity. Neurovascular: Sensation to light touch is intact, no motor deficits, palpable radial pulses 2+        Radiographic:  X-rays obtained and reviewed in office, reviewed and interpreted by me today:  Outside radiographs reviewed: Primary glenohumeral osteoarthritis, mild to moderate. No high riding humerus. AC joint arthritis noted. Axial lateral radiograph taken today: No glenoid deformity noted. Assessment:  Morgan Lopez is a 70 y.o. male with right primary glenohumeral osteoarthritis and rotator cuff tendinitis. A trial of conservative treatment is most appropriate. Impression:  Encounter Diagnoses   Name Primary?     Acute pain of right shoulder     Osteoarthritis of glenohumeral joint, right Yes    Tendinitis of right rotator cuff        Office Procedures:  Orders Placed This Encounter   Procedures    XR SHOULDER RIGHT 1 VW     Standing Status:   Future     Number of Occurrences:   1     Standing Expiration Date:   3/3/2022 Order Specific Question:   Reason for exam:     Answer:   axillary lateral    [facility]     Referral Priority:   Routine     Referral Type:   Eval and Treat     Referral Reason:   Specialty Services Required     Requested Specialty:   Physical Therapy     Number of Visits Requested:   1    UT ARTHROCENTESIS ASPIR&/INJ MAJOR JT/BURSA W/O US     80 mg Depomedrol with 8 cc 1% Lidocaine in 10cc syringe with 25G (22G) needle       Plan:   - Pertinent imaging was reviewed including outside radiographs. The etiology, natural history, and treatment options for the disorder were discussed. The roles of activity modifications, medications, injections, physical therapy, and surgical interventions were all described to the patient and questions were answered. -Right shoulder subacromial/glenohumeral injection provided today. -Prescription provided for formal physical therapy. He will be going to [facility].  -He will continue with meloxicam daily. Information provided for Voltaren topical.  -He will return in 4 weeks time for reevaluation      Procedure: right subacromial/glenohumeral  injection  After discussion of the risks, benefits, alternatives to injection, patient agreeable. The skin was cleansed and prepped. Using a 25 gauge needle an injection with 2 mL of 40 mg/ml Depo-Medrol and 8 mL of 1% lidocaine was injected without difficulty into the subacromial space/glenohumeral joint  from a posterior approach. Half the solution was injected into each space. Sterile dressing was applied. The patient tolerated the procedure well. Fern López MD  Orthopaedic Fellow  [facility] and [address] [place] [address]      The encounter with Eduardo Castillo was supervised by Dr Sherly Hoyos who personally examined the patient and reviewed the plan.

## 2021-03-10 ENCOUNTER — HOSPITAL ENCOUNTER (OUTPATIENT)
Dept: PHYSICAL THERAPY | Age: 76
Setting detail: THERAPIES SERIES
Discharge: HOME | End: 2021-03-10
Payer: MEDICARE

## 2021-03-10 PROCEDURE — 97161 PT EVAL LOW COMPLEX 20 MIN: CPT

## 2021-03-10 PROCEDURE — 97110 THERAPEUTIC EXERCISES: CPT

## 2021-03-10 PROCEDURE — 97112 NEUROMUSCULAR REEDUCATION: CPT

## 2021-03-10 NOTE — TELEPHONE ENCOUNTER
CLINICAL PHARMACY CONSULT: MED RECONCILIATION/REVIEW ADDENDUM    For Pharmacy Admin Tracking Only    PHSO: Yes  Total # of Interventions Recommended: 1  - New Order #: 0 New Medication Order Reason(s):  Adherence  Recommended intervention potential cost savings: 0  Total Interventions Accepted: 0  Time Spent (min): [address], [address] [facility]

## 2021-03-10 NOTE — PLAN OF CARE
The [address] [place] [address]  Phone [phone]  Fax [phone]      Physical Therapy Certification    Dear Referring Practitioner: Basim Kumar MD,    We had the pleasure of evaluating the following patient for physical therapy services at [address] [place] [address]. A summary of our findings can be found in the initial assessment below. This includes our plan of care. If you have any questions or concerns regarding these findings, please do not hesitate to contact me at the office phone number checked above. Thank you for the referral.       Physician Signature:_______________________________Date:__________________  By signing above (or electronic signature), therapists plan is approved by physician      Patient: Doroteo Burciaga   : 1945   MRN: [id number]  Referring Physician: Referring Practitioner: Basim Kumar MD      Evaluation Date: 3/10/2021      Medical Diagnosis Information:  Diagnosis: M25.511 (ICD-10-CM) - Acute pain of right shoulder, M19.011 (ICD-10-CM) - Osteoarthritis of glenohumeral joint, right, M75.81 (ICD-10-CM) - Tendinitis of right rotator cuff   Treatment Diagnosis: M75.41 Right shoulder impingement, M25.611 Right shoulder stiffness                                         Insurance information: PT Insurance Information: [facility] Medicare    Precautions/ Contra-indications: N/A    C-SSRS Triggered by Intake questionnaire (Past 2 wk assessment):   [x] No, Questionnaire did not trigger screening.   [] Yes, Patient intake triggered further evaluation      [] C-SSRS Screening completed  [] PCP notified via Plan of Care  [] Emergency services notified     Latex Allergy:  [x]NO      []YES  Preferred Language for Healthcare:   [x]English       []other:    SUBJECTIVE: Patient arrived to physical therapy with c/o R shoulder pain that has been present since 2020 with no known MYRANDA. Originally symptoms were severe enough that he could not lift his arm overhead. Symptoms have steadily improved but continues to have lingering discomfort. Has not attempted physical therapy. D/t symptoms saw referring MD and had x-ray which demonstrated mild to moderate glenohumeral osteoarthritis. Pt received an injection on 3/3/21, was instructed to take Meloxicam daily, use Voltaren gel as needed and attempt PT. No change in pain since having the injection. Reports not really experiencing any pain but experiences crunching in shoulder with movement overhead and behind back and feels limited with movement. 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 Past history of cervical radiculopathy down L UE Fall 2020 and symptoms resolved on their own. Takes medication for high cholesterol. Denies previous shoulder pathology.    Functional Disability Index:UEFI 5% deficit     Pain Scale: 0-1/10  Easing factors: Rest  Provocative factors: Reaching behind back, lifting light weight out/ in front of his body      Type: []Constant   [x]Intermittent  []Radiating [x]Localized []other:     Numbness/Tingling: Denies    Occupation/School: Property management, part time    Living Status/Prior Level of Function: Independent with ADLs and IADLs, golfing    OBJECTIVE:     ROM PROM AROM  Comment    L R L R    Flexion 167 supine   158 supine with end range pain 150 seated 140 seated end range tightness    Abduction  180 supine 175 seated  154 seated, end range discomfort     ER 97 98   Measured supine 90/90   IR 62 46   Measured supine 90/90       Strength L R Comment   Flexion 5 4+    Abduction 4+ 4    ER 4 4    IR 4+ 4    Supraspinatus 3+ mild pain  3+ mild pain Shoulder hike on R compared to L    Biceps 5 5    Triceps 4+ 4+      Special Tests Results/Comment   Deanna Neg L  Pos R for reproduction in symptoms   Neers Neg bilat for reproduction in pain, ROM restriction bilat R>L    Speeds Neg bilat   OBriens Neg bilat   Cross body adduction Neg L  Pos R for reproduction in symptoms   Drop arm sign Neg bilat        Reflexes/Sensation:               [x]Dermatomes/Myotomes intact               []Reflexes equal and normal bilaterally               []Other:     Joint mobility:               []Normal               [x]Hypo R>L especially with IT              []Hyper     Palpation: No tenderness on R      Functional Mobility/Transfers: Excessive shoulder hike and upward rotation on scapula on R with shoulder abduction AROM in sitting. Posture: Mild forward head, rounded shoulders in sitting with R shoulder depressed and protracted compared to L. R scapula abducted compared to L      Bandages/Dressings/Incisions: N/A     Gait: WNL     Orthopedic Special Tests: See above                       [x] Patient history, allergies, meds reviewed. Medical chart reviewed. See intake form. Review Of Systems (ROS):  [x]Performed Review of systems (Integumentary, CardioPulmonary, Neurological) by intake and observation. Intake form has been scanned into medical record. Patient has been instructed to contact their primary care physician regarding ROS issues if not already being addressed at this time.        Co-morbidities/Complexities (which will affect course of rehabilitation):   []None              Arthritic conditions   []Rheumatoid arthritis (M05.9)  []Osteoarthritis (M19.91)    Cardiovascular conditions   []Hypertension (I10)  []Hyperlipidemia (E78.5)  []Angina pectoris (I20)  []Atherosclerosis (I70)    Musculoskeletal conditions   []Disc pathology   []Congenital spine pathologies   []Prior surgical intervention  []Osteoporosis (M81.8)  []Osteopenia (M85.8)   Endocrine conditions   []Hypothyroid (E03.9)  []Hyperthyroid Gastrointestinal conditions   []Constipation (C66.06)    Metabolic conditions   []Morbid obesity (E66.01)  []Diabetes type 1(E10.65) or 2 (E11.65) []Neuropathy (G60.9)      Pulmonary conditions   []Asthma (J45)  []Coughing   []COPD (J44.9)    Psychological Disorders  []Anxiety (F41.9)  []Depression (F32.9)   []Other:    []Other:      High cholesterol      Barriers to/and or personal factors that will affect rehab potential:              [x]Age  []Sex              []Motivation/Lack of Motivation                        []Co-Morbidities              []Cognitive Function, education/learning barriers              []Environmental, home barriers              []profession/work barriers  []past PT/medical experience  []other:  Justification: Pt's age, chronic nature of symptoms and golfing 2-3 times a week are potential barriers to treatment. Falls Risk Assessment (30 days):   [x] Falls Risk assessed and no intervention required.   [] Falls Risk assessed and Patient requires intervention due to being higher risk   TUG score (>12s at risk):     [] Falls education provided, including         ASSESSMENT:   Functional Impairments              []Noted spinal or UE joint hypomobility              []Noted spinal or UE joint hypermobility              [x]Decreased UE functional ROM              [x]Decreased UE functional strength              []Abnormal reflexes/sensation/myotomal/dermatomal deficits              []Decreased RC/scapular/core strength and neuromuscular control              []other:       Functional Activity Limitations (from functional questionnaire and intake)              [x]Reduced ability to tolerate prolonged functional positions              [x]Reduced ability or difficulty with changes of positions or transfers between positions              []Reduced ability to maintain good posture and demonstrate good body mechanics with sitting, bending, and lifting              [] Reduced ability or tolerance with driving and/or computer work              []Reduced ability to sleep              [x]Reduced ability to perform lifting, reaching, carrying tasks []Reduced ability to tolerate impact through UE              [x]Reduced ability to reach behind back              []Reduced ability to  or hold objects              []Reduced ability to throw or toss an object              []other:       Participation Restrictions              []Reduced participation in self care activities              [x]Reduced participation in home management activities              [x]Reduced participation in work activities              [x]Reduced participation in social activities. []Reduced participation in sport / recreational activities. Classification:              []Signs/symptoms consistent with post-surgical status including decreased ROM, strength and function.   []Signs/symptoms consistent with joint sprain/strain              [x]Signs/symptoms consistent with shoulder impingement              []Signs/symptoms consistent with shoulder/elbow/wrist tendinopathy              []Signs/symptoms consistent with Rotator cuff tear              []Signs/symptoms consistent with labral tear              []Signs/symptoms consistent with postural dysfunction                         []Signs/symptoms consistent with Glenohumeral IR Deficit - <45 degrees              []Signs/symptoms consistent with facet dysfunction of cervical/thoracic spine                         []Signs/symptoms consistent with pathology which may benefit from Dry needling                []other:      Prognosis/Rehab Potential:                                       []Excellent              [x]Good                 []Fair              []Poor     Tolerance of evaluation/treatment:               []Excellent              [x]Good                 []Fair              []Poor     Physical Therapy Evaluation Complexity Justification  [x] A history of present problem with:  [x] no personal factors and/or comorbidities that impact the plan of care;  [x]1-2 personal factors and/or comorbidities that impact the plan of care  []3 personal factors and/or comorbidities that impact the plan of care  [x] An examination of body systems using standardized tests and measures addressing any of the following: body structures and functions (impairments), activity limitations, and/or participation restrictions;:  [x] a total of 1-2 or more elements   [] a total of 3 or more elements   [] a total of 4 or more elements   [x] A clinical presentation with:  [x] stable and/or uncomplicated characteristics   [] evolving clinical presentation with changing characteristics  [] unstable and unpredictable characteristics;   [x] Clinical decision making of [x] low, [] moderate, [] high complexity using standardized patient assessment instrument and/or measurable assessment of functional outcome. [x] EVAL (LOW) 82793 (typically 20 minutes face-to-face)  [] EVAL (MOD) 81826 (typically 30 minutes face-to-face)  [] EVAL (HIGH) 36394 (typically 45 minutes face-to-face)  [] RE-EVAL         PLAN:  Frequency/Duration:  2 days per week for 8 Weeks:  INTERVENTIONS:  [x] Therapeutic exercise including: strength training, ROM, for Upper extremity and core   [x]  NMR activation and proprioception for UE, scap and Core   [x] Manual therapy as indicated for shoulder, scapula and spine to include: Dry Needling/IASTM, STM, PROM, Gr I-IV mobilizations, manipulation. [x] Modalities as needed that may include: thermal agents, E-stim, Biofeedback, US, iontophoresis as indicated  [x] Patient education on joint protection, postural re-education, activity modification, progression of HEP. HEP instruction:   Access Code: [email or web address]   URL: [email or web address]   Date: 03/10/2021   Prepared by: Donata Lundborg     Exercises   Supine Shoulder Flexion with Dowel - 10 reps - 1 sets - 10 hold - 2x daily - 7x weekly   Sidelying Thoracic Lumbar Rotation - 10 reps - 1 sets - 10 hold - 2x daily - 7x weekly   Sleeper Stretch - 10 reps - 1 sets - 10 hold - 2x daily - 7x weekly   Standing Shoulder Abduction AAROM with Dowel - 10 reps - 3 sets - 2x daily - 7x weekly   Shoulder External Rotation and Scapular Retraction - 10 reps - 3 sets - 2x daily - 7x weekly        GOALS:   Patient stated goal: Improve functional ROM with reaching behind back   [] Progressing: [] Met: [] Not Met: [] Adjusted    Therapist goals for Patient:   Short Term Goals: To be achieved in: 4 weeks  1. Independent in HEP and progression per patient tolerance, in order to prevent re-injury. [] Progressing: [] Met: [] Not Met: [] Adjusted   2. Patient will have a decrease in pain to facilitate improvement in movement, function, and ADLs as indicated by Functional Deficits. [] Progressing: [] Met: [] Not Met: [] Adjusted    Long Term Goals: To be achieved in: 8 weeks  1. Disability index score of 0% or less for the UEFS to assist with reaching prior level of function. [] Progressing: [] Met: [] Not Met: [] Adjusted  2. Patient will demonstrate increased R shoulder AROM that is equal to L to allow for proper joint functioning as indicated by patients Functional Deficits. [] Progressing: [] Met: [] Not Met: [] Adjusted  3. Patient will demonstrate an increase in R shoulder strength that is equal to L UE to allow for proper functional mobility as indicated by patients Functional Deficits. [] Progressing: [] Met: [] Not Met: [] Adjusted  4. Patient will return to all functional activities without increased symptoms or restriction. [] Progressing: [] Met: [] Not Met: [] Adjusted  5. Patient will have increase ROM in order to reach behind his back with no restrictions compared to L UE and no c/o pain  [] Progressing: [] Met: [] Not Met: [] Adjusted     Electronically signed by:  Alicia Kwong, PT    Note: If patient does not return for scheduled/ recommended follow up visits, this note will serve as a discharge from care along with most recent update on progress.

## 2021-03-10 NOTE — FLOWSHEET NOTE
[address] [facility], [facility] [address], [address] [place] [address]  Phone: [phone]   Fax:     [phone]    Physical Therapy Daily Treatment Note  Date:  3/10/2021    Patient Name:  Navi Torre    :  1945  MRN: [id number]  Restrictions/Precautions:    Medical/Treatment Diagnosis Information:  · Diagnosis: M25.511 (ICD-10-CM) - Acute pain of right shoulder, M19.011 (ICD-10-CM) - Osteoarthritis of glenohumeral joint, right, M75.81 (ICD-10-CM) - Tendinitis of right rotator cuff  · Treatment Diagnosis: M75.41 Right shoulder impingement, M25.611 Right shoulder stiffness  Insurance/Certification information:  PT Insurance Information: [facility] Medicare  Physician Information:  Referring Practitioner: Elida Schwab MD  Has the plan of care been signed (Y/N):        []  Yes  [x]  No     Date of Patient follow up with Physician: 21      Is this a Progress Report:     []  Yes  [x]  No        If Yes:  Date Range for reporting period:  Beginning 3/10/21  Ending    Progress report will be due (10 Rx or 30 days whichever is less):  5/3/75      Recertification will be due (POC Duration  / 90 days whichever is less): 21         Visit # Insurance Allowable Auth Required   [facility] Medicare  BMN []  Yes [x]  No        Functional Scale: UEFI 5% deficit  Date assessed:  3/10/21     Latex Allergy:  [x]NO      []YES  Preferred Language for Healthcare:   [x]English       []other:    Pain level:  0-1/10     SUBJECTIVE:  See eval    OBJECTIVE: See eval   Observation:    Test measurements:      RESTRICTIONS/PRECAUTIONS: N/A    Exercises/Interventions:   Exercises:  Exercise/Equipment Resistance/Repetitions Other comments   Stretching/PROM     Flexion 10\"hx10  3/10 supine cane    SL rainbows 10\"hx10 R 3/10 side lying   Sleeper stretch 10\"hx10 R 3/10 side lying   Abd AAROM 3x10 R 3/10 standing with cane             Isometrics     Retraction      Weight shift     Flexion     Abduction     External Rotation     Internal Rotation     Biceps     Triceps          PRE's     Flexion     Abduction     External Rotation     Internal Rotation     Shrugs     EXT     Reverse Flys     Serratus     Horizontal Abd with ER     Biceps     Triceps     Retraction          Cable Column/Theraband     External Rotation     Internal Rotation     Scap squeeze with ER 3x10 bilat 3/10 completed standing against wall with towel roll    Lats     Ext     Flex     Scapular Retraction     BIC     TRIC     PNF          Dynamic Stability          Plyoback          Manual interventions                     Therapeutic Exercise and NMR EXR  [x] (52573) Provided verbal/tactile cueing for activities related to strengthening, flexibility, endurance, ROM  for improvements in scapular, scapulothoracic and UE control with self care, reaching, carrying, lifting, house/yardwork, driving/computer work.    [] (68063) Provided verbal/tactile cueing for activities related to improving balance, coordination, kinesthetic sense, posture, motor skill, proprioception  to assist with  scapular, scapulothoracic and UE control with self care, reaching, carrying, lifting, house/yardwork, driving/computer work. Therapeutic Activities:    [] (05322 or 07497) Provided verbal/tactile cueing for activities related to improving balance, coordination, kinesthetic sense, posture, motor skill, proprioception and motor activation to allow for proper function of scapular, scapulothoracic and UE control with self care, carrying, lifting, driving/computer work.      Home Exercise Program:    [x] (91240) Reviewed/Progressed HEP activities related to strengthening, flexibility, endurance, ROM of scapular, scapulothoracic and UE control with self care, reaching, carrying, lifting, house/yardwork, driving/computer work  [] (98417) Reviewed/Progressed HEP activities related to improving balance, coordination, kinesthetic sense, posture, motor skill, proprioception of scapular, scapulothoracic and UE control with self care, reaching, carrying, lifting, house/yardwork, driving/computer work      Manual Treatments:  PROM / STM / Oscillations-Mobs:  G-I, II, III, IV (PA's, Inf., Post.)  [] (61460) Provided manual therapy to mobilize soft tissue/joints of cervical/CT, scapular GHJ and UE for the purpose of modulating pain, promoting relaxation,  increasing ROM, reducing/eliminating soft tissue swelling/inflammation/restriction, improving soft tissue extensibility and allowing for proper ROM for normal function with self care, reaching, carrying, lifting, house/yardwork, driving/computer work    Modalities:      Charges:  Timed Code Treatment Minutes: 24'+eval   Total Treatment Minutes: 11:32-12:30  58'       [x] EVAL (LOW) 00028 (typically 20 minutes face-to-face)  [] EVAL (MOD) 41185 (typically 30 minutes face-to-face)  [] EVAL (HIGH) 65605 (typically 45 minutes face-to-face)  [] RE-EVAL     [x] LD(15319) x 1    [] IONTO  [x] NMR (93109) x 1     [] VASO  [] Manual (45677) x      [] Other:  [] TA x      [] Mech Traction (87097)  [] ES(attended) (97690)      [] ES (un) (79092):     GOALS:  Patient stated goal: Improve functional ROM with reaching behind back   []? Progressing: []? Met: []? Not Met: []? Adjusted     Therapist goals for Patient:   Short Term Goals: To be achieved in: 4 weeks  1. Independent in HEP and progression per patient tolerance, in order to prevent re-injury. []? Progressing: []? Met: []? Not Met: []? Adjusted   2. Patient will have a decrease in pain to facilitate improvement in movement, function, and ADLs as indicated by Functional Deficits. []? Progressing: []? Met: []? Not Met: []? Adjusted     Long Term Goals: To be achieved in: 8 weeks  1. Disability index score of 0% or less for the UEFS to assist with reaching prior level of function. []? Progressing: []? Met: []? Not Met: []? Adjusted  2.  Patient will demonstrate increased R shoulder AROM that is equal to L to allow for proper joint functioning as indicated by patients Functional Deficits. []? Progressing: []? Met: []? Not Met: []? Adjusted  3. Patient will demonstrate an increase in R shoulder strength that is equal to L UE to allow for proper functional mobility as indicated by patients Functional Deficits. []? Progressing: []? Met: []? Not Met: []? Adjusted  4. Patient will return to all functional activities without increased symptoms or restriction. []? Progressing: []? Met: []? Not Met: []? Adjusted  5. Patient will have increase ROM in order to reach behind his back with no restrictions compared to L UE and no c/o pain  []? Progressing: []? Met: []? Not Met: []? Adjusted    Overall Progression Towards Functional goals/ Treatment Progress Update:  [] Patient is progressing as expected towards functional goals listed. [] Progression is slowed due to complexities/Impairments listed. [] Progression has been slowed due to co-morbidities. [x] Plan just implemented, too soon to assess goals progression <30days   [] Goals require adjustment due to lack of progress  [] Patient is not progressing as expected and requires additional follow up with physician  [] Other    Prognosis for POC: [x] Good [] Fair  [] Poor      Patient requires continued skilled intervention: [x] Yes  [] No    Treatment/Activity Tolerance:  [x] Patient able to complete treatment  [] Patient limited by fatigue  [] Patient limited by pain     [] Patient limited by other medical complications  [] Other:   3/10 Responded well to exercises with reported decrease in tightness at end of treatment                  Patient Education:           3/10 Educated on proper posture in sitting, importance of HEP and use of ice for symptom control    HEP instruction:   Access Code: [email or web address]   URL: [email or web address]   Date: 03/10/2021   Prepared by: Wardell Dancer     Exercises   · Supine Shoulder Flexion with Dowel - 10 reps - 1 sets - 10 hold - 2x daily - 7x weekly   · Sidelying Thoracic Lumbar Rotation - 10 reps - 1 sets - 10 hold - 2x daily - 7x weekly   · Sleeper Stretch - 10 reps - 1 sets - 10 hold - 2x daily - 7x weekly   · Standing Shoulder Abduction AAROM with Dowel - 10 reps - 3 sets - 2x daily - 7x weekly   Shoulder External Rotation and Scapular Retraction - 10 reps - 3 sets - 2x daily - 7x weekly          PLAN: See eval  [] Continue per plan of care [] Alter current plan (see comments above)  [x] Plan of care initiated [] Hold pending MD visit [] Discharge      Electronically signed by:  Monica Merino PT    Note: If patient does not return for scheduled/ recommended follow up visits, this note will serve as a discharge from care along with most recent update on progress.

## 2021-03-10 NOTE — TELEPHONE ENCOUNTER
Spoke to patient who stated that he was no longer using [facility] to get Atorvastatin medication. Stated he was using [facility] and got a 90 d/s in February when it was due. Stated he had 60 d/s left of medication. No further action needed.

## 2021-03-13 ENCOUNTER — IMMUNIZATION (OUTPATIENT)
Dept: FAMILY MEDICINE CLINIC | Age: 76
End: 2021-03-13
Payer: MEDICARE

## 2021-03-13 PROCEDURE — 0002A PR IMM ADMN SARSCOV2 30MCG/0.3ML DIL RECON 2ND DOSE: CPT | Performed by: NURSE PRACTITIONER

## 2021-03-13 PROCEDURE — 91300 COVID-19, PFIZER VACCINE 30MCG/0.3ML DOSE: CPT | Performed by: NURSE PRACTITIONER

## 2021-03-18 ENCOUNTER — HOSPITAL ENCOUNTER (OUTPATIENT)
Dept: PHYSICAL THERAPY | Age: 76
Setting detail: THERAPIES SERIES
Discharge: HOME | End: 2021-03-18
Payer: MEDICARE

## 2021-03-18 PROCEDURE — 97110 THERAPEUTIC EXERCISES: CPT

## 2021-03-18 PROCEDURE — 97140 MANUAL THERAPY 1/> REGIONS: CPT

## 2021-03-18 PROCEDURE — 97112 NEUROMUSCULAR REEDUCATION: CPT

## 2021-03-18 NOTE — FLOWSHEET NOTE
The [facility] and 500 [facility], [address] [place] [address] 3360 [facility], [address] [place] [address]  Phone: [phone]   Fax:     [phone]    Physical Therapy Daily Treatment Note  Date:  3/18/2021    Patient Name:  Eduardo Castillo    :  1945  MRN: [id number]  Restrictions/Precautions:    Medical/Treatment Diagnosis Information:  · Diagnosis: M25.511 (ICD-10-CM) - Acute pain of right shoulder, M19.011 (ICD-10-CM) - Osteoarthritis of glenohumeral joint, right, M75.81 (ICD-10-CM) - Tendinitis of right rotator cuff  · Treatment Diagnosis: M75.41 Right shoulder impingement, M25.611 Right shoulder stiffness  Insurance/Certification information:  PT Insurance Information: [place] [employer]  Physician Information:  Referring Practitioner: Trupti Serna MD  Has the plan of care been signed (Y/N):        [x]  Yes  []  No     Date of Patient follow up with Physician: 21      Is this a Progress Report:     []  Yes  [x]  No        If Yes:  Date Range for reporting period:  Beginning 3/10/21  Ending    Progress report will be due (10 Rx or 30 days whichever is less):  89      Recertification will be due (POC Duration  / 90 days whichever is less): 21         Visit # Insurance Allowable Auth Required   1 [facility] Medicare  BMN []  Yes [x]  No        Functional Scale: UEFI 5% deficit  Date assessed:  3/10/21     Latex Allergy:  [x]NO      []YES  Preferred Language for Healthcare:   [x]English       []other:    Pain level:  0/10  3/18      SUBJECTIVE:  3/18 Pt feels he has gained a little more strength and movements. Continues to notice sounds with R shoulder abduction ROM. Golfed twice since last visit with no c/o pain.      OBJECTIVE: See eval   Observation:    Test measurements:  ROM PROM AROM  Comment     L R 3/18  L R 3/18     Flexion 167 supine   170 supine  150 seated 150 standing     Abduction   180 supine 175 seated  160 standing     ER 97 98     Measured supine 90/90 IR 62 52     Measured supine            RESTRICTIONS/PRECAUTIONS: N/A    Exercises/Interventions:   Exercises:  Exercise/Equipment Resistance/Repetitions Other comments   Stretching/PROM     Flexion  3/18 progressed with wall slides    Wall slides 10\"hx10 R Start 3/18   SL rainbows  3/18 cont with HEP, attempted pec stretch during treatment   Pec stretch 10\"hx10 bilat 3/18 standing door way 90/90, showed how to complete at 45 deg d/t increase posterior shoulder discomfort at 90   Towel IR 10\"hx10 R 3/18 cues for proper form    Sleeper stretch 20\"hx5 R ^3/18 side lying   Abd AAROM 3x10 R 3/10 standing with cane             Isometrics     Retraction          Weight shift     Flexion     Abduction     External Rotation     Internal Rotation     Biceps     Triceps          PRE's     Flexion 1# 3x10 bilat Start 3/18 standing to 90 deg in scapular plane   Abduction     External Rotation 1# 3x10 R Start 3/18 side lying   Internal Rotation     Shrugs     EXT     Prone HABD 0# 3x10 R Start 3/18 palm down   Serratus     Biceps     Triceps     Retraction          Cable Column/Theraband     External Rotation     Internal Rotation     Scap squeeze with ER 3x10 bilat 3/10 completed standing against wall with towel roll    Lats     Ext     Flex     Scapular Retraction     BIC     TRIC     PNF          Dynamic Stability          Plyoback          Manual interventions     Joint mobilization Grade 3 mobilization A-P and Sup-inf x4' 3/18 supine 90 deg abd   PROM R shoulder flexion and IR x6' 3/18 IR completed @ 90 deg abd         Therapeutic Exercise and NMR EXR  [x] (90229) Provided verbal/tactile cueing for activities related to strengthening, flexibility, endurance, ROM  for improvements in scapular, scapulothoracic and UE control with self care, reaching, carrying, lifting, house/yardwork, driving/computer work.    [] (99102) Provided verbal/tactile cueing for activities related to improving balance, coordination, kinesthetic sense, posture, motor skill, proprioception  to assist with  scapular, scapulothoracic and UE control with self care, reaching, carrying, lifting, house/yardwork, driving/computer work. Therapeutic Activities:    [] (71778 or 66142) Provided verbal/tactile cueing for activities related to improving balance, coordination, kinesthetic sense, posture, motor skill, proprioception and motor activation to allow for proper function of scapular, scapulothoracic and UE control with self care, carrying, lifting, driving/computer work.      Home Exercise Program:    [x] (34496) Reviewed/Progressed HEP activities related to strengthening, flexibility, endurance, ROM of scapular, scapulothoracic and UE control with self care, reaching, carrying, lifting, house/yardwork, driving/computer work  [] (59366) Reviewed/Progressed HEP activities related to improving balance, coordination, kinesthetic sense, posture, motor skill, proprioception of scapular, scapulothoracic and UE control with self care, reaching, carrying, lifting, house/yardwork, driving/computer work      Manual Treatments:  PROM / STM / Oscillations-Mobs:  G-I, II, III, IV (PA's, Inf., Post.)  [] (15176) Provided manual therapy to mobilize soft tissue/joints of cervical/CT, scapular GHJ and UE for the purpose of modulating pain, promoting relaxation,  increasing ROM, reducing/eliminating soft tissue swelling/inflammation/restriction, improving soft tissue extensibility and allowing for proper ROM for normal function with self care, reaching, carrying, lifting, house/yardwork, driving/computer work    Modalities:      Charges:  Timed Code Treatment Minutes: 42'   Total Treatment Minutes: 8:40-9:27  52'       [] EVAL (LOW) 93598 (typically 20 minutes face-to-face)  [] EVAL (MOD) 74473 (typically 30 minutes face-to-face)  [] EVAL (HIGH) 49450 (typically 45 minutes face-to-face)  [] RE-EVAL     [x] TS(29743) x 1    [] IONTO  [x] NMR (48770) x 1     [] VASO  [x] Manual (26901) x1      [] Other:  [] TA x      [] Mech Traction (64768)  [] ES(attended) (08952)      [] ES (un) (64042):     GOALS:  Patient stated goal: Improve functional ROM with reaching behind back   []? Progressing: []? Met: []? Not Met: []? Adjusted     Therapist goals for Patient:   Short Term Goals: To be achieved in: 4 weeks  1. Independent in HEP and progression per patient tolerance, in order to prevent re-injury. []? Progressing: []? Met: []? Not Met: []? Adjusted   2. Patient will have a decrease in pain to facilitate improvement in movement, function, and ADLs as indicated by Functional Deficits. []? Progressing: []? Met: []? Not Met: []? Adjusted     Long Term Goals: To be achieved in: 8 weeks  1. Disability index score of 0% or less for the UEFS to assist with reaching prior level of function. []? Progressing: []? Met: []? Not Met: []? Adjusted  2. Patient will demonstrate increased R shoulder AROM that is equal to L to allow for proper joint functioning as indicated by patients Functional Deficits. []? Progressing: []? Met: []? Not Met: []? Adjusted  3. Patient will demonstrate an increase in R shoulder strength that is equal to L UE to allow for proper functional mobility as indicated by patients Functional Deficits. []? Progressing: []? Met: []? Not Met: []? Adjusted  4. Patient will return to all functional activities without increased symptoms or restriction. []? Progressing: []? Met: []? Not Met: []? Adjusted  5. Patient will have increase ROM in order to reach behind his back with no restrictions compared to L UE and no c/o pain  []? Progressing: []? Met: []? Not Met: []? Adjusted    Overall Progression Towards Functional goals/ Treatment Progress Update:  [] Patient is progressing as expected towards functional goals listed. [] Progression is slowed due to complexities/Impairments listed. [] Progression has been slowed due to co-morbidities.   [x] Plan just

## 2021-03-26 ENCOUNTER — APPOINTMENT (OUTPATIENT)
Dept: PHYSICAL THERAPY | Age: 76
End: 2021-03-26
Payer: MEDICARE

## 2021-03-29 ENCOUNTER — TELEPHONE (OUTPATIENT)
Dept: INTERNAL MEDICINE CLINIC | Age: 76
End: 2021-03-29

## 2021-03-29 RX ORDER — MELOXICAM 7.5 MG/1
TABLET ORAL
Qty: 60 TABLET | Refills: 2 | Status: SHIPPED | OUTPATIENT
Start: 2021-03-29 | End: 2021-06-02 | Stop reason: SDUPTHER

## 2021-03-29 NOTE — TELEPHONE ENCOUNTER
meloxicam (MOBIC) 7.5 MG tablet [[id number]- in need of a refill and stated he will be leaving to go out of town on 4.1 and requesting this be done before hand pls advise- he also state him and his wife did get there shots and thank you for the help with that. ..          [facility] Karyn Lopez, [address] [facility] [address]

## 2021-03-31 ENCOUNTER — TELEPHONE (OUTPATIENT)
Dept: INTERNAL MEDICINE CLINIC | Age: 76
End: 2021-03-31

## 2021-03-31 RX ORDER — ATORVASTATIN CALCIUM 40 MG/1
TABLET, FILM COATED ORAL
Qty: 30 TABLET | Refills: 2 | Status: SHIPPED | OUTPATIENT
Start: 2021-03-31 | End: 2021-06-01 | Stop reason: SDUPTHER

## 2021-03-31 NOTE — TELEPHONE ENCOUNTER
Medication Refill :     atorvastatin (LIPITOR) 40 MG tablet [[id number]    Patient is leaving to go out of town on 04/01/21, so he is going to need to pick this up today. Please call to advise.     [facility] Kelsi Adame, [address] [facility] [address]

## 2021-04-20 ENCOUNTER — TELEPHONE (OUTPATIENT)
Dept: PHARMACY | Facility: CLINIC | Age: 76
End: 2021-04-20

## 2021-04-20 NOTE — TELEPHONE ENCOUNTER
CLINICAL PHARMACY CONSULT: MED RECONCILIATION/REVIEW ADDENDUM    For Pharmacy Admin Tracking Only    PHSO: Yes  Total # of Interventions Recommended: 0  Recommended intervention potential cost savings: 1  Total Interventions Accepted: 0  Time Spent (min): 1438 Gaudencio NICE, 69[address] [facility]

## 2021-04-29 ENCOUNTER — TELEPHONE (OUTPATIENT)
Dept: INTERNAL MEDICINE CLINIC | Age: 76
End: 2021-04-29

## 2021-04-29 NOTE — TELEPHONE ENCOUNTER
[facility]   [phone]    [facility]    [phone]    Left message on machine for patient with the above information.

## 2021-04-29 NOTE — TELEPHONE ENCOUNTER
Patient called wanting to know it you could refer to a dermatologist?  He would like to see one for routine spots on the skin.      Please call to advise

## 2021-05-11 NOTE — TELEPHONE ENCOUNTER
Called pharmacy who stated that Atorvastatin was last picked 4/1/21 and was awaiting . Called patient back and left VM advising him of this. Also left call back number to ask if prescription request could be sent to provider for 90 d/s of Atorvastatin and also be sent to [facility] pharmacy.

## 2021-05-14 NOTE — TELEPHONE ENCOUNTER
Called pharmacy and patient has still not picked up Atorvastatin. Called patient and left VM letting him know it was ready for . Sending letter.

## 2021-05-28 ENCOUNTER — TELEPHONE (OUTPATIENT)
Dept: INTERNAL MEDICINE CLINIC | Age: 76
End: 2021-05-28

## 2021-05-28 NOTE — TELEPHONE ENCOUNTER
Patient called in stating that he will now be using     [address], [address] [place]my [place], [address] [facility],[address] - [address] [phone]      Pls advise.

## 2021-06-01 RX ORDER — ATORVASTATIN CALCIUM 40 MG/1
TABLET, FILM COATED ORAL
Qty: 90 TABLET | Refills: 1 | Status: SHIPPED | OUTPATIENT
Start: 2021-06-01 | End: 2021-11-03 | Stop reason: SDUPTHER

## 2021-06-02 RX ORDER — MELOXICAM 7.5 MG/1
TABLET ORAL
Qty: 90 TABLET | Refills: 1 | Status: SHIPPED | OUTPATIENT
Start: 2021-06-02 | End: 2021-10-12 | Stop reason: SDUPTHER

## 2021-06-03 ENCOUNTER — TELEPHONE (OUTPATIENT)
Dept: INTERNAL MEDICINE CLINIC | Age: 76
End: 2021-06-03

## 2021-06-06 NOTE — TELEPHONE ENCOUNTER
Chief Complaint   Patient presents with     Ear Pain     LT ear - tugging, drainage and tugging x 1 day - cough     Follow-up     Forskin check       HPI: Patient presents today with his father for 2 issues.  First recheck foreskin.  Patient's father did not follow-up on the urology referral that I placed.  He did use the topical antibiotic which he feels improved the irritation at the tip of his penis.    Patient is tugging at the left ear and has had otorrhea for the past 24 hours in the left ear.  Right ear is been normal.    ROS: No fever vomiting or diarrhea.  Positive otorrhea.  No difficulty urinating.    SH: Reviewed-see Snapshot for review.     FH: Reviewed-see Snapshot for review.    Meds:  Nnamdi has a current medication list which includes the following prescription(s): bacitracin, azithromycin, and neomycin-polymyxin-hydrocortisone.    O:  Pulse 100   Temp 98.4  F (36.9  C) (Axillary)   Resp 19   Wt 28 lb 9 oz (13 kg)   SpO2 98%   Constitutional:    --Vitals as above  --No acute distress  Eyes-  --Sclera noninjected  --Lids and conjunctiva normal  ENT-  --TMs show left ear has inflamed external canal.  Right ear has inflamed tympanic membrane.  --Sclera noninjected  --Pharynx not erythematous  Neck-  --Neck supple    Lymph-  --No cervical lymphadenopathy  Lungs-  --Clear to Auscultation  Heart-  --Regular rate and rhythm  Skin-  --Pink and dry  You-  --Penis appears to be normal with no evidence of swelling or infection    A/P:   1. Acute otitis media, unspecified otitis media type  - azithromycin (ZITHROMAX) 100 mg/5 mL suspension; 1.5 tsp today then 3/4 tsp daily x 4D  Dispense: 25 mL; Refill: 0    2. Otitis externa of left ear, unspecified chronicity, unspecified type  - neomycin-polymyxin-hydrocortisone (CORTISPORIN) otic solution; Administer 3 drops into both ears 3 (three) times a day for 10 days.  Dispense: 10 mL; Refill: 0    3.  Phimosis   -Encouraged him to follow-up with urology.   Rx sent, pt informed.

## 2021-06-11 ENCOUNTER — TELEPHONE (OUTPATIENT)
Dept: PHARMACY | Facility: CLINIC | Age: 76
End: 2021-06-11

## 2021-06-11 NOTE — TELEPHONE ENCOUNTER
[facility] REVIEW: ADHERENCE REVIEW  Identified care gap per [place]; fills at [facility]: Statin adherence    Last Visit: 02/17/2021    Patient also appears to be prescribed: n/a    ASSESSMENT  STATIN ADHERENCE    Per Insurance Records through 06/04/2021 (YTD [place] = 57%; Potential Fail Date: 06/17/2021): Atorvastatin 40mg daily last filled on 05/11/2021 for 30 day supply. Next refill due: 06/10/2021    Per Reconciled Dispense Report: same as above    Per [address] [facility] [address]   atorvastatin last filled on 05/11/2021 for 30 day supply. 1 refills remaining. Billed through [place]     Per [facility]:   Atorvastatin Rx on file (90ds x 2 refills) - has not been filled recently    Lab Results   Component Value Date    CHOL 184 01/04/2021    TRIG 127 01/04/2021    HDL 52 01/04/2021    LDLCALC 107 (H) 01/04/2021     ALT   Date Value Ref Range Status   01/04/2021 18 10 - 40 U/L Final     AST   Date Value Ref Range Status   01/04/2021 17 15 - 37 U/L Final     The ASCVD Risk score (Nelida Doe et al., 2013) failed to calculate for the following reasons: The systolic blood pressure is missing     PLAN  The following are interventions that have been identified:   - Patient overdue refilling atorvastatin and active on home medication list.   - Patient eligible for 90 day supply of atorvastatin  - Inquire if patient wants to use [facility]    If patient would like to start getting atorvastatin at mail order, he needs to call the pharmacy to request fill at [phone]. Attempting to reach patient to review the above. Left message asking for return call.      Future Appointments   Date Time Provider Brittany Aguilar   8/2/2021  8:45 AM Brett Akbar MD [address], PharmD, [place]iz  Direct: [phone]  Department, toll free: [phone], option 7

## 2021-06-14 NOTE — TELEPHONE ENCOUNTER
[facility] REVIEW: ADHERENCE REVIEW    Second attempt to reach patient to review medication adherence. Left message asking for return call.     Carlos Griffin, PharmD, [facility], [address]  Direct: [phone]  Department, toll free: [phone], option 7     =======================================================    For Pharmacy Admin Tracking Only   Recommendation Provided To: Pharmacy: 1   Intervention Detail: Adherence Monitorin   Gap Closed?: No    Total # of Interventions Recommended: 1   Total # of Interventions Accepted: 0   Intervention Accepted By: Pharmacy: 0   Time Spent (min): 15

## 2021-07-01 ENCOUNTER — TELEPHONE (OUTPATIENT)
Dept: INTERNAL MEDICINE CLINIC | Age: 76
End: 2021-07-01

## 2021-07-01 NOTE — TELEPHONE ENCOUNTER
Patient called to have medication sent over to the pharmacy for a refill atorvastatin (LIPITOR) 40 MG tablet. [address], [address]      Please refill medication for 90 day refill. Please advise. Confirmed phone number with patient.

## 2021-07-01 NOTE — TELEPHONE ENCOUNTER
Spoke with Noemi at [employer] and script was received, not sure why is was not sent processed. Will send out today. Patient aware.

## 2021-08-03 ENCOUNTER — TELEPHONE (OUTPATIENT)
Dept: INTERNAL MEDICINE CLINIC | Age: 76
End: 2021-08-03

## 2021-08-03 DIAGNOSIS — E78.2 MIXED HYPERLIPIDEMIA: Primary | ICD-10-CM

## 2021-08-03 NOTE — TELEPHONE ENCOUNTER
----- Message from Federicoprudencio Joel sent at 8/3/2021  9:41 AM EDT -----  Subject: Message to Provider    QUESTIONS  Information for Provider? Pt missed appt on 8/3 and is wanting to get   blood work done for his new appt on 8/23.   ---------------------------------------------------------------------------  --------------  [address]  What is the best way for the office to contact you? OK to leave message on   voicemail  Preferred Call Back Phone Number? [phone]  ---------------------------------------------------------------------------  --------------  SCRIPT ANSWERS  Relationship to Patient?  Self

## 2021-08-04 DIAGNOSIS — E78.2 MIXED HYPERLIPIDEMIA: ICD-10-CM

## 2021-08-04 LAB
A/G RATIO: 1.9 (ref 1.1–2.2)
ALBUMIN SERPL-MCNC: 4.6 G/DL (ref 3.4–5)
ALP BLD-CCNC: 68 U/L (ref 40–129)
ALT SERPL-CCNC: 19 U/L (ref 10–40)
ANION GAP SERPL CALCULATED.3IONS-SCNC: 10 MMOL/L (ref 3–16)
AST SERPL-CCNC: 20 U/L (ref 15–37)
BASOPHILS ABSOLUTE: 0.1 K/UL (ref 0–0.2)
BASOPHILS RELATIVE PERCENT: 0.9 %
BILIRUB SERPL-MCNC: 0.8 MG/DL (ref 0–1)
BUN BLDV-MCNC: 19 MG/DL (ref 7–20)
CALCIUM SERPL-MCNC: 9.5 MG/DL (ref 8.3–10.6)
CHLORIDE BLD-SCNC: 101 MMOL/L (ref 99–110)
CHOLESTEROL, TOTAL: 203 MG/DL (ref 0–199)
CO2: 28 MMOL/L (ref 21–32)
CREAT SERPL-MCNC: 1.2 MG/DL (ref 0.8–1.3)
EOSINOPHILS ABSOLUTE: 0.3 K/UL (ref 0–0.6)
EOSINOPHILS RELATIVE PERCENT: 5.2 %
GFR AFRICAN AMERICAN: >60
GFR NON-AFRICAN AMERICAN: 59
GLOBULIN: 2.4 G/DL
GLUCOSE BLD-MCNC: 92 MG/DL (ref 70–99)
HCT VFR BLD CALC: 45.6 % (ref 40.5–52.5)
HDLC SERPL-MCNC: 50 MG/DL (ref 40–60)
HEMOGLOBIN: 15.5 G/DL (ref 13.5–17.5)
LDL CHOLESTEROL CALCULATED: 133 MG/DL
LYMPHOCYTES ABSOLUTE: 1.8 K/UL (ref 1–5.1)
LYMPHOCYTES RELATIVE PERCENT: 31.6 %
MCH RBC QN AUTO: 30.3 PG (ref 26–34)
MCHC RBC AUTO-ENTMCNC: 33.9 G/DL (ref 31–36)
MCV RBC AUTO: 89.2 FL (ref 80–100)
MONOCYTES ABSOLUTE: 0.6 K/UL (ref 0–1.3)
MONOCYTES RELATIVE PERCENT: 10.1 %
NEUTROPHILS ABSOLUTE: 2.9 K/UL (ref 1.7–7.7)
NEUTROPHILS RELATIVE PERCENT: 52.2 %
PDW BLD-RTO: 13.6 % (ref 12.4–15.4)
PLATELET # BLD: 202 K/UL (ref 135–450)
PMV BLD AUTO: 9.3 FL (ref 5–10.5)
POTASSIUM SERPL-SCNC: 4.8 MMOL/L (ref 3.5–5.1)
RBC # BLD: 5.11 M/UL (ref 4.2–5.9)
SODIUM BLD-SCNC: 139 MMOL/L (ref 136–145)
TOTAL PROTEIN: 7 G/DL (ref 6.4–8.2)
TRIGL SERPL-MCNC: 102 MG/DL (ref 0–150)
TSH REFLEX: 2.75 UIU/ML (ref 0.27–4.2)
VLDLC SERPL CALC-MCNC: 20 MG/DL
WBC # BLD: 5.6 K/UL (ref 4–11)

## 2021-09-08 ENCOUNTER — OFFICE VISIT (OUTPATIENT)
Dept: INTERNAL MEDICINE CLINIC | Age: 76
End: 2021-09-08
Payer: MEDICARE

## 2021-09-08 VITALS
SYSTOLIC BLOOD PRESSURE: 124 MMHG | BODY MASS INDEX: 25.44 KG/M2 | RESPIRATION RATE: 12 BRPM | WEIGHT: 198.2 LBS | HEART RATE: 66 BPM | DIASTOLIC BLOOD PRESSURE: 74 MMHG | HEIGHT: 74 IN

## 2021-09-08 DIAGNOSIS — M25.561 CHRONIC PAIN OF BOTH KNEES: ICD-10-CM

## 2021-09-08 DIAGNOSIS — Z00.00 ROUTINE GENERAL MEDICAL EXAMINATION AT A HEALTH CARE FACILITY: ICD-10-CM

## 2021-09-08 DIAGNOSIS — M25.511 CHRONIC RIGHT SHOULDER PAIN: ICD-10-CM

## 2021-09-08 DIAGNOSIS — G89.29 CHRONIC RIGHT SHOULDER PAIN: ICD-10-CM

## 2021-09-08 DIAGNOSIS — M25.562 CHRONIC PAIN OF BOTH KNEES: ICD-10-CM

## 2021-09-08 DIAGNOSIS — G56.22 ULNAR NERVE ENTRAPMENT AT ELBOW, LEFT: ICD-10-CM

## 2021-09-08 DIAGNOSIS — N20.0 NEPHROLITHIASIS: ICD-10-CM

## 2021-09-08 DIAGNOSIS — G89.29 CHRONIC PAIN OF BOTH KNEES: ICD-10-CM

## 2021-09-08 DIAGNOSIS — E78.2 MIXED HYPERLIPIDEMIA: ICD-10-CM

## 2021-09-08 DIAGNOSIS — Z00.00 WELL ADULT EXAM: Primary | ICD-10-CM

## 2021-09-08 DIAGNOSIS — N52.8 OTHER MALE ERECTILE DYSFUNCTION: ICD-10-CM

## 2021-09-08 DIAGNOSIS — E55.9 VITAMIN D DEFICIENCY: ICD-10-CM

## 2021-09-08 DIAGNOSIS — Z23 NEED FOR INFLUENZA VACCINATION: ICD-10-CM

## 2021-09-08 DIAGNOSIS — R53.82 CHRONIC FATIGUE: ICD-10-CM

## 2021-09-08 PROCEDURE — 90694 VACC AIIV4 NO PRSRV 0.5ML IM: CPT | Performed by: INTERNAL MEDICINE

## 2021-09-08 PROCEDURE — G8427 DOCREV CUR MEDS BY ELIG CLIN: HCPCS | Performed by: INTERNAL MEDICINE

## 2021-09-08 PROCEDURE — 1123F ACP DISCUSS/DSCN MKR DOCD: CPT | Performed by: INTERNAL MEDICINE

## 2021-09-08 PROCEDURE — 1036F TOBACCO NON-USER: CPT | Performed by: INTERNAL MEDICINE

## 2021-09-08 PROCEDURE — 99214 OFFICE O/P EST MOD 30 MIN: CPT | Performed by: INTERNAL MEDICINE

## 2021-09-08 PROCEDURE — G0008 ADMIN INFLUENZA VIRUS VAC: HCPCS | Performed by: INTERNAL MEDICINE

## 2021-09-08 PROCEDURE — G8417 CALC BMI ABV UP PARAM F/U: HCPCS | Performed by: INTERNAL MEDICINE

## 2021-09-08 PROCEDURE — 4040F PNEUMOC VAC/ADMIN/RCVD: CPT | Performed by: INTERNAL MEDICINE

## 2021-09-08 PROCEDURE — 3017F COLORECTAL CA SCREEN DOC REV: CPT | Performed by: INTERNAL MEDICINE

## 2021-09-08 PROCEDURE — G0439 PPPS, SUBSEQ VISIT: HCPCS | Performed by: INTERNAL MEDICINE

## 2021-09-08 RX ORDER — SILDENAFIL 100 MG/1
100 TABLET, FILM COATED ORAL PRN
Qty: 20 TABLET | Refills: 2 | Status: SHIPPED | OUTPATIENT
Start: 2021-09-08

## 2021-09-08 SDOH — ECONOMIC STABILITY: FOOD INSECURITY: WITHIN THE PAST 12 MONTHS, THE FOOD YOU BOUGHT JUST DIDN'T LAST AND YOU DIDN'T HAVE MONEY TO GET MORE.: NEVER TRUE

## 2021-09-08 SDOH — ECONOMIC STABILITY: FOOD INSECURITY: WITHIN THE PAST 12 MONTHS, YOU WORRIED THAT YOUR FOOD WOULD RUN OUT BEFORE YOU GOT MONEY TO BUY MORE.: NEVER TRUE

## 2021-09-08 ASSESSMENT — ENCOUNTER SYMPTOMS
GASTROINTESTINAL NEGATIVE: 1
EYES NEGATIVE: 1
ALLERGIC/IMMUNOLOGIC NEGATIVE: 1
RESPIRATORY NEGATIVE: 1

## 2021-09-08 ASSESSMENT — PATIENT HEALTH QUESTIONNAIRE - PHQ9
SUM OF ALL RESPONSES TO PHQ QUESTIONS 1-9: 0
SUM OF ALL RESPONSES TO PHQ9 QUESTIONS 1 & 2: 0
1. LITTLE INTEREST OR PLEASURE IN DOING THINGS: 0
SUM OF ALL RESPONSES TO PHQ QUESTIONS 1-9: 0
2. FEELING DOWN, DEPRESSED OR HOPELESS: 0
SUM OF ALL RESPONSES TO PHQ QUESTIONS 1-9: 0

## 2021-09-08 ASSESSMENT — LIFESTYLE VARIABLES
HOW MANY STANDARD DRINKS CONTAINING ALCOHOL DO YOU HAVE ON A TYPICAL DAY: 0
HOW OFTEN DO YOU HAVE SIX OR MORE DRINKS ON ONE OCCASION: 0
HOW OFTEN DO YOU HAVE A DRINK CONTAINING ALCOHOL: 2
AUDIT-C TOTAL SCORE: 2

## 2021-09-08 ASSESSMENT — SOCIAL DETERMINANTS OF HEALTH (SDOH): HOW HARD IS IT FOR YOU TO PAY FOR THE VERY BASICS LIKE FOOD, HOUSING, MEDICAL CARE, AND HEATING?: NOT HARD AT ALL

## 2021-09-08 NOTE — PROGRESS NOTES
Subjective:      Patient ID: Geraldo Bird is a 70 y.o. male. HPI  Here today for medicare complete physical and review of chronic problems as listed under assessment and plan,no new c/o feels good     Hyperlipidemia  This is a chronic problem. The current episode started more than 1 year ago. The problem is controlled. Recent lipid tests were reviewed and are normal. There are no known factors aggravating his hyperlipidemia. Pertinent negatives include no myalgias. Current antihyperlipidemic treatment includes diet change, exercise and statins. The current treatment provides significant improvement of lipids. There are no compliance problems. Risk factors for coronary artery disease include dyslipidemia and male sex. No Known Allergies    Current Outpatient Medications   Medication Sig Dispense Refill    sildenafil (VIAGRA) 100 MG tablet Take 1 tablet by mouth as needed for Erectile Dysfunction 20 tablet 2    meloxicam (MOBIC) 7.5 MG tablet TAKE 1 TABLET BY MOUTH ONE TIME A DAY 90 tablet 1    atorvastatin (LIPITOR) 40 MG tablet TAKE 1 TABLET BY MOUTH EVERY DAY 90 tablet 1    aspirin 81 MG EC tablet Take 81 mg by mouth daily      diclofenac sodium 1 % GEL Apply 2 g topically 4 times daily 1 Tube 0    Vitamin D (CHOLECALCIFEROL) 1000 UNITS CAPS capsule Take 1,000 Units by mouth daily.  Saw [place] 450 MG CAPS Take 1 tablet by mouth 2 times daily.  Coenzyme Q-10 100 MG CAPS Take 1 tablet by mouth daily.  Multiple Vitamin (MULTI VITAMIN MENS PO) Take by mouth daily        No current facility-administered medications for this visit.        Past Medical History:   Diagnosis Date    Cephalgia     Hyperlipidemia     Left knee pain        Family History   Problem Relation Age of Onset    Cancer Mother     Cancer Father         LIVER       Past Surgical History:   Procedure Laterality Date    COLONOSCOPY  3/2013    normal Dr Davis Cousin repeat 2023    KNEE CARTILAGE SURGERY  1980\"S LEFT KNEE    PAIN MANAGEMENT PROCEDURE Left 9/2/2020    LEFT C3, C4, C5 AND C6 MEDIAL BRANCH BLOCKS WITH STEROID WITH FLUOROSCOPY (48720, 28039)  #1 performed by Leon Soulier, MD at [address] [place] [address]  1980'S    LEFT   [address]       Social History     Socioeconomic History    Marital status:      Spouse name: Not on file    Number of children: Not on file    Years of education: Not on file    Highest education level: Not on file   Occupational History    Not on file   Tobacco Use    Smoking status: Never Smoker    Smokeless tobacco: Never Used   Substance and Sexual Activity    Alcohol use: Yes     Comment: OCCASIONALLY    Drug use: No    Sexual activity: Not on file   Other Topics Concern    Not on file   Social History Narrative    Not on file     Social Determinants of Health     Financial Resource Strain: Low Risk     Difficulty of Paying Living Expenses: Not hard at all   Food Insecurity: No Food Insecurity    Worried About [facility] in the Last Year: Never true    Blane of Food in the Last Year: Never true   Transportation Needs:     Lack of Transportation (Medical):  Lack of Transportation (Non-Medical):    Physical Activity:     Days of Exercise per Week:     Minutes of Exercise per Session:    Stress:     Feeling of Stress :    Social Connections:     Frequency of Communication with Friends and Family:     Frequency of Social Gatherings with Friends and Family:     Attends Scientologist Services:     Active Member of Clubs or Organizations:     Attends Club or Organization Meetings:     Marital Status:    Intimate Partner Violence:     Fear of Current or Ex-Partner:     Emotionally Abused:     Physically Abused:     Sexually Abused:        Review of Systems  Review of Systems   Constitutional: Positive for unexpected weight change (down a few # ). HENT: Negative. Eyes: Negative.          Glasses  Early cataracts    Respiratory: Negative. Cardiovascular: Negative. Gastrointestinal: Negative. Colonoscopy was ok repeat 2023   Endocrine: Negative. Genitourinary: Positive for frequency (occ nocturia ). Episode of renal colic as noted prn meds   occ ED symptoms to try Viagra some mild increase in urinary freq    Musculoskeletal: Positive for arthralgias ( L knee pain is getting worse ) and gait problem (L knee  pain on and off c/o ). Negative for myalgias. Skin: Negative. Allergic/Immunologic: Negative. Neurological: Positive for dizziness (occ sx  occ balance . sx ). Hematological: Negative. Psychiatric/Behavioral: Negative. Objective:   Physical Exam:  Physical Exam  Constitutional:       Appearance: He is well-developed. HENT:      Head: Normocephalic and atraumatic. Right Ear: Tympanic membrane, ear canal and external ear normal.      Left Ear: Tympanic membrane, ear canal and external ear normal.   Eyes:      Extraocular Movements: Extraocular movements intact. Conjunctiva/sclera: Conjunctivae normal.      Pupils: Pupils are equal, round, and reactive to light. Neck:      Thyroid: No thyromegaly. Trachea: No tracheal deviation. Cardiovascular:      Rate and Rhythm: Normal rate and regular rhythm. Pulses: Normal pulses. Heart sounds: Normal heart sounds. Pulmonary:      Effort: Pulmonary effort is normal.      Breath sounds: Normal breath sounds. Abdominal:      General: Abdomen is flat. Bowel sounds are normal.      Palpations: Abdomen is soft. Genitourinary:     Penis: Normal. No tenderness. Prostate: Normal.      Rectum: Normal. Guaiac result negative. Musculoskeletal:         General: Normal range of motion. Cervical back: Normal range of motion and neck supple. Skin:     General: Skin is warm and dry. Neurological:      General: No focal deficit present.       Mental Status: He is alert and oriented to person, place, and time. Deep Tendon Reflexes: Reflexes are normal and symmetric. Psychiatric:         Mood and Affect: Mood normal.         Behavior: Behavior normal.         Thought Content:  Thought content normal.         /74 (Site: Right Upper Arm, Position: Sitting, Cuff Size: Medium Adult)   Pulse 66   Resp 12   Ht 6' 2\" (1.88 m)   Wt 198 lb 3.2 oz (89.9 kg)   BMI 25.45 kg/m²       Assessment & Plan:         Vitamin D deficiency   Well-controlled, lifestyle modifications recommended    Ulnar nerve entrapment at elbow, left    occ symptoms prn meds    Nephrolithiasis  No recent episodes keep hydrated etc    Hyperlipidemia  Stable continue current meds and return in 3 mo.    close to goal cont meds to work on diet      Chronic right shoulder pain   Well-controlled, continue current medications and lifestyle modifications recommended    Chronic pain of both knees   Well-controlled, continue current medications and lifestyle modifications recommended    Chronic fatigue  On and off c/o cont to exercise     Other male erectile dysfunction  occ symptoms to try Viagra     Well adult exam  Within normal limits for age- cont to work no ADL issues,immunizations up to date, no depression ,no cognitive impairment  Colonoscopy up to date  Eye exam up to date  Exercises as tolerated    No living will but does not want resuscitation   Findings and recommendations discussed with Pt      Geraldo Prajapatiing  1945    No Known Allergies  Current Outpatient Medications   Medication Sig Dispense Refill    sildenafil (VIAGRA) 100 MG tablet Take 1 tablet by mouth as needed for Erectile Dysfunction 20 tablet 2    meloxicam (MOBIC) 7.5 MG tablet TAKE 1 TABLET BY MOUTH ONE TIME A DAY 90 tablet 1    atorvastatin (LIPITOR) 40 MG tablet TAKE 1 TABLET BY MOUTH EVERY DAY 90 tablet 1    aspirin 81 MG EC tablet Take 81 mg by mouth daily      diclofenac sodium 1 % GEL Apply 2 g topically 4 times daily 1 Tube 0    Vitamin D (CHOLECALCIFEROL) 1000 UNITS CAPS capsule Take 1,000 Units by mouth daily.  Saw [place] 450 MG CAPS Take 1 tablet by mouth 2 times daily.  Coenzyme Q-10 100 MG CAPS Take 1 tablet by mouth daily.  Multiple Vitamin (MULTI VITAMIN MENS PO) Take by mouth daily        No current facility-administered medications for this visit. Vitals:    09/08/21 0843   BP: 124/74   Site: Right Upper Arm   Position: Sitting   Cuff Size: Medium Adult   Pulse: 66   Resp: 12   Weight: 198 lb 3.2 oz (89.9 kg)   Height: 6' 2\" (1.88 m)     Body mass index is 25.45 kg/m². Wt Readings from Last 3 Encounters:   09/08/21 198 lb 3.2 oz (89.9 kg)   03/03/21 204 lb 2.3 oz (92.6 kg)   02/17/21 204 lb 3.2 oz (92.6 kg)     BP Readings from Last 3 Encounters:   09/08/21 124/74   02/17/21 122/76   09/02/20 136/75       Consultants:   Patient Care Team:  Sofia Sheridan MD as PCP - General (Internal Medicine)  Sofia Sheridan MD as PCP - REHABILITATION [facility] Empaneled Provider  Elisabeth Cruz MD as Consulting Physician (Otolaryngology)    Chief Complaint:   Jackie Akers is a 70 y.o. male who presents for Medicare Preventive Physical Examination with Personalized Prevention Plan Services. HPI: Tr review listed chronic problems     Patient Active Problem List   Diagnosis    Hyperlipidemia    Well adult exam    Vitamin D deficiency    Nephrolithiasis    Chronic fatigue    Vertigo    Tingling    Ulnar nerve entrapment at elbow, left    Chronic pain of both knees    Chronic right shoulder pain    Other male erectile dysfunction       Mood Disorders Screen:  Risk factors: none  Symptoms:  endorses none, denies depressed mood, difficulty concentrating, difficulty sleeping, reduced interest in activities and changes in appetite     Safety Assessment:  Functional ability/ADLs:  Difficulty with bathing- no, grooming- no, meals- no, incontinence- no.  Driving- yes. Home safety: Lives with family wife.   Number of stairs to enter home: 1, within home: 12 between floors. Risk factors for falls: none. Home environment modifications:  no.     End of Life Planning:  Advanced Directive: Power of  for healthcare on file.       Preventive Care:  Self-testicular exams: Yes  Last PSA: 2.78, normal  Last colonoscopy: 2013, normal  AAA screening:  no   Last eye exam: 2021, S/P cataracts  Exercise: yes  Seatbelt use: yes      Immunization History   Administered Date(s) Administered    COVID-19, Pfizer, PF, 30mcg/0.3mL 02/20/2021, 03/13/2021    Pneumococcal Conjugate 13-valent ([id number]) 05/11/2015    Pneumococcal Polysaccharide ([id number]) 03/22/2013    Tdap (Boostrix, Adacel) 07/08/2011       Past Medical History:   Diagnosis Date    Cephalgia     Hyperlipidemia     Left knee pain      Past Surgical History:   Procedure Laterality Date    COLONOSCOPY  3/2013    normal Dr Davis Cousin repeat 2023    KNEE CARTILAGE SURGERY  1980\"S    LEFT KNEE    PAIN MANAGEMENT PROCEDURE Left 9/2/2020    LEFT C3, C4, C5 AND C6 MEDIAL BRANCH BLOCKS WITH STEROID WITH FLUOROSCOPY (88407, 76607)  #1 performed by Halima Brown MD at [address] [place] [address]  1980'S    LEFT   [address]     Family History   Problem Relation Age of Onset    Cancer Mother     Cancer Father         LIVER     Social History     Socioeconomic History    Marital status:      Spouse name: Not on file    Number of children: Not on file    Years of education: Not on file    Highest education level: Not on file   Occupational History    Not on file   Tobacco Use    Smoking status: Never Smoker    Smokeless tobacco: Never Used   Substance and Sexual Activity    Alcohol use: Yes     Comment: OCCASIONALLY    Drug use: No    Sexual activity: Not on file   Other Topics Concern    Not on file   Social History Narrative    Not on file     Social Determinants of Health     Financial Resource Strain: Low Risk     Difficulty of Paying Living Expenses: Not hard at all   Food Insecurity: No Food Insecurity    Worried About [address] [employer] in the Last Year: Never true    Ran Out of Food in the Last Year: Never true   Transportation Needs:     Lack of Transportation (Medical):  Lack of Transportation (Non-Medical):    Physical Activity:     Days of Exercise per Week:     Minutes of Exercise per Session:    Stress:     Feeling of Stress :    Social Connections:     Frequency of Communication with Friends and Family:     Frequency of Social Gatherings with Friends and Family:     Attends Yazidism Services:     Active Member of Clubs or Organizations:     Attends Club or Organization Meetings:     Marital Status:    Intimate Partner Violence:     Fear of Current or Ex-Partner:     Emotionally Abused:     Physically Abused:     Sexually Abused:      }        Visual Acuity: Corrected:            L  20/20            R 20/25    Cognitive Screening:  Clock drawing test score: 5/5. Mini-mental status exam score: NA. Assessment/Plan:  Rafael Vazquez was seen today for medicare awv and discuss labs. Diagnoses and all orders for this visit:    Vitamin D deficiency    Ulnar nerve entrapment at elbow, left    Nephrolithiasis    Mixed hyperlipidemia    Chronic right shoulder pain    Chronic pain of both knees    Chronic fatigue    Other male erectile dysfunction    Well adult exam    Other orders  -     sildenafil (VIAGRA) 100 MG tablet; Take 1 tablet by mouth as needed for Erectile Dysfunction      Medicare Annual Wellness Visit  Name: Miri Tierney Date: 2021   MRN: [id number] Sex: Male   Age: 70 y.o. Ethnicity: Non- / Non    : 1945 Race: White (non-)      Irma Maldonado is here for Medicare AWV and Discuss Labs    Screenings for behavioral, psychosocial and functional/safety risks, and cognitive dysfunction are all negative except as indicated below.  These results, as well as other patient data from the Health Risk Assessment form, are documented in Flowsheets linked to this Encounter. No Known Allergies    Prior to Visit Medications    Medication Sig Taking? Authorizing Provider   sildenafil (VIAGRA) 100 MG tablet Take 1 tablet by mouth as needed for Erectile Dysfunction Yes Orquidea Vargas MD   meloxicam (MOBIC) 7.5 MG tablet TAKE 1 TABLET BY MOUTH ONE TIME A DAY Yes Orquidea Vargas MD   atorvastatin (LIPITOR) 40 MG tablet TAKE 1 TABLET BY MOUTH EVERY DAY Yes Orquidea Vargas MD   aspirin 81 MG EC tablet Take 81 mg by mouth daily Yes Historical Provider, MD   diclofenac sodium 1 % GEL Apply 2 g topically 4 times daily Yes Orquidea Vargas MD   Vitamin D (CHOLECALCIFEROL) 1000 UNITS CAPS capsule Take 1,000 Units by mouth daily. Yes Historical Provider, MD Wren [place] 450 MG CAPS Take 1 tablet by mouth 2 times daily. Yes Historical Provider, MD   Coenzyme Q-10 100 MG CAPS Take 1 tablet by mouth daily.    Yes Historical Provider, MD   Multiple Vitamin (MULTI VITAMIN MENS PO) Take by mouth daily  Yes Historical Provider, MD       Past Medical History:   Diagnosis Date    Cephalgia     Hyperlipidemia     Left knee pain        Past Surgical History:   Procedure Laterality Date    COLONOSCOPY  3/2013    normal Dr Thelma Baez repeat 2023    KNEE CARTILAGE SURGERY  1980\"S    LEFT KNEE    PAIN MANAGEMENT PROCEDURE Left 9/2/2020    LEFT C3, C4, C5 AND C6 MEDIAL BRANCH BLOCKS WITH STEROID WITH FLUOROSCOPY (95711, 28867)  #1 performed by Daisy Aguilar MD at [address] [place] [address]  1980'S    LEFT    TONSILLECTOMY AND ADENOIDECTOMY  1952       Family History   Problem Relation Age of Onset    Cancer Mother     Cancer Father         LIVER       CareTeam (Including outside providers/suppliers regularly involved in providing care):   Patient Care Team:  Orquidea Vargas MD as PCP - General (Internal Medicine)  Orquidea Vargas MD as PCP - [facility]  Angelic Moyer MD as Consulting Physician (Otolaryngology)    Wt Readings from Last 3 Encounters:   09/08/21 198 lb 3.2 oz (89.9 kg)   03/03/21 204 lb 2.3 oz (92.6 kg)   02/17/21 204 lb 3.2 oz (92.6 kg)     Vitals:    09/08/21 0843   BP: 124/74   Site: Right Upper Arm   Position: Sitting   Cuff Size: Medium Adult   Pulse: 66   Resp: 12   Weight: 198 lb 3.2 oz (89.9 kg)   Height: 6' 2\" (1.88 m)     Body mass index is 25.45 kg/m². Based upon direct observation of the patient, evaluation of cognition reveals n ormal.        Patient's complete Health Risk Assessment and screening values have been reviewed and are found in Flowsheets. The following problems were reviewed today and where indicated follow up appointments were made and/or referrals ordered.     Positive Risk Factor Screenings with Interventions:            Hearing/Vision:  No exam data present  Hearing/Vision  Do you or your family notice any trouble with your hearing that hasn't been managed with hearing aids?: (!) Yes  Do you have difficulty driving, watching TV, or doing any of your daily activities because of your eyesight?: No  Have you had an eye exam within the past year?: Yes  Hearing/Vision Interventions:  · Hearing concerns:  audiology referral provided    Safety:  Safety  Do you have working smoke detectors?: Yes  Have all throw rugs been removed or fastened?: Yes  Do you have non-slip mats or surfaces in all bathtubs/showers?: (!) No  Do all of your stairways have a railing or banister?: Yes  Are your doorways, halls and stairs free of clutter?: Yes  Do you always fasten your seatbelt when you are in a car?: Yes  Safety Interventions:  · Home safety tips provided     Personalized Preventive Plan   Current Health Maintenance Status  Immunization History   Administered Date(s) Administered    COVID-19, Pfizer, PF, 30mcg/0.3mL 02/20/2021, 03/13/2021    Pneumococcal Conjugate 13-valent (Vuvidyr34) 05/11/2015    Pneumococcal Polysaccharide ([id number]) 03/22/2013  Tdap (Boostrix, Adacel) 07/08/2011        Health Maintenance   Topic Date Due    Shingles Vaccine (1 of 2) Never done   Ramon Shultz Annual Wellness Visit (AWV)  Never done    DTaP/Tdap/Td vaccine (2 - Td or Tdap) 07/08/2021    Flu vaccine (1) Never done    Lipid screen  08/04/2022    Colon cancer screen colonoscopy  03/05/2023    Pneumococcal 65+ years Vaccine  Completed    COVID-19 Vaccine  Completed    Hepatitis C screen  Completed    Hepatitis A vaccine  Aged Out    Hepatitis B vaccine  Aged Out    Hib vaccine  Aged Out    Meningococcal (ACWY) vaccine  Aged Out     Recommendations for [employer] Due: see orders and patient instructions/AVS.  . Recommended screening schedule for the next 5-10 years is provided to the patient in written form: see Patient Instructions/AVS.    Amalia Vigil was seen today for medicare awv and discuss labs. Diagnoses and all orders for this visit:    Vitamin D deficiency    Ulnar nerve entrapment at elbow, left    Nephrolithiasis    Mixed hyperlipidemia    Chronic right shoulder pain    Chronic pain of both knees    Chronic fatigue    Other male erectile dysfunction    Well adult exam    Other orders  -     sildenafil (VIAGRA) 100 MG tablet;  Take 1 tablet by mouth as needed for Erectile Dysfunction

## 2021-09-08 NOTE — PATIENT INSTRUCTIONS
Personalized Preventive Plan for Mariah Langley - 9/8/2021  Medicare offers a range of preventive health benefits. Some of the tests and screenings are paid in full while other may be subject to a deductible, co-insurance, and/or copay. Some of these benefits include a comprehensive review of your medical history including lifestyle, illnesses that may run in your family, and various assessments and screenings as appropriate. After reviewing your medical record and screening and assessments performed today your provider may have ordered immunizations, labs, imaging, and/or referrals for you. A list of these orders (if applicable) as well as your Preventive Care list are included within your After Visit Summary for your review. Other Preventive Recommendations:    · A preventive eye exam performed by an eye specialist is recommended every 1-2 years to screen for glaucoma; cataracts, macular degeneration, and other eye disorders. · A preventive dental visit is recommended every 6 months. · Try to get at least 150 minutes of exercise per week or 10,000 steps per day on a pedometer . · Order or download the FREE \"Exercise & Physical Activity: Your Everyday Guide\" from [employer]. Call 5-791.699.5411 or search [employer] online. · You need 8178-7950 mg of calcium and 8609-3214 IU of vitamin D per day. It is possible to meet your calcium requirement with diet alone, but a vitamin D supplement is usually necessary to meet this goal.  · When exposed to the sun, use a sunscreen that protects against both UVA and UVB radiation with an SPF of 30 or greater. Reapply every 2 to 3 hours or after sweating, drying off with a towel, or swimming. · Always wear a seat belt when traveling in a car. Always wear a helmet when riding a bicycle or motorcycle.

## 2021-10-12 ENCOUNTER — TELEPHONE (OUTPATIENT)
Dept: INTERNAL MEDICINE CLINIC | Age: 76
End: 2021-10-12

## 2021-10-12 RX ORDER — MELOXICAM 7.5 MG/1
TABLET ORAL
Qty: 90 TABLET | Refills: 1 | Status: SHIPPED | OUTPATIENT
Start: 2021-10-12 | End: 2022-03-17

## 2021-10-12 NOTE — TELEPHONE ENCOUNTER
Patient called in requesting refill for the following medication:      meloxicam (MOBIC) 7.5 MG tablet         [facility] [address] [facility] [address]    Pls advise.

## 2021-11-01 ENCOUNTER — TELEPHONE (OUTPATIENT)
Dept: INTERNAL MEDICINE CLINIC | Age: 76
End: 2021-11-01

## 2021-11-01 NOTE — TELEPHONE ENCOUNTER
Pt Needs refill on atorvastatin (LIPITOR) 40 MG tablet       PHARM: [address] [facility], [facility] [address]    Please advise

## 2021-11-02 ENCOUNTER — OFFICE VISIT (OUTPATIENT)
Dept: ORTHOPEDIC SURGERY | Age: 76
End: 2021-11-02
Payer: MEDICARE

## 2021-11-02 VITALS — WEIGHT: 198 LBS | HEIGHT: 74 IN | BODY MASS INDEX: 25.41 KG/M2

## 2021-11-02 DIAGNOSIS — M19.011 OSTEOARTHRITIS OF GLENOHUMERAL JOINT, RIGHT: Primary | ICD-10-CM

## 2021-11-02 PROCEDURE — 3017F COLORECTAL CA SCREEN DOC REV: CPT | Performed by: ORTHOPAEDIC SURGERY

## 2021-11-02 PROCEDURE — 99213 OFFICE O/P EST LOW 20 MIN: CPT | Performed by: ORTHOPAEDIC SURGERY

## 2021-11-02 PROCEDURE — 20610 DRAIN/INJ JOINT/BURSA W/O US: CPT | Performed by: ORTHOPAEDIC SURGERY

## 2021-11-02 PROCEDURE — G8484 FLU IMMUNIZE NO ADMIN: HCPCS | Performed by: ORTHOPAEDIC SURGERY

## 2021-11-02 PROCEDURE — 1123F ACP DISCUSS/DSCN MKR DOCD: CPT | Performed by: ORTHOPAEDIC SURGERY

## 2021-11-02 PROCEDURE — G8417 CALC BMI ABV UP PARAM F/U: HCPCS | Performed by: ORTHOPAEDIC SURGERY

## 2021-11-02 PROCEDURE — 4040F PNEUMOC VAC/ADMIN/RCVD: CPT | Performed by: ORTHOPAEDIC SURGERY

## 2021-11-02 PROCEDURE — 1036F TOBACCO NON-USER: CPT | Performed by: ORTHOPAEDIC SURGERY

## 2021-11-02 PROCEDURE — G8428 CUR MEDS NOT DOCUMENT: HCPCS | Performed by: ORTHOPAEDIC SURGERY

## 2021-11-02 RX ORDER — METHYLPREDNISOLONE ACETATE 40 MG/ML
80 INJECTION, SUSPENSION INTRA-ARTICULAR; INTRALESIONAL; INTRAMUSCULAR; SOFT TISSUE ONCE
Status: COMPLETED | OUTPATIENT
Start: 2021-11-02 | End: 2021-11-02

## 2021-11-02 RX ORDER — LIDOCAINE HYDROCHLORIDE 10 MG/ML
8 INJECTION, SOLUTION INFILTRATION; PERINEURAL ONCE
Status: COMPLETED | OUTPATIENT
Start: 2021-11-02 | End: 2021-11-02

## 2021-11-02 RX ADMIN — METHYLPREDNISOLONE ACETATE 80 MG: 40 INJECTION, SUSPENSION INTRA-ARTICULAR; INTRALESIONAL; INTRAMUSCULAR; SOFT TISSUE at 16:10

## 2021-11-02 RX ADMIN — LIDOCAINE HYDROCHLORIDE 8 ML: 10 INJECTION, SOLUTION INFILTRATION; PERINEURAL at 16:10

## 2021-11-02 NOTE — PROGRESS NOTES
Chief Complaint    Shoulder Pain (F/U RIGHT SHOULDER)      History of Present Illness:  Tavon Sue is a pleasant, 78 y.o., male, here today for follow up of his right shoulder. We saw this patient earlier this year and administered an intra-articular corticosteroid injection for right glenohumeral osteoarthritis as well as rotator cuff tendonitis. He did very well with this injection - it provided many months of relief. He was excited he was able to go back to playing golf this summer. He feels his recent influx of pain is due to weather-related changes. He has continued his home exercises, additionally he takes meloxicam. He reports no new injuries or setbacks. Medical History:  Patient's medications, allergies, past medical, surgical, social and family histories were reviewed and updated as appropriate. No notes on file    Review of Systems  A 14 point review of systems was completed by the patient on 3/3/21 and is available in the media section of the scanned medical record and was reviewed on 11/2/2021. The review is negative with the exception of those things mentioned in the HPI and Past Medical History    Vital Signs: There were no vitals filed for this visit. General/Appearance: Alert and oriented and in no apparent distress. Skin:  There are no skin lesions, cellulitis, or extreme edema. The patient has warm and well-perfused Bilateral upper extremities with brisk capillary refill. Right Shoulder Exam:  Inspection: No gross deformities, no signs of infection. Palpation: joint line tenderness    Active Range of Motion: Forward Elevation 120-130, External Rotation 40, Internal Rotation L2 vs T10 on the left    Passive Range of Motion: Forward Elevation 140    Strength:  External Rotation 5/5    Special Tests: No Maxim muscle deformity.     Neurovascular: Sensation to light touch is intact, no motor deficits, palpable radial pulses 2+      Radiology:     No new XR obtained at this time. Assessment :  Mr. Tony Martin is a pleasant, 78 y.o. patient with glenohumeral osteoarthritis of the right shoulder. He is a candidate for a repeat corticosteroid injection today. Impression:  Encounter Diagnosis   Name Primary?  Osteoarthritis of glenohumeral joint, right Yes       Office Procedures:  Orders Placed This Encounter   Procedures    MD ARTHROCENTESIS ASPIR&/INJ MAJOR JT/BURSA W/O US       Treatment Plan:  We do feel he will benefit from repeat intra-articular corticosteroid injection today. We may continue with this treatment as long as he finds meaningful relief from symptoms. We recommend he continue his home exercises as a long-term maintenance program. He is agreeable to this. We will see Christiano Whiteheaddden back in 3 months and/or as needed. All questions were answered to patient's satisfaction and He was encouraged to call with any further questions or concerns. Yulissa Sorto is in agreement with this plan. Procedure Note  After discussing the risks and benefits of a corticosteroid injection, Christiano Peacock did state an understanding and gave verbal consent to proceed. After cleansing the injection site with Chlora-prep and using aseptic techniques,  2 CC of Depo Medrol 40mg/ml and 8 CC of 1% lidocaine were injected in the right glenohumeral joint. He  tolerated the procedure well with no immediate adverse sequelae after the injection. A bandage was placed over the injection site. Appropriate post injections instructions were given to the patient. 11/2/2021  3:11 PM    Lisa Woodall ATC  [facility] [address]    During this examination, I, Mat Herlinda, functioned as a scribe for Dr. Roseline Urban. The history taking and physical examination were performed by Dr. Jaida Singh. All counseling during the appointment was performed between the patient and Dr. Jaida Singh.  11/2/21    ______________  Dr. Donavan PITTS Malaika Alatorre, personally performed the services described in this documentation as described by Mino Reddy ATC in my presence, and it is both accurate and complete. Michelle Alatorre MD, PhD  11/2/2021

## 2021-11-03 RX ORDER — ATORVASTATIN CALCIUM 40 MG/1
TABLET, FILM COATED ORAL
Qty: 90 TABLET | Refills: 1 | Status: SHIPPED | OUTPATIENT
Start: 2021-11-03 | End: 2022-03-17

## 2021-12-10 ENCOUNTER — TELEPHONE (OUTPATIENT)
Dept: INTERNAL MEDICINE CLINIC | Age: 76
End: 2021-12-10

## 2021-12-16 ENCOUNTER — TELEPHONE (OUTPATIENT)
Dept: INTERNAL MEDICINE CLINIC | Age: 76
End: 2021-12-16

## 2021-12-16 NOTE — TELEPHONE ENCOUNTER
Patient called in stating that he is seeing where they are talking about a Pfizer pill and what the availability of it would be and what the situation is on it     Please call to advise

## 2021-12-20 NOTE — TELEPHONE ENCOUNTER
Pt heard information regarding Pfizer pill and being able to take a pill 1-2 days from feeling symptoms of Covid, but it has not been approved yet.

## 2022-01-05 NOTE — TELEPHONE ENCOUNTER
Patient called in wanting to know if he got both his pneumonia shots and his shingles shot.        Patient also needs a refill for the following medication:      meloxicam (MOBIC) 7.5 MG tablet      atorvastatin (LIPITOR) 40 MG tablet      [facility] [address] [place], [address]      Pls call and advise

## 2022-01-06 NOTE — TELEPHONE ENCOUNTER
I spoke with the patient about this. Informed that he has not has shingles vaccine yet. Also informed that he has a refill left at pharm and to contact them to get refill.

## 2022-03-17 RX ORDER — MELOXICAM 7.5 MG/1
TABLET ORAL
Qty: 30 TABLET | Refills: 0 | Status: SHIPPED | OUTPATIENT
Start: 2022-03-17 | End: 2022-04-20 | Stop reason: SDUPTHER

## 2022-03-17 RX ORDER — ATORVASTATIN CALCIUM 40 MG/1
TABLET, FILM COATED ORAL
Qty: 30 TABLET | Refills: 0 | Status: SHIPPED | OUTPATIENT
Start: 2022-03-17 | End: 2022-05-24 | Stop reason: SDUPTHER

## 2022-04-20 ENCOUNTER — TELEPHONE (OUTPATIENT)
Dept: INTERNAL MEDICINE CLINIC | Age: 77
End: 2022-04-20

## 2022-04-20 DIAGNOSIS — E78.2 MIXED HYPERLIPIDEMIA: Primary | ICD-10-CM

## 2022-04-20 RX ORDER — MELOXICAM 7.5 MG/1
TABLET ORAL
Qty: 30 TABLET | Refills: 0 | Status: SHIPPED | OUTPATIENT
Start: 2022-04-20 | End: 2022-06-02 | Stop reason: SDUPTHER

## 2022-04-20 NOTE — TELEPHONE ENCOUNTER
Pt needs a refill on meloxicam (MOBIC) 7.5 MG tablet           Pharm [facility]. [address], [facility]   [address] [facility] [address]   Phone:  [phone]  Fax:  [phone]      Please advise

## 2022-05-02 DIAGNOSIS — E78.2 MIXED HYPERLIPIDEMIA: ICD-10-CM

## 2022-05-02 LAB
A/G RATIO: 1.9 (ref 1.1–2.2)
ALBUMIN SERPL-MCNC: 4.4 G/DL (ref 3.4–5)
ALP BLD-CCNC: 64 U/L (ref 40–129)
ALT SERPL-CCNC: 22 U/L (ref 10–40)
ANION GAP SERPL CALCULATED.3IONS-SCNC: 9 MMOL/L (ref 3–16)
AST SERPL-CCNC: 22 U/L (ref 15–37)
BASOPHILS ABSOLUTE: 0 K/UL (ref 0–0.2)
BASOPHILS RELATIVE PERCENT: 0.8 %
BILIRUB SERPL-MCNC: 0.6 MG/DL (ref 0–1)
BUN BLDV-MCNC: 22 MG/DL (ref 7–20)
CALCIUM SERPL-MCNC: 9 MG/DL (ref 8.3–10.6)
CHLORIDE BLD-SCNC: 104 MMOL/L (ref 99–110)
CHOLESTEROL, TOTAL: 179 MG/DL (ref 0–199)
CO2: 27 MMOL/L (ref 21–32)
CREAT SERPL-MCNC: 1.1 MG/DL (ref 0.8–1.3)
EOSINOPHILS ABSOLUTE: 0.3 K/UL (ref 0–0.6)
EOSINOPHILS RELATIVE PERCENT: 6.6 %
GFR AFRICAN AMERICAN: >60
GFR NON-AFRICAN AMERICAN: >60
GLUCOSE BLD-MCNC: 100 MG/DL (ref 70–99)
HCT VFR BLD CALC: 43 % (ref 40.5–52.5)
HDLC SERPL-MCNC: 50 MG/DL (ref 40–60)
HEMOGLOBIN: 14.7 G/DL (ref 13.5–17.5)
LDL CHOLESTEROL CALCULATED: 111 MG/DL
LYMPHOCYTES ABSOLUTE: 1.4 K/UL (ref 1–5.1)
LYMPHOCYTES RELATIVE PERCENT: 30.7 %
MCH RBC QN AUTO: 30.3 PG (ref 26–34)
MCHC RBC AUTO-ENTMCNC: 34.1 G/DL (ref 31–36)
MCV RBC AUTO: 88.9 FL (ref 80–100)
MONOCYTES ABSOLUTE: 0.4 K/UL (ref 0–1.3)
MONOCYTES RELATIVE PERCENT: 9.8 %
NEUTROPHILS ABSOLUTE: 2.3 K/UL (ref 1.7–7.7)
NEUTROPHILS RELATIVE PERCENT: 52.1 %
PDW BLD-RTO: 14.4 % (ref 12.4–15.4)
PLATELET # BLD: 196 K/UL (ref 135–450)
PMV BLD AUTO: 9.4 FL (ref 5–10.5)
POTASSIUM SERPL-SCNC: 5 MMOL/L (ref 3.5–5.1)
RBC # BLD: 4.83 M/UL (ref 4.2–5.9)
SODIUM BLD-SCNC: 140 MMOL/L (ref 136–145)
TOTAL PROTEIN: 6.7 G/DL (ref 6.4–8.2)
TRIGL SERPL-MCNC: 89 MG/DL (ref 0–150)
TSH REFLEX: 1.97 UIU/ML (ref 0.27–4.2)
VLDLC SERPL CALC-MCNC: 18 MG/DL
WBC # BLD: 4.4 K/UL (ref 4–11)

## 2022-05-09 ENCOUNTER — OFFICE VISIT (OUTPATIENT)
Dept: INTERNAL MEDICINE CLINIC | Age: 77
End: 2022-05-09
Payer: MEDICARE

## 2022-05-09 VITALS
BODY MASS INDEX: 25.57 KG/M2 | HEIGHT: 74 IN | WEIGHT: 199.2 LBS | SYSTOLIC BLOOD PRESSURE: 122 MMHG | TEMPERATURE: 97.9 F | DIASTOLIC BLOOD PRESSURE: 72 MMHG | RESPIRATION RATE: 12 BRPM | HEART RATE: 68 BPM

## 2022-05-09 DIAGNOSIS — M25.562 CHRONIC PAIN OF BOTH KNEES: ICD-10-CM

## 2022-05-09 DIAGNOSIS — G89.29 CHRONIC PAIN OF BOTH KNEES: ICD-10-CM

## 2022-05-09 DIAGNOSIS — G89.29 CHRONIC RIGHT SHOULDER PAIN: ICD-10-CM

## 2022-05-09 DIAGNOSIS — R53.82 CHRONIC FATIGUE: ICD-10-CM

## 2022-05-09 DIAGNOSIS — E55.9 VITAMIN D DEFICIENCY: ICD-10-CM

## 2022-05-09 DIAGNOSIS — M26.621 TMJ TENDERNESS, RIGHT: ICD-10-CM

## 2022-05-09 DIAGNOSIS — M25.561 CHRONIC PAIN OF BOTH KNEES: ICD-10-CM

## 2022-05-09 DIAGNOSIS — N52.8 OTHER MALE ERECTILE DYSFUNCTION: ICD-10-CM

## 2022-05-09 DIAGNOSIS — E78.2 MIXED HYPERLIPIDEMIA: Primary | ICD-10-CM

## 2022-05-09 DIAGNOSIS — N20.0 NEPHROLITHIASIS: ICD-10-CM

## 2022-05-09 DIAGNOSIS — M25.511 CHRONIC RIGHT SHOULDER PAIN: ICD-10-CM

## 2022-05-09 DIAGNOSIS — R35.1 NOCTURIA: ICD-10-CM

## 2022-05-09 PROCEDURE — 3017F COLORECTAL CA SCREEN DOC REV: CPT | Performed by: INTERNAL MEDICINE

## 2022-05-09 PROCEDURE — 1123F ACP DISCUSS/DSCN MKR DOCD: CPT | Performed by: INTERNAL MEDICINE

## 2022-05-09 PROCEDURE — 99214 OFFICE O/P EST MOD 30 MIN: CPT | Performed by: INTERNAL MEDICINE

## 2022-05-09 PROCEDURE — G8417 CALC BMI ABV UP PARAM F/U: HCPCS | Performed by: INTERNAL MEDICINE

## 2022-05-09 PROCEDURE — G8427 DOCREV CUR MEDS BY ELIG CLIN: HCPCS | Performed by: INTERNAL MEDICINE

## 2022-05-09 PROCEDURE — 1036F TOBACCO NON-USER: CPT | Performed by: INTERNAL MEDICINE

## 2022-05-09 PROCEDURE — 4040F PNEUMOC VAC/ADMIN/RCVD: CPT | Performed by: INTERNAL MEDICINE

## 2022-05-09 ASSESSMENT — ENCOUNTER SYMPTOMS
RESPIRATORY NEGATIVE: 1
EYES NEGATIVE: 1
GASTROINTESTINAL NEGATIVE: 1
ALLERGIC/IMMUNOLOGIC NEGATIVE: 1

## 2022-05-09 NOTE — PROGRESS NOTES
Subjective:      Patient ID: Grzegorz Foster is a 78 y.o. male. HPI  Here today for follow up of chronic problems as per HPI and as problems listed under assessment and plan,no new c/o feels good occ R TMJ type symptoms cont prn Meloxicam etc consider W/U if it gets worse     Hyperlipidemia  This is a chronic problem. The current episode started more than 1 year ago. The problem is controlled. Recent lipid tests were reviewed and are normal. There are no known factors aggravating his hyperlipidemia. Pertinent negatives include no myalgias. Current antihyperlipidemic treatment includes diet change, exercise and statins. The current treatment provides significant improvement of lipids. There are no compliance problems. Risk factors for coronary artery disease include dyslipidemia and male sex. No Known Allergies    Current Outpatient Medications   Medication Sig Dispense Refill    meloxicam (MOBIC) 7.5 MG tablet TAKE 1 TABLET BY MOUTH ONCE DAILY 30 tablet 0    atorvastatin (LIPITOR) 40 MG tablet TAKE 1 TABLET BY MOUTH  DAILY 30 tablet 0    sildenafil (VIAGRA) 100 MG tablet Take 1 tablet by mouth as needed for Erectile Dysfunction 20 tablet 2    diclofenac sodium 1 % GEL Apply 2 g topically 4 times daily 1 Tube 0    Vitamin D (CHOLECALCIFEROL) 1000 UNITS CAPS capsule Take 1,000 Units by mouth daily.  Saw [place] 450 MG CAPS Take 1 tablet by mouth 2 times daily.  Coenzyme Q-10 100 MG CAPS Take 1 tablet by mouth daily.  Multiple Vitamin (MULTI VITAMIN MENS PO) Take by mouth daily        No current facility-administered medications for this visit.        Past Medical History:   Diagnosis Date    Cephalgia     Hyperlipidemia     Left knee pain        Family History   Problem Relation Age of Onset    Cancer Mother     Cancer Father         LIVER       Past Surgical History:   Procedure Laterality Date    COLONOSCOPY  3/2013    normal Dr Tavarez Early repeat 2023   [address] [place] [address] 1980\"S    LEFT KNEE    PAIN MANAGEMENT PROCEDURE Left 9/2/2020    LEFT C3, C4, C5 AND C6 MEDIAL BRANCH BLOCKS WITH STEROID WITH FLUOROSCOPY (02802, 95047)  #1 performed by Leon Soulier, MD at [facility]  1980'S    LEFT   [address]       Social History     Socioeconomic History    Marital status:      Spouse name: Not on file    Number of children: Not on file    Years of education: Not on file    Highest education level: Not on file   Occupational History    Not on file   Tobacco Use    Smoking status: Never Smoker    Smokeless tobacco: Never Used   Substance and Sexual Activity    Alcohol use: Yes     Comment: OCCASIONALLY    Drug use: No    Sexual activity: Not on file   Other Topics Concern    Not on file   Social History Narrative    Not on file     Social Determinants of Health     Financial Resource Strain: Low Risk     Difficulty of Paying Living Expenses: Not hard at all   Food Insecurity: No Food Insecurity    Worried About [address] in the Last Year: Never true    [address] in the Last Year: Never true   Transportation Needs:     Lack of Transportation (Medical): Not on file    Lack of Transportation (Non-Medical):  Not on file   Physical Activity:     Days of Exercise per Week: Not on file    Minutes of Exercise per Session: Not on file   Stress:     Feeling of Stress : Not on file   Social Connections:     Frequency of Communication with Friends and Family: Not on file    Frequency of Social Gatherings with Friends and Family: Not on file    Attends [employer] Services: Not on file    Active Member of Clubs or Organizations: Not on file    Attends Club or Organization Meetings: Not on file    Marital Status: Not on file   Intimate Partner Violence:     Fear of Current or Ex-Partner: Not on file    Emotionally Abused: Not on file    Physically Abused: Not on file    Sexually Abused: Not on file   Housing Stability:     Unable to Pay for Housing in the Last Year: Not on file    Number of Places Lived in the Last Year: Not on file    Unstable Housing in the Last Year: Not on file       Review of Systems  Review of Systems   Constitutional: Positive for unexpected weight change (down a few # ). HENT: Negative. Eyes: Negative. Glasses  Early cataracts    Respiratory: Negative. Cardiovascular: Negative. Gastrointestinal: Negative. Colonoscopy was ok repeat 2023   Endocrine: Negative. Genitourinary: Positive for frequency (occ nocturia ). Episode of renal colic as noted prn meds   occ ED symptoms to try Viagra some mild increase in urinary freq    Musculoskeletal: Positive for arthralgias ( L knee pain is getting worse ) and gait problem (L knee  pain on and off c/o ). Negative for myalgias. Skin: Negative. Allergic/Immunologic: Negative. Neurological: Positive for dizziness (occ sx  occ balance . sx ). Hematological: Negative. Psychiatric/Behavioral: Negative. Objective:   Physical Exam:  Physical Exam  Constitutional:       Appearance: Normal appearance. He is well-developed. HENT:      Head: Normocephalic and atraumatic. Right Ear: External ear normal.      Left Ear: External ear normal.   Eyes:      Extraocular Movements: Extraocular movements intact. Conjunctiva/sclera: Conjunctivae normal.      Pupils: Pupils are equal, round, and reactive to light. Neck:      Thyroid: No thyromegaly. Trachea: No tracheal deviation. Cardiovascular:      Rate and Rhythm: Normal rate and regular rhythm. Pulses: Normal pulses. Heart sounds: Normal heart sounds. Pulmonary:      Effort: Pulmonary effort is normal.      Breath sounds: Normal breath sounds. Genitourinary:     Penis: No tenderness. Musculoskeletal:         General: Normal range of motion. Cervical back: Normal range of motion and neck supple.    Skin:     General: Skin is warm and dry. Neurological:      General: No focal deficit present. Mental Status: He is alert and oriented to person, place, and time. Mental status is at baseline. Deep Tendon Reflexes: Reflexes are normal and symmetric. Psychiatric:         Mood and Affect: Mood normal.         Behavior: Behavior normal.         Thought Content:  Thought content normal.         /72 (Site: Right Upper Arm, Position: Sitting, Cuff Size: Medium Adult)   Pulse 68   Temp 97.9 °F (36.6 °C) (Temporal)   Resp 12   Wt 199 lb 3.2 oz (90.4 kg)   BMI 25.58 kg/m²       Assessment & Plan:         Hyperlipidemia   Well-controlled, continue current medications and lifestyle modifications recommended Repeat labs in 6 months  With CPE    Vitamin D deficiency   Continue current meds     Nephrolithiasis   no recent episodes keep hydrated etc     Chronic pain of both knees    On and off c/o 2nd to DJD prn meds     Other male erectile dysfunction    Cont prn meds     Chronic right shoulder pain   Stable for now cont ROM exercises etc     Chronic fatigue    Labs normal age related changes     TMJ tenderness, right  occ R TMJ type symptoms cont prn Meloxicam etc consider W/U if it gets worse

## 2022-05-24 ENCOUNTER — TELEPHONE (OUTPATIENT)
Dept: INTERNAL MEDICINE CLINIC | Age: 77
End: 2022-05-24

## 2022-05-24 RX ORDER — ATORVASTATIN CALCIUM 40 MG/1
TABLET, FILM COATED ORAL
Qty: 90 TABLET | Refills: 1 | Status: SHIPPED | OUTPATIENT
Start: 2022-05-24 | End: 2022-09-13 | Stop reason: SDUPTHER

## 2022-05-24 NOTE — TELEPHONE ENCOUNTER
Patient called requesting a refill on the following medication-     atorvastatin (LIPITOR) 40 MG tablet      He wants to get a 90 day supply as well    [address], [address]   [address], [address] [facility] [place] [address]   Phone:  [phone]  Fax:  [phone]        Please advise

## 2022-06-02 ENCOUNTER — TELEPHONE (OUTPATIENT)
Dept: INTERNAL MEDICINE CLINIC | Age: 77
End: 2022-06-02

## 2022-06-02 RX ORDER — MELOXICAM 7.5 MG/1
TABLET ORAL
Qty: 90 TABLET | Refills: 1 | Status: SHIPPED | OUTPATIENT
Start: 2022-06-02 | End: 2022-09-13 | Stop reason: SDUPTHER

## 2022-08-16 ENCOUNTER — TELEPHONE (OUTPATIENT)
Dept: INTERNAL MEDICINE CLINIC | Age: 77
End: 2022-08-16

## 2022-08-16 DIAGNOSIS — R20.2 NUMBNESS AND TINGLING OF RIGHT FACE: Primary | ICD-10-CM

## 2022-08-16 DIAGNOSIS — R20.0 NUMBNESS AND TINGLING OF RIGHT FACE: Primary | ICD-10-CM

## 2022-08-16 NOTE — TELEPHONE ENCOUNTER
Pt complains of numbness & tingling on upper right cheek bone since last visit and now getting more frequent and some numbness in his left leg at night, not sure if this is related or night, pt just noticed. No pain per pt. Would like recommendations?

## 2022-08-17 NOTE — TELEPHONE ENCOUNTER
With symptoms and no relief with meloxicam as noted during last OC refer to neuro ie Dr Candice Mccann

## 2022-09-13 ENCOUNTER — HOSPITAL ENCOUNTER (OUTPATIENT)
Dept: GENERAL RADIOLOGY | Age: 77
Discharge: HOME | End: 2022-09-13
Payer: MEDICARE

## 2022-09-13 ENCOUNTER — OFFICE VISIT (OUTPATIENT)
Dept: INTERNAL MEDICINE CLINIC | Age: 77
End: 2022-09-13
Payer: MEDICARE

## 2022-09-13 VITALS
HEIGHT: 74 IN | WEIGHT: 198 LBS | TEMPERATURE: 97.7 F | BODY MASS INDEX: 25.41 KG/M2 | SYSTOLIC BLOOD PRESSURE: 140 MMHG | OXYGEN SATURATION: 97 % | HEART RATE: 63 BPM | DIASTOLIC BLOOD PRESSURE: 80 MMHG

## 2022-09-13 DIAGNOSIS — N20.0 KIDNEY STONE: Primary | ICD-10-CM

## 2022-09-13 DIAGNOSIS — N20.0 KIDNEY STONE: ICD-10-CM

## 2022-09-13 LAB
BILIRUBIN URINE: ABNORMAL
BLOOD, URINE: ABNORMAL
CLARITY: ABNORMAL
COLOR: ABNORMAL
GLUCOSE URINE: NEGATIVE MG/DL
KETONES, URINE: NEGATIVE MG/DL
LEUKOCYTE ESTERASE, URINE: ABNORMAL
MICROSCOPIC EXAMINATION: YES
NITRITE, URINE: NEGATIVE
PH UA: 5.5 (ref 5–8)
PROTEIN UA: 100 MG/DL
SPECIFIC GRAVITY UA: 1.02 (ref 1–1.03)
URINE TYPE: ABNORMAL
UROBILINOGEN, URINE: 0.2 E.U./DL

## 2022-09-13 PROCEDURE — 99213 OFFICE O/P EST LOW 20 MIN: CPT | Performed by: INTERNAL MEDICINE

## 2022-09-13 PROCEDURE — 74019 RADEX ABDOMEN 2 VIEWS: CPT

## 2022-09-13 PROCEDURE — 1123F ACP DISCUSS/DSCN MKR DOCD: CPT | Performed by: INTERNAL MEDICINE

## 2022-09-13 PROCEDURE — 1036F TOBACCO NON-USER: CPT | Performed by: INTERNAL MEDICINE

## 2022-09-13 PROCEDURE — G8417 CALC BMI ABV UP PARAM F/U: HCPCS | Performed by: INTERNAL MEDICINE

## 2022-09-13 PROCEDURE — 3017F COLORECTAL CA SCREEN DOC REV: CPT | Performed by: INTERNAL MEDICINE

## 2022-09-13 PROCEDURE — G8427 DOCREV CUR MEDS BY ELIG CLIN: HCPCS | Performed by: INTERNAL MEDICINE

## 2022-09-13 RX ORDER — MELOXICAM 7.5 MG/1
TABLET ORAL
Qty: 90 TABLET | Refills: 1 | Status: SHIPPED | OUTPATIENT
Start: 2022-09-13

## 2022-09-13 RX ORDER — ATORVASTATIN CALCIUM 40 MG/1
TABLET, FILM COATED ORAL
Qty: 90 TABLET | Refills: 1 | Status: SHIPPED | OUTPATIENT
Start: 2022-09-13

## 2022-09-13 SDOH — ECONOMIC STABILITY: FOOD INSECURITY: WITHIN THE PAST 12 MONTHS, YOU WORRIED THAT YOUR FOOD WOULD RUN OUT BEFORE YOU GOT MONEY TO BUY MORE.: NEVER TRUE

## 2022-09-13 SDOH — ECONOMIC STABILITY: FOOD INSECURITY: WITHIN THE PAST 12 MONTHS, THE FOOD YOU BOUGHT JUST DIDN'T LAST AND YOU DIDN'T HAVE MONEY TO GET MORE.: NEVER TRUE

## 2022-09-13 ASSESSMENT — ENCOUNTER SYMPTOMS
EYE REDNESS: 0
BACK PAIN: 0
ABDOMINAL PAIN: 0
SHORTNESS OF BREATH: 0
CHEST TIGHTNESS: 0
NAUSEA: 0

## 2022-09-13 ASSESSMENT — PATIENT HEALTH QUESTIONNAIRE - PHQ9
SUM OF ALL RESPONSES TO PHQ QUESTIONS 1-9: 0
SUM OF ALL RESPONSES TO PHQ9 QUESTIONS 1 & 2: 0
2. FEELING DOWN, DEPRESSED OR HOPELESS: 0
SUM OF ALL RESPONSES TO PHQ QUESTIONS 1-9: 0
1. LITTLE INTEREST OR PLEASURE IN DOING THINGS: 0

## 2022-09-13 ASSESSMENT — SOCIAL DETERMINANTS OF HEALTH (SDOH): HOW HARD IS IT FOR YOU TO PAY FOR THE VERY BASICS LIKE FOOD, HOUSING, MEDICAL CARE, AND HEATING?: NOT HARD AT ALL

## 2022-09-13 NOTE — PROGRESS NOTES
Subjective:      Patient ID: Maryan Phillip is a 78 y.o. male    Chief Complaint   Patient presents with    Flank Pain     Right    Urinary Frequency       HPI    Right flank pain  5 days of right flank pain, increased urine frequency, slight burning on urination. No fever, no hematuria. History of prior kidney stones in 2014. No nausea, vomiting, or diarrhea. As of yesterday the pain has subsided. The frequency has resolved. Current Outpatient Medications on File Prior to Visit   Medication Sig Dispense Refill    sildenafil (VIAGRA) 100 MG tablet Take 1 tablet by mouth as needed for Erectile Dysfunction 20 tablet 2    diclofenac sodium 1 % GEL Apply 2 g topically 4 times daily 1 Tube 0    Vitamin D (CHOLECALCIFEROL) 1000 UNITS CAPS capsule Take 1,000 Units by mouth daily. Saw [place] 450 MG CAPS Take 1 tablet by mouth 2 times daily. Coenzyme Q-10 100 MG CAPS Take 1 tablet by mouth daily. Multiple Vitamin (MULTI VITAMIN MENS PO) Take by mouth daily        No current facility-administered medications on file prior to visit.        No Known Allergies    Past Medical History:   Diagnosis Date    Cephalgia     Hyperlipidemia     Left knee pain      Past Surgical History:   Procedure Laterality Date    COLONOSCOPY  3/2013    normal Dr Augustine Sport repeat 2023    KNEE CARTILAGE SURGERY  1980\"S    LEFT KNEE    PAIN MANAGEMENT PROCEDURE Left 9/2/2020    LEFT C3, C4, C5 AND C6 MEDIAL BRANCH BLOCKS WITH STEROID WITH FLUOROSCOPY (30909, 11712)  #1 performed by Tiera Fierro MD at [address] [place] [address]  1980'S    LEFT    TONSILLECTOMY AND ADENOIDECTOMY  1952     Social History     Socioeconomic History    Marital status:      Spouse name: Not on file    Number of children: Not on file    Years of education: Not on file    Highest education level: Not on file   Occupational History    Not on file   Tobacco Use    Smoking status: Never    Smokeless tobacco: Never   Substance and Sexual Activity    Alcohol use: Yes     Comment: OCCASIONALLY    Drug use: No    Sexual activity: Not on file   Other Topics Concern    Not on file   Social History Narrative    Not on file     Social Determinants of Health     Financial Resource Strain: Low Risk     Difficulty of Paying Living Expenses: Not hard at all   Food Insecurity: No Food Insecurity    Worried About Running Out of Food in the Last Year: Never true    Ran Out of Food in the Last Year: Never true   Transportation Needs: Not on file   Physical Activity: Not on file   Stress: Not on file   Social Connections: Not on file   Intimate Partner Violence: Not on file   Housing Stability: Not on file     Family History   Problem Relation Age of Onset    Cancer Mother     Cancer Father         LIVER     Immunization History   Administered Date(s) Administered    COVID-19, PFIZER PURPLE top, DILUTE for use, (age 15 y+), 30mcg/0.3mL 02/20/2021, 03/13/2021, 12/30/2021    Influenza, FLUAD, (age 72 y+), Adjuvanted, 0.5mL 09/08/2021    Pneumococcal Conjugate 13-valent ([id number]) 05/11/2015    Pneumococcal Polysaccharide ([id number]) 03/22/2013    Tdap (Boostrix, Adacel) 07/08/2011       Review of Systems   Constitutional:  Negative for fatigue, fever and unexpected weight change. HENT:  Negative for hearing loss. Eyes:  Negative for redness and visual disturbance. Respiratory:  Negative for chest tightness and shortness of breath. Cardiovascular:  Negative for chest pain and palpitations. Gastrointestinal:  Negative for abdominal pain and nausea. Genitourinary:  Positive for dysuria, flank pain and frequency. Negative for difficulty urinating, genital sores, hematuria, penile discharge, penile swelling, scrotal swelling and urgency. Musculoskeletal:  Negative for arthralgias, back pain and neck pain. Skin:  Negative for rash and wound. Neurological:  Negative for dizziness and headaches. Hematological:  Negative for adenopathy.  Does not bruise/bleed easily. Psychiatric/Behavioral:  Negative for agitation. The patient is not nervous/anxious. Objective:    Physical Exam  Constitutional:       Appearance: Normal appearance. He is well-developed. Eyes:      Extraocular Movements: Extraocular movements intact. Cardiovascular:      Rate and Rhythm: Normal rate and regular rhythm. Heart sounds: Normal heart sounds. No murmur heard. Pulmonary:      Effort: Pulmonary effort is normal.      Breath sounds: Normal breath sounds. No rales. Abdominal:      General: Abdomen is flat. Bowel sounds are normal. There is no distension. Palpations: Abdomen is soft. There is no mass. Tenderness: There is abdominal tenderness. There is no guarding or rebound. Hernia: No hernia is present. Comments: Right CVA tenderness to palpation   Skin:     Findings: No rash. Neurological:      Mental Status: He is alert and oriented to person, place, and time. Psychiatric:         Mood and Affect: Mood normal.     Vitals:    09/13/22 1351   BP: (!) 140/80   Pulse: 63   Temp: 97.7 °F (36.5 °C)   SpO2: 97%       Assessment and plan       1. Kidney stone  Right flank pain for 5 days. Still some right flank tenderness on palpation. Symptoms have improved in the last 2 days. Check urinalysis and abdominal flatplate. Tylenol if needed for pain. - XR ABDOMEN (2 VIEWS); Future  - Urinalysis;  Future  - Culture, Urine

## 2022-09-13 NOTE — RESULT ENCOUNTER NOTE
His xray shows no kidney stone on the right and one kidney stone on the left, please call and notify patient. details…

## 2022-09-14 LAB
RBC UA: >100 /HPF (ref 0–4)
WBC UA: ABNORMAL /HPF (ref 0–5)

## 2022-09-15 LAB — URINE CULTURE, ROUTINE: NORMAL

## 2022-09-19 ENCOUNTER — TELEPHONE (OUTPATIENT)
Dept: INTERNAL MEDICINE CLINIC | Age: 77
End: 2022-09-19

## 2022-09-19 DIAGNOSIS — R31.9 HEMATURIA, UNSPECIFIED TYPE: Primary | ICD-10-CM

## 2022-09-19 NOTE — TELEPHONE ENCOUNTER
Pt thinks he passed kidney stone or had UTI last week and would like to get labs for urinalysis       Please advise

## 2022-09-19 NOTE — TELEPHONE ENCOUNTER
Pt complains of particles throughout his urine and no pain just wonders if this will pass or should he be concerned. No blood coming out.

## 2022-09-26 ENCOUNTER — TELEPHONE (OUTPATIENT)
Dept: INTERNAL MEDICINE CLINIC | Age: 77
End: 2022-09-26

## 2022-09-26 NOTE — TELEPHONE ENCOUNTER
Spoke with pt , I advised him to call Cristo Moffett MD, Neurology, [facility] for results.

## 2022-09-26 NOTE — TELEPHONE ENCOUNTER
Patient got an MRI done on 9/20/22 and he is calling for results      Celio Egan MD, Neurology, [facility]       Please advise and call

## 2022-11-03 DIAGNOSIS — M25.562 CHRONIC PAIN OF BOTH KNEES: ICD-10-CM

## 2022-11-03 DIAGNOSIS — R31.9 HEMATURIA, UNSPECIFIED TYPE: ICD-10-CM

## 2022-11-03 DIAGNOSIS — R53.82 CHRONIC FATIGUE: ICD-10-CM

## 2022-11-03 DIAGNOSIS — E55.9 VITAMIN D DEFICIENCY: ICD-10-CM

## 2022-11-03 DIAGNOSIS — N52.8 OTHER MALE ERECTILE DYSFUNCTION: ICD-10-CM

## 2022-11-03 DIAGNOSIS — M25.511 CHRONIC RIGHT SHOULDER PAIN: ICD-10-CM

## 2022-11-03 DIAGNOSIS — M26.621 TMJ TENDERNESS, RIGHT: ICD-10-CM

## 2022-11-03 DIAGNOSIS — M25.561 CHRONIC PAIN OF BOTH KNEES: ICD-10-CM

## 2022-11-03 DIAGNOSIS — E78.2 MIXED HYPERLIPIDEMIA: ICD-10-CM

## 2022-11-03 DIAGNOSIS — N20.0 NEPHROLITHIASIS: ICD-10-CM

## 2022-11-03 DIAGNOSIS — G89.29 CHRONIC PAIN OF BOTH KNEES: ICD-10-CM

## 2022-11-03 DIAGNOSIS — G89.29 CHRONIC RIGHT SHOULDER PAIN: ICD-10-CM

## 2022-11-03 DIAGNOSIS — R35.1 NOCTURIA: ICD-10-CM

## 2022-11-03 LAB
A/G RATIO: 2.1 (ref 1.1–2.2)
ALBUMIN SERPL-MCNC: 4.4 G/DL (ref 3.4–5)
ALP BLD-CCNC: 65 U/L (ref 40–129)
ALT SERPL-CCNC: 16 U/L (ref 10–40)
ANION GAP SERPL CALCULATED.3IONS-SCNC: 8 MMOL/L (ref 3–16)
AST SERPL-CCNC: 19 U/L (ref 15–37)
BACTERIA: NORMAL /HPF
BASOPHILS ABSOLUTE: 0 K/UL (ref 0–0.2)
BASOPHILS RELATIVE PERCENT: 0.7 %
BILIRUB SERPL-MCNC: 0.8 MG/DL (ref 0–1)
BILIRUBIN URINE: NEGATIVE
BLOOD, URINE: NEGATIVE
BUN BLDV-MCNC: 20 MG/DL (ref 7–20)
CALCIUM SERPL-MCNC: 9.4 MG/DL (ref 8.3–10.6)
CHLORIDE BLD-SCNC: 103 MMOL/L (ref 99–110)
CHOLESTEROL, TOTAL: 168 MG/DL (ref 0–199)
CLARITY: CLEAR
CO2: 28 MMOL/L (ref 21–32)
COLOR: YELLOW
CREAT SERPL-MCNC: 1.2 MG/DL (ref 0.8–1.3)
EOSINOPHILS ABSOLUTE: 0.3 K/UL (ref 0–0.6)
EOSINOPHILS RELATIVE PERCENT: 5.7 %
EPITHELIAL CELLS, UA: 1 /HPF (ref 0–5)
GFR SERPL CREATININE-BSD FRML MDRD: >60 ML/MIN/{1.73_M2}
GLUCOSE BLD-MCNC: 99 MG/DL (ref 70–99)
GLUCOSE URINE: NEGATIVE MG/DL
HCT VFR BLD CALC: 43 % (ref 40.5–52.5)
HDLC SERPL-MCNC: 44 MG/DL (ref 40–60)
HEMOGLOBIN: 14.7 G/DL (ref 13.5–17.5)
HYALINE CASTS: 0 /LPF (ref 0–8)
KETONES, URINE: ABNORMAL MG/DL
LDL CHOLESTEROL CALCULATED: 109 MG/DL
LEUKOCYTE ESTERASE, URINE: NEGATIVE
LYMPHOCYTES ABSOLUTE: 1.7 K/UL (ref 1–5.1)
LYMPHOCYTES RELATIVE PERCENT: 32.9 %
MCH RBC QN AUTO: 30.2 PG (ref 26–34)
MCHC RBC AUTO-ENTMCNC: 34.2 G/DL (ref 31–36)
MCV RBC AUTO: 88.3 FL (ref 80–100)
MICROSCOPIC EXAMINATION: YES
MONOCYTES ABSOLUTE: 0.6 K/UL (ref 0–1.3)
MONOCYTES RELATIVE PERCENT: 11 %
NEUTROPHILS ABSOLUTE: 2.5 K/UL (ref 1.7–7.7)
NEUTROPHILS RELATIVE PERCENT: 49.7 %
NITRITE, URINE: NEGATIVE
PDW BLD-RTO: 13.9 % (ref 12.4–15.4)
PH UA: 5.5 (ref 5–8)
PLATELET # BLD: 218 K/UL (ref 135–450)
PMV BLD AUTO: 9.7 FL (ref 5–10.5)
POTASSIUM SERPL-SCNC: 4.7 MMOL/L (ref 3.5–5.1)
PROSTATE SPECIFIC ANTIGEN: 1.93 NG/ML (ref 0–4)
PROTEIN UA: ABNORMAL MG/DL
RBC # BLD: 4.86 M/UL (ref 4.2–5.9)
RBC UA: 0 /HPF (ref 0–4)
SODIUM BLD-SCNC: 139 MMOL/L (ref 136–145)
SPECIFIC GRAVITY UA: 1.03 (ref 1–1.03)
TOTAL PROTEIN: 6.5 G/DL (ref 6.4–8.2)
TRIGL SERPL-MCNC: 73 MG/DL (ref 0–150)
TSH REFLEX: 2.65 UIU/ML (ref 0.27–4.2)
URINE REFLEX TO CULTURE: ABNORMAL
URINE TYPE: ABNORMAL
UROBILINOGEN, URINE: 0.2 E.U./DL
VLDLC SERPL CALC-MCNC: 15 MG/DL
WBC # BLD: 5 K/UL (ref 4–11)
WBC UA: 0 /HPF (ref 0–5)

## 2022-11-09 ENCOUNTER — OFFICE VISIT (OUTPATIENT)
Dept: INTERNAL MEDICINE CLINIC | Age: 77
End: 2022-11-09
Payer: MEDICARE

## 2022-11-09 VITALS
HEART RATE: 67 BPM | SYSTOLIC BLOOD PRESSURE: 118 MMHG | OXYGEN SATURATION: 98 % | BODY MASS INDEX: 24.79 KG/M2 | TEMPERATURE: 97.3 F | WEIGHT: 193.2 LBS | HEIGHT: 74 IN | DIASTOLIC BLOOD PRESSURE: 78 MMHG | RESPIRATION RATE: 14 BRPM

## 2022-11-09 DIAGNOSIS — Z00.00 WELL ADULT EXAM: Primary | ICD-10-CM

## 2022-11-09 DIAGNOSIS — M25.562 CHRONIC PAIN OF BOTH KNEES: ICD-10-CM

## 2022-11-09 DIAGNOSIS — Z12.11 COLON CANCER SCREENING: ICD-10-CM

## 2022-11-09 DIAGNOSIS — N20.0 NEPHROLITHIASIS: ICD-10-CM

## 2022-11-09 DIAGNOSIS — G89.29 CHRONIC PAIN OF BOTH KNEES: ICD-10-CM

## 2022-11-09 DIAGNOSIS — G89.29 CHRONIC RIGHT SHOULDER PAIN: ICD-10-CM

## 2022-11-09 DIAGNOSIS — E55.9 VITAMIN D DEFICIENCY: ICD-10-CM

## 2022-11-09 DIAGNOSIS — E78.2 MIXED HYPERLIPIDEMIA: ICD-10-CM

## 2022-11-09 DIAGNOSIS — M25.511 CHRONIC RIGHT SHOULDER PAIN: ICD-10-CM

## 2022-11-09 DIAGNOSIS — Z23 NEED FOR INFLUENZA VACCINATION: ICD-10-CM

## 2022-11-09 DIAGNOSIS — M26.621 TMJ TENDERNESS, RIGHT: ICD-10-CM

## 2022-11-09 DIAGNOSIS — Z00.00 MEDICARE ANNUAL WELLNESS VISIT, SUBSEQUENT: ICD-10-CM

## 2022-11-09 DIAGNOSIS — M25.561 CHRONIC PAIN OF BOTH KNEES: ICD-10-CM

## 2022-11-09 PROCEDURE — G0439 PPPS, SUBSEQ VISIT: HCPCS | Performed by: INTERNAL MEDICINE

## 2022-11-09 PROCEDURE — 1123F ACP DISCUSS/DSCN MKR DOCD: CPT | Performed by: INTERNAL MEDICINE

## 2022-11-09 PROCEDURE — 90694 VACC AIIV4 NO PRSRV 0.5ML IM: CPT | Performed by: INTERNAL MEDICINE

## 2022-11-09 PROCEDURE — G0008 ADMIN INFLUENZA VIRUS VAC: HCPCS | Performed by: INTERNAL MEDICINE

## 2022-11-09 PROCEDURE — 99214 OFFICE O/P EST MOD 30 MIN: CPT | Performed by: INTERNAL MEDICINE

## 2022-11-09 RX ORDER — GABAPENTIN 100 MG/1
100 CAPSULE ORAL NIGHTLY
Qty: 30 CAPSULE | Refills: 1 | Status: SHIPPED | OUTPATIENT
Start: 2022-11-09 | End: 2022-12-09

## 2022-11-09 ASSESSMENT — ENCOUNTER SYMPTOMS
ALLERGIC/IMMUNOLOGIC NEGATIVE: 1
GASTROINTESTINAL NEGATIVE: 1
RESPIRATORY NEGATIVE: 1
EYES NEGATIVE: 1

## 2022-11-09 ASSESSMENT — PATIENT HEALTH QUESTIONNAIRE - PHQ9
SUM OF ALL RESPONSES TO PHQ9 QUESTIONS 1 & 2: 0
SUM OF ALL RESPONSES TO PHQ QUESTIONS 1-9: 0
SUM OF ALL RESPONSES TO PHQ QUESTIONS 1-9: 0
1. LITTLE INTEREST OR PLEASURE IN DOING THINGS: 0
SUM OF ALL RESPONSES TO PHQ QUESTIONS 1-9: 0
2. FEELING DOWN, DEPRESSED OR HOPELESS: 0
SUM OF ALL RESPONSES TO PHQ QUESTIONS 1-9: 0

## 2022-11-09 ASSESSMENT — LIFESTYLE VARIABLES
HOW MANY STANDARD DRINKS CONTAINING ALCOHOL DO YOU HAVE ON A TYPICAL DAY: 1 OR 2
HOW OFTEN DO YOU HAVE A DRINK CONTAINING ALCOHOL: 2-4 TIMES A MONTH

## 2022-11-09 NOTE — PROGRESS NOTES
Subjective:      Patient ID: Flakito Bryant is a 78 y.o. male. HPI  Here today for medicare complete physical and review of chronic problems as listed under assessment and plan,no new c/o feels good       Hyperlipidemia  This is a chronic problem. The current episode started more than 1 year ago. The problem is controlled. Recent lipid tests were reviewed and are normal. There are no known factors aggravating his hyperlipidemia. Pertinent negatives include no myalgias. Current antihyperlipidemic treatment includes diet change, exercise and statins. The current treatment provides significant improvement of lipids. There are no compliance problems. Risk factors for coronary artery disease include dyslipidemia and male sex. No Known Allergies    Current Outpatient Medications   Medication Sig Dispense Refill    gabapentin (NEURONTIN) 100 MG capsule Take 1 capsule by mouth nightly for 30 days. Intended supply: 30 days 30 capsule 1    meloxicam (MOBIC) 7.5 MG tablet TAKE 1 TABLET BY MOUTH ONCE DAILY 90 tablet 1    atorvastatin (LIPITOR) 40 MG tablet TAKE 1 TABLET BY MOUTH  DAILY 90 tablet 1    sildenafil (VIAGRA) 100 MG tablet Take 1 tablet by mouth as needed for Erectile Dysfunction 20 tablet 2    diclofenac sodium 1 % GEL Apply 2 g topically 4 times daily 1 Tube 0    Vitamin D (CHOLECALCIFEROL) 1000 UNITS CAPS capsule Take 1,000 Units by mouth daily. Saw [place] 450 MG CAPS Take 1 tablet by mouth 2 times daily. Coenzyme Q-10 100 MG CAPS Take 1 tablet by mouth daily. Multiple Vitamin (MULTI VITAMIN MENS PO) Take by mouth daily        No current facility-administered medications for this visit.        Past Medical History:   Diagnosis Date    Cephalgia     Hyperlipidemia     Left knee pain        Family History   Problem Relation Age of Onset    Cancer Mother     Cancer Father         LIVER       Past Surgical History:   Procedure Laterality Date    COLONOSCOPY  3/2013    normal Dr Mariela Mckenzie repeat 2023    KNEE CARTILAGE SURGERY  1980\"S    LEFT KNEE    PAIN MANAGEMENT PROCEDURE Left 9/2/2020    LEFT C3, C4, C5 AND C6 MEDIAL BRANCH BLOCKS WITH STEROID WITH FLUOROSCOPY (71379, 63673)  #1 performed by Ryder Larson MD at [address]       Social History     Socioeconomic History    Marital status:      Spouse name: Not on file    Number of children: Not on file    Years of education: Not on file    Highest education level: Not on file   Occupational History    Not on file   Tobacco Use    Smoking status: Never    Smokeless tobacco: Never   Substance and Sexual Activity    Alcohol use: Yes     Comment: OCCASIONALLY    Drug use: No    Sexual activity: Not on file   Other Topics Concern    Not on file   Social History Narrative    Not on file     Social Determinants of Health     Financial Resource Strain: Low Risk     Difficulty of Paying Living Expenses: Not hard at all   Food Insecurity: No Food Insecurity    Worried About Running Out of Food in the Last Year: Never true    [address] in the Last Year: Never true   Transportation Needs: Not on file   Physical Activity: Insufficiently Active    Days of Exercise per Week: 2 days    Minutes of Exercise per Session: 30 min   Stress: Not on file   Social Connections: Not on file   Intimate Partner Violence: Not on file   Housing Stability: Not on file       Review of Systems  Review of Systems   Constitutional:  Positive for unexpected weight change (down a few # ). HENT: Negative. Eyes: Negative. Glasses  Early cataracts    Respiratory: Negative. Cardiovascular: Negative. Gastrointestinal: Negative. Colonoscopy was ok repeat 2023   Endocrine: Negative. Genitourinary:  Positive for frequency (occ nocturia ).         Episode of renal colic as noted prn meds   occ ED symptoms to try Viagra some mild increase in urinary freq    Musculoskeletal: Positive for arthralgias ( L knee pain is getting worse ) and gait problem (L knee  pain on and off c/o ). Negative for myalgias. Skin: Negative. Allergic/Immunologic: Negative. Neurological:  Positive for dizziness (occ sx  occ balance . sx ). Hematological: Negative. Psychiatric/Behavioral: Negative. Objective:   Physical Exam:  Physical Exam  Constitutional:       Appearance: He is well-developed. HENT:      Head: Normocephalic and atraumatic. Right Ear: Tympanic membrane, ear canal and external ear normal.      Left Ear: Tympanic membrane, ear canal and external ear normal.   Eyes:      Extraocular Movements: Extraocular movements intact. Conjunctiva/sclera: Conjunctivae normal.      Pupils: Pupils are equal, round, and reactive to light. Neck:      Thyroid: No thyromegaly. Trachea: No tracheal deviation. Cardiovascular:      Rate and Rhythm: Normal rate and regular rhythm. Pulses: Normal pulses. Heart sounds: Normal heart sounds. Pulmonary:      Effort: Pulmonary effort is normal.      Breath sounds: Normal breath sounds. Abdominal:      General: Abdomen is flat. Bowel sounds are normal.      Palpations: Abdomen is soft. Genitourinary:     Penis: Normal. No tenderness. Prostate: Normal.      Rectum: Normal. Guaiac result negative. Musculoskeletal:         General: Normal range of motion. Cervical back: Normal range of motion and neck supple. Skin:     General: Skin is warm and dry. Neurological:      General: No focal deficit present. Mental Status: He is alert and oriented to person, place, and time. Deep Tendon Reflexes: Reflexes are normal and symmetric. Psychiatric:         Mood and Affect: Mood normal.         Behavior: Behavior normal.         Thought Content:  Thought content normal.       /78 (Site: Right Upper Arm, Position: Sitting, Cuff Size: Medium Adult)   Pulse 67   Temp 97.3 °F (36.3 °C) (Temporal)   Resp 14   Ht 6' 2\" (1.88 m)   Wt 193 lb 3.2 oz (87.6 kg)   SpO2 98%   BMI 24.81 kg/m²       Assessment & Plan:         Hyperlipidemia   Well-controlled, continue current medications and lifestyle modifications recommended Repeat labs in 6 months      Vitamin D deficiency   Continue current meds     Nephrolithiasis  Last episode 3 mo ago  Keep hydrated     Chronic pain of both knees   cont prn NSAIDS    Chronic right shoulder pain   Monitored by specialist- no acute findings meriting change in the plan    Well adult exam   Within normal limits for age- cont to work no ADL issues,immunizations up to date, no depression ,no cognitive impairment  Colonoscopy up to date will do Cologuard now   Eye exam up to date  Exercises as tolerated    Has  living will but does not want resuscitation   Findings and recommendations discussed with Pt     TMJ tenderness, right  On and off C/O W/U was negative  Want to try Gabapentin 100 mg HS      Kwan Carrier  1945    No Known Allergies  Current Outpatient Medications   Medication Sig Dispense Refill    gabapentin (NEURONTIN) 100 MG capsule Take 1 capsule by mouth nightly for 30 days. Intended supply: 30 days 30 capsule 1    meloxicam (MOBIC) 7.5 MG tablet TAKE 1 TABLET BY MOUTH ONCE DAILY 90 tablet 1    atorvastatin (LIPITOR) 40 MG tablet TAKE 1 TABLET BY MOUTH  DAILY 90 tablet 1    sildenafil (VIAGRA) 100 MG tablet Take 1 tablet by mouth as needed for Erectile Dysfunction 20 tablet 2    diclofenac sodium 1 % GEL Apply 2 g topically 4 times daily 1 Tube 0    Vitamin D (CHOLECALCIFEROL) 1000 UNITS CAPS capsule Take 1,000 Units by mouth daily. Saw [place] 450 MG CAPS Take 1 tablet by mouth 2 times daily. Coenzyme Q-10 100 MG CAPS Take 1 tablet by mouth daily. Multiple Vitamin (MULTI VITAMIN MENS PO) Take by mouth daily        No current facility-administered medications for this visit.        Vitals:    11/09/22 0903   BP: 118/78   Site: Right Upper Arm Position: Sitting   Cuff Size: Medium Adult   Pulse: 67   Resp: 14   Temp: 97.3 °F (36.3 °C)   TempSrc: Temporal   SpO2: 98%   Weight: 193 lb 3.2 oz (87.6 kg)   Height: 6' 2\" (1.88 m)     Body mass index is 24.81 kg/m². Wt Readings from Last 3 Encounters:   11/09/22 193 lb 3.2 oz (87.6 kg)   09/13/22 198 lb (89.8 kg)   05/09/22 199 lb 3.2 oz (90.4 kg)     BP Readings from Last 3 Encounters:   11/09/22 118/78   09/13/22 (!) 140/80   05/09/22 122/72       Consultants:   Patient Care Team:  Mac Talavera MD as PCP - General (Internal Medicine)  Mac [facility]  Portia Corbin MD as Consulting Physician (Otolaryngology)    Chief Complaint:   Shante Moran is a 78 y.o. male who presents for Medicare Preventive Physical Examination with Personalized Prevention Plan Services. HPI: Tr review listed chronic problems     Patient Active Problem List   Diagnosis    Hyperlipidemia    Well adult exam    Vitamin D deficiency    Nephrolithiasis    Chronic fatigue    Vertigo    Tingling    Ulnar nerve entrapment at elbow, left    Chronic pain of both knees    Chronic right shoulder pain    Other male erectile dysfunction    TMJ tenderness, right       Mood Disorders Screen:  Risk factors: none  Symptoms:  endorses none, denies depressed mood, difficulty concentrating, difficulty sleeping, reduced interest in activities, and changes in appetite     Safety Assessment:  Functional ability/ADLs:  Difficulty with bathing- no, grooming- no, meals- no, incontinence- no.  Driving- yes. Home safety: Lives with family wife. Number of stairs to enter home: 2, within home: 12 between floors. Risk factors for falls: none. Home environment modifications:  yes - rails . End of Life Planning:  Advanced Directive: has an advanced directive - a copy has been provided.       Preventive Care:  Self-testicular exams: Yes  Last PSA: 1.93, normal  Last colonoscopy: 2013, normal  AAA screening:  no   Last eye exam: 2022, glasses amd S/P cataracts  Exercise: yes  Seatbelt use: yes      Immunization History   Administered Date(s) Administered    COVID-19, PFIZER PURPLE top, DILUTE for use, (age 15 y+), 30mcg/0.3mL 02/20/2021, 03/13/2021, 12/30/2021    Influenza, FLUAD, (age 72 y+), Adjuvanted, 0.5mL 09/08/2021    Pneumococcal Conjugate 13-valent (Fixabza37) 05/11/2015    Pneumococcal Polysaccharide ([id number]) 03/22/2013    Tdap (Boostrix, Adacel) 07/08/2011       Past Medical History:   Diagnosis Date    Cephalgia     Hyperlipidemia     Left knee pain      Past Surgical History:   Procedure Laterality Date    COLONOSCOPY  3/2013    normal Dr Som Rust repeat 2023    KNEE CARTILAGE SURGERY  1980\"S    LEFT KNEE    PAIN MANAGEMENT PROCEDURE Left 9/2/2020    LEFT C3, C4, C5 AND C6 MEDIAL BRANCH BLOCKS WITH STEROID WITH FLUOROSCOPY (93784, 61718)  #1 performed by Artemio Jackson MD at [address]  1980'S    LEFT    TONSILLECTOMY AND [address] [facility]     Family History   Problem Relation Age of Onset    Cancer Mother     Cancer Father         LIVER     Social History     Socioeconomic History    Marital status:      Spouse name: Not on file    Number of children: Not on file    Years of education: Not on file    Highest education level: Not on file   Occupational History    Not on file   Tobacco Use    Smoking status: Never    Smokeless tobacco: Never   Substance and Sexual Activity    Alcohol use: Yes     Comment: OCCASIONALLY    Drug use: No    Sexual activity: Not on file   Other Topics Concern    Not on file   Social History Narrative    Not on file     Social Determinants of Health     Financial Resource Strain: Low Risk     Difficulty of Paying Living Expenses: Not hard at all   Food Insecurity: No Food Insecurity    Worried About Running Out of Food in the Last Year: Never true    Ran Out of Food in the Last Year: Never true   Transportation Needs: Not on file   Physical Activity: Insufficiently Active    Days of Exercise per Week: 2 days    Minutes of Exercise per Session: 30 min   Stress: Not on file   Social Connections: Not on file   Intimate Partner Violence: Not on file   Housing Stability: Not on file     }        Visual Acuity: Corrected:            L  20/25            R 20/20    Cognitive Screening:  Clock drawing test score: 5/5. Mini-mental status exam score: NA. Assessment/Plan:  Cassie Ji was seen today for medicare awv and discuss labs. Diagnoses and all orders for this visit:    Well adult exam    Mixed hyperlipidemia    Vitamin D deficiency    Nephrolithiasis    Chronic pain of both knees    Chronic right shoulder pain    TMJ tenderness, right    Colon cancer screening  -     Fecal DNA Colorectal cancer screening (Cologuard)    Other orders  -     gabapentin (NEURONTIN) 100 MG capsule; Take 1 capsule by mouth nightly for 30 days. Intended supply: 30 days    Medicare Annual Wellness Visit    Rachel Arellano is here for Medicare AWV and Discuss Labs    Assessment & Plan   Well adult exam  Assessment & Plan: Within normal limits for age- cont to work no ADL issues,immunizations up to date, no depression ,no cognitive impairment  Colonoscopy up to date will do Cologuard now   Eye exam up to date  Exercises as tolerated    Has  living will but does not want resuscitation   Findings and recommendations discussed with Pt   Mixed hyperlipidemia  Assessment & Plan:   Well-controlled, continue current medications and lifestyle modifications recommended Repeat labs in 6 months    Vitamin D deficiency  Assessment & Plan:   Continue current meds     Nephrolithiasis  Assessment & Plan:  Last episode 3 mo ago  Keep hydrated   Chronic pain of both knees  Assessment & Plan:   cont prn NSAIDS  Chronic right shoulder pain  Assessment & Plan:   Monitored by specialist- no acute findings meriting change in the plan  TMJ tenderness, right  Assessment & Plan:   On and off C/O W/U was negative  Want to try Gabapentin 100 mg HS   Colon cancer screening  -     Fecal DNA Colorectal cancer screening (Cologuard)    Recommendations for Preventive Services Due: see orders and patient instructions/AVS.  Recommended screening schedule for the next 5-10 years is provided to the patient in written form: see Patient Instructions/AVS.     No follow-ups on file. Subjective   The following acute and/or chronic problems were also addressed today:  Hyperlipidemia   Well-controlled, continue current medications and lifestyle modifications recommended Repeat labs in 6 months      Vitamin D deficiency   Continue current meds     Nephrolithiasis  Last episode 3 mo ago  Keep hydrated     Chronic pain of both knees   cont prn NSAIDS    Chronic right shoulder pain   Monitored by specialist- no acute findings meriting change in the plan    Well adult exam   Within normal limits for age- cont to work no ADL issues,immunizations up to date, no depression ,no cognitive impairment  Colonoscopy up to date will do Cologuard now   Eye exam up to date  Exercises as tolerated    Has  living will but does not want resuscitation   Findings and recommendations discussed with Pt     TMJ tenderness, right  On and off C/O W/U was negative  Want to try Gabapentin 100 mg HS     Patient's complete Health Risk Assessment and screening values have been reviewed and are found in Flowsheets. The following problems were reviewed today and where indicated follow up appointments were made and/or referrals ordered. Positive Risk Factor Screenings with Interventions:               Hearing/Vision:  Do you or your family notice any trouble with your hearing that hasn't been managed with hearing aids?: (!) Yes  Do you have difficulty driving, watching TV, or doing any of your daily activities because of your eyesight?: No  Have you had an eye exam within the past year?: Yes  No results found.   Hearing/Vision Interventions:  Vision concerns:  patient encouraged to make appointment with his/her eye specialist    Safety:  Do you have working smoke detectors?: Yes  Do you have any tripping hazards - loose or unsecured carpets or rugs?: No  Do you have any tripping hazards - clutter in doorways, halls, or stairs?: No  Do you have either shower bars, grab bars, non-slip mats or non-slip surfaces in your shower or bathtub?: (!) No  Do all of your stairways have a railing or banister?: Yes  Do you always fasten your seatbelt when you are in a car?: Yes  Safety Interventions:  Home safety tips provided           Objective   Vitals:    11/09/22 0903   BP: 118/78   Site: Right Upper Arm   Position: Sitting   Cuff Size: Medium Adult   Pulse: 67   Resp: 14   Temp: 97.3 °F (36.3 °C)   TempSrc: Temporal   SpO2: 98%   Weight: 193 lb 3.2 oz (87.6 kg)   Height: 6' 2\" (1.88 m)      Body mass index is 24.81 kg/m². No Known Allergies  Prior to Visit Medications    Medication Sig Taking? Authorizing Provider   gabapentin (NEURONTIN) 100 MG capsule Take 1 capsule by mouth nightly for 30 days. Intended supply: 30 days Yes Myrna Randall MD   meloxicam (MOBIC) 7.5 MG tablet TAKE 1 TABLET BY MOUTH ONCE DAILY Yes Myrna Randall MD   atorvastatin (LIPITOR) 40 MG tablet TAKE 1 TABLET BY MOUTH  DAILY Yes Myrna Randall MD   sildenafil (VIAGRA) 100 MG tablet Take 1 tablet by mouth as needed for Erectile Dysfunction Yes Myrna Randall MD   diclofenac sodium 1 % GEL Apply 2 g topically 4 times daily Yes Myrna Randall MD   Vitamin D (CHOLECALCIFEROL) 1000 UNITS CAPS capsule Take 1,000 Units by mouth daily. Yes Historical Provider, MD [place] 450 MG CAPS Take 1 tablet by mouth 2 times daily. Yes Historical Provider, MD   Coenzyme Q-10 100 MG CAPS Take 1 tablet by mouth daily.    Yes Historical Provider, MD   Multiple Vitamin (MULTI VITAMIN MENS PO) Take by mouth daily  Yes Historical Provider, MD       CareTeam (Including outside providers/suppliers regularly involved in providing care):   Patient Care Team:  Vargas Elizondo MD as PCP - General (Internal Medicine)  Vargas Elizondo MD as PCP - [facility] Empaneled Provider  Jorge Orozco MD as Consulting Physician (Otolaryngology)     Reviewed and updated this visit:  Tobacco  Allergies  Meds  Problems  Med Hx  Surg Hx  Soc Hx  Fam Hx

## 2022-11-09 NOTE — ASSESSMENT & PLAN NOTE
Within normal limits for age- cont to work no ADL issues,immunizations up to date, no depression ,no cognitive impairment  Colonoscopy up to date will do Cologuard now   Eye exam up to date  Exercises as tolerated    Has  living will but does not want resuscitation   Findings and recommendations discussed with Pt

## 2022-11-09 NOTE — PATIENT INSTRUCTIONS
Personalized Preventive Plan for Flakito Bryant - 11/9/2022  Medicare offers a range of preventive health benefits. Some of the tests and screenings are paid in full while other may be subject to a deductible, co-insurance, and/or copay. Some of these benefits include a comprehensive review of your medical history including lifestyle, illnesses that may run in your family, and various assessments and screenings as appropriate. After reviewing your medical record and screening and assessments performed today your provider may have ordered immunizations, labs, imaging, and/or referrals for you. A list of these orders (if applicable) as well as your Preventive Care list are included within your After Visit Summary for your review. Other Preventive Recommendations:    A preventive eye exam performed by an eye specialist is recommended every 1-2 years to screen for glaucoma; cataracts, macular degeneration, and other eye disorders. A preventive dental visit is recommended every 6 months. Try to get at least 150 minutes of exercise per week or 10,000 steps per day on a pedometer . Order or download the FREE \"Exercise & Physical Activity: Your Everyday Guide\" from [employer]. Call 2-535.714.1572 or search [employer] online. You need 2723-9400 mg of calcium and 5965-4889 IU of vitamin D per day. It is possible to meet your calcium requirement with diet alone, but a vitamin D supplement is usually necessary to meet this goal.  When exposed to the sun, use a sunscreen that protects against both UVA and UVB radiation with an SPF of 30 or greater. Reapply every 2 to 3 hours or after sweating, drying off with a towel, or swimming. Always wear a seat belt when traveling in a car. Always wear a helmet when riding a bicycle or motorcycle.

## 2022-11-14 ENCOUNTER — TELEPHONE (OUTPATIENT)
Dept: INTERNAL MEDICINE CLINIC | Age: 77
End: 2022-11-14

## 2022-11-14 NOTE — TELEPHONE ENCOUNTER
Patient called in wanting to discuss the potential side effects of the following medication:    gabapentin (NEURONTIN) 100 MG capsule        Pls call and advise.

## 2022-11-16 NOTE — TELEPHONE ENCOUNTER
Take it at bed time and it is a very low dose (high espinoza is 3200/day ) and it is ok to stop at one time the side effects pertain to larger dosages and when taken for seizures

## 2022-11-16 NOTE — TELEPHONE ENCOUNTER
Spoke to pt he stated he has a number of concerns:  What's the best time to take it? Bedtime? Liver problems/kidney issues/ change in eye sight, trouble breathing, confused, shakiness those are all side effects that her is concerned about. He is wanting to know the likely meyer of the side effects as they are all very negative. Wants to know your take on the side effects? Says once you start taking it you have to  keep taking it because if you come off of it you can have severe consequences. That those can start the first day you stop it? Would he be taking this the rest of his life? If pt were to have side effects when would he expect to see those? A couple days? Few weeks? Does the medication interact with any other medications that he is taking? Sleep aid tablet? Pt takes meloxicam would that interact with this medication in any way.

## 2022-12-05 ENCOUNTER — TELEPHONE (OUTPATIENT)
Dept: INTERNAL MEDICINE CLINIC | Age: 77
End: 2022-12-05

## 2022-12-15 ENCOUNTER — TELEPHONE (OUTPATIENT)
Dept: PHARMACY | Facility: CLINIC | Age: 77
End: 2022-12-15

## 2022-12-15 NOTE — TELEPHONE ENCOUNTER
POPULATION HEALTH CLINICAL PHARMACY: ADHERENCE REVIEW  Identified care gap per [employer]: fills at [facility]: Statin adherence    Last Visit: 22    ASSESSMENT  STATIN ADHERENCE    ATORVASTATIN TAB 40MG last filled on 22 for 90 day supply. Next refill due: 22    Per [facility]:   ATORVASTATIN TAB 40MG last picked up on 22 for 90 day supply. 1 refills remaining. Billed through [facility]     Will process a refill     Lab Results   Component Value Date    CHOL 168 2022    TRIG 73 2022    HDL 44 2022    LDLCALC 109 (H) 2022     ALT   Date Value Ref Range Status   2022 16 10 - 40 U/L Final     AST   Date Value Ref Range Status   2022 19 15 - 37 U/L Final     The 10-year ASCVD risk score (Louann CERDA, et al., 2019) is: 21.5%    Values used to calculate the score:      Age: 78 years      Sex: Male      Is Non- : No      Diabetic: No      Tobacco smoker: No      Systolic Blood Pressure: 958 mmHg      Is BP treated: No      HDL Cholesterol: 44 mg/dL      Total Cholesterol: 168 mg/dL     PLAN  The following are interventions that have been identified:   - Patient overdue refilling atorvastatin and active on home medication list.     No patient out reach planned at this time. Pt appears adherent at this time      No future appointments.     [address] [place] [address]  // Department, toll free [phone], Option 2    For Pharmacy [facility] in place:  No  Recommendation Provided To: Pharmacy: 1  Intervention Detail: Adherence Monitorin  Gap Closed?: No   Intervention Accepted By: Pharmacy: 1  Time Spent (min): 10

## 2022-12-19 RX ORDER — ATORVASTATIN CALCIUM 40 MG/1
TABLET, FILM COATED ORAL
Qty: 90 TABLET | Refills: 1 | Status: SHIPPED | OUTPATIENT
Start: 2022-12-19

## 2022-12-19 RX ORDER — MELOXICAM 7.5 MG/1
TABLET ORAL
Qty: 90 TABLET | Refills: 1 | Status: SHIPPED | OUTPATIENT
Start: 2022-12-19

## 2022-12-19 NOTE — TELEPHONE ENCOUNTER
Pt needs refills of    atorvastatin (LIPITOR) 40 MG tablet    meloxicam (MOBIC) 7.5 MG tablet        [facility] ([facility]) - Harsh Fergusonhuroman [address] [place] [address] [phone] - F [phone]

## 2022-12-20 RX ORDER — GABAPENTIN 100 MG/1
CAPSULE ORAL
Qty: 60 CAPSULE | Refills: 5 | Status: SHIPPED | OUTPATIENT
Start: 2022-12-20 | End: 2023-03-20

## 2023-01-09 ENCOUNTER — TELEPHONE (OUTPATIENT)
Dept: INTERNAL MEDICINE CLINIC | Age: 78
End: 2023-01-09

## 2023-01-09 NOTE — TELEPHONE ENCOUNTER
----- Message from Vinay Roberto sent at 1/9/2023  2:00 PM EST -----  Subject: Message to Provider    QUESTIONS  Information for Provider? Pt is a former [facility] pt and is switching to   Dr. Dell Ayon. He would like to know Dr. Anna Martinez assistants name. Please call or text pt with that information. He has a NTP appt scheduled   for 2/1/23.  ---------------------------------------------------------------------------  --------------  Luba Armenta INFO  [phone]; OK to leave message on voicemail  ---------------------------------------------------------------------------  --------------  SCRIPT ANSWERS  Relationship to Patient?  Self

## 2023-02-02 ENCOUNTER — OFFICE VISIT (OUTPATIENT)
Dept: INTERNAL MEDICINE CLINIC | Age: 78
End: 2023-02-02

## 2023-02-02 VITALS
TEMPERATURE: 97.7 F | SYSTOLIC BLOOD PRESSURE: 122 MMHG | DIASTOLIC BLOOD PRESSURE: 78 MMHG | WEIGHT: 199.6 LBS | BODY MASS INDEX: 25.63 KG/M2

## 2023-02-02 DIAGNOSIS — E55.9 VITAMIN D DEFICIENCY: ICD-10-CM

## 2023-02-02 DIAGNOSIS — E78.2 MIXED HYPERLIPIDEMIA: Primary | ICD-10-CM

## 2023-02-02 DIAGNOSIS — M25.562 CHRONIC PAIN OF BOTH KNEES: ICD-10-CM

## 2023-02-02 DIAGNOSIS — M25.561 CHRONIC PAIN OF BOTH KNEES: ICD-10-CM

## 2023-02-02 DIAGNOSIS — G89.29 CHRONIC PAIN OF BOTH KNEES: ICD-10-CM

## 2023-02-02 DIAGNOSIS — G50.1 ATYPICAL FACIAL PAIN: ICD-10-CM

## 2023-02-02 DIAGNOSIS — G89.29 CHRONIC RIGHT SHOULDER PAIN: ICD-10-CM

## 2023-02-02 DIAGNOSIS — M25.511 CHRONIC RIGHT SHOULDER PAIN: ICD-10-CM

## 2023-02-02 RX ORDER — ATORVASTATIN CALCIUM 40 MG/1
TABLET, FILM COATED ORAL
Qty: 90 TABLET | Refills: 1 | Status: SHIPPED | OUTPATIENT
Start: 2023-02-02

## 2023-02-02 RX ORDER — GABAPENTIN 100 MG/1
CAPSULE ORAL
Qty: 60 CAPSULE | Refills: 5 | Status: SHIPPED | OUTPATIENT
Start: 2023-02-02 | End: 2024-02-02

## 2023-02-02 RX ORDER — MELOXICAM 7.5 MG/1
TABLET ORAL
Qty: 90 TABLET | Refills: 1 | Status: SHIPPED | OUTPATIENT
Start: 2023-02-02

## 2023-02-02 SDOH — ECONOMIC STABILITY: INCOME INSECURITY: HOW HARD IS IT FOR YOU TO PAY FOR THE VERY BASICS LIKE FOOD, HOUSING, MEDICAL CARE, AND HEATING?: NOT HARD AT ALL

## 2023-02-02 SDOH — ECONOMIC STABILITY: FOOD INSECURITY: WITHIN THE PAST 12 MONTHS, YOU WORRIED THAT YOUR FOOD WOULD RUN OUT BEFORE YOU GOT MONEY TO BUY MORE.: NEVER TRUE

## 2023-02-02 SDOH — ECONOMIC STABILITY: FOOD INSECURITY: WITHIN THE PAST 12 MONTHS, THE FOOD YOU BOUGHT JUST DIDN'T LAST AND YOU DIDN'T HAVE MONEY TO GET MORE.: NEVER TRUE

## 2023-02-02 SDOH — ECONOMIC STABILITY: HOUSING INSECURITY
IN THE LAST 12 MONTHS, WAS THERE A TIME WHEN YOU DID NOT HAVE A STEADY PLACE TO SLEEP OR SLEPT IN A SHELTER (INCLUDING NOW)?: NO

## 2023-02-02 ASSESSMENT — ENCOUNTER SYMPTOMS
SINUS PAIN: 0
NAUSEA: 0
COUGH: 0
WHEEZING: 0
ABDOMINAL DISTENTION: 0
BACK PAIN: 0
CHEST TIGHTNESS: 0
SORE THROAT: 0
BLOOD IN STOOL: 0
TROUBLE SWALLOWING: 0
DIARRHEA: 0
VOICE CHANGE: 0
ABDOMINAL PAIN: 0
SINUS PRESSURE: 0
SHORTNESS OF BREATH: 0
CONSTIPATION: 0
VOMITING: 0

## 2023-02-02 ASSESSMENT — PATIENT HEALTH QUESTIONNAIRE - PHQ9
SUM OF ALL RESPONSES TO PHQ QUESTIONS 1-9: 0
SUM OF ALL RESPONSES TO PHQ QUESTIONS 1-9: 0
SUM OF ALL RESPONSES TO PHQ9 QUESTIONS 1 & 2: 0
1. LITTLE INTEREST OR PLEASURE IN DOING THINGS: 0
2. FEELING DOWN, DEPRESSED OR HOPELESS: 0
SUM OF ALL RESPONSES TO PHQ QUESTIONS 1-9: 0
SUM OF ALL RESPONSES TO PHQ QUESTIONS 1-9: 0

## 2023-02-02 NOTE — PROGRESS NOTES
[facility]  Follow-up visit   2023    Juan Crabtree (:  1945) is a 77 y.o. male, here for follow-up:    Chief Complaint   Patient presents with    New Patient        HPI    77-year-old gentleman.  He is retired from real estate and property management.  .  Has 4 daughters.  3 live locally.  He has never smoked.  He occasionally  drinks wine weekly.      Hyperlipidemia  This is a chronic problem. The current episode started more than 1 year ago. The problem is controlled. Recent lipid tests were reviewed and are normal. There are no known factors aggravating his hyperlipidemia. Pertinent negatives include no myalgias. Current antihyperlipidemic treatment includes diet change (avoiding saturated fats), exercise (at home/free weights/aerobics)and statins. The current treatment provides significant improvement of lipids. There are no compliance problems.  Risk factors for coronary artery disease include dyslipidemia and male sex.     Atypical facial pain/paresthesia  Patient was seen by Dr. Mariaelena Luu in 2022.  He has had MRI imaging.  He has been trialed on Gabapentin 100 mg HS. and is not reporting any side effects from the medication.  We had some discussion about increasing it.  I will leave it up to him whether he decides to trial an increase.  He is aware that if he wants to discontinue the medication he needs to taper off.      Chronic pain of both knees  -He has had 2 surgeries on the left knee in the .    -meloxicam is fairly effective.      Chronic right shoulder pain  -Saw Dr. Hutson 3/3/2021.  Reviewed note.    -meloxicam is fairly effective.       ROS  Review of Systems   Constitutional:  Negative for activity change, appetite change, chills, diaphoresis, fatigue, fever and unexpected weight change.   HENT:  Negative for sinus pressure, sinus pain, sore throat, trouble swallowing and voice change.    Eyes:  Negative for visual disturbance.  Respiratory:  Negative for cough, chest tightness, shortness of breath and wheezing. Cardiovascular:  Negative for chest pain, palpitations and leg swelling. Gastrointestinal:  Negative for abdominal distention, abdominal pain, blood in stool, constipation, diarrhea, nausea and vomiting. Endocrine: Negative for polydipsia and polyphagia. Genitourinary:  Negative for decreased urine volume, difficulty urinating, dysuria and urgency. Musculoskeletal:  Positive for arthralgias. Negative for back pain, gait problem, joint swelling and myalgias. Neurological:  Negative for dizziness, seizures, syncope, light-headedness and headaches. Psychiatric/Behavioral:  Negative for agitation, behavioral problems, confusion and suicidal ideas. HISTORIES  Current Outpatient Medications on File Prior to Visit   Medication Sig Dispense Refill    Vitamin D (CHOLECALCIFEROL) 1000 UNITS CAPS capsule Take 1,000 Units by mouth daily. [place] 450 MG CAPS Take 1 tablet by mouth 2 times daily. Coenzyme Q-10 100 MG CAPS Take 1 tablet by mouth daily. Multiple Vitamin (MULTI VITAMIN MENS PO) Take by mouth daily       diclofenac sodium 1 % GEL Apply 2 g topically 4 times daily (Patient not taking: Reported on 2/2/2023) 1 Tube 0     No current facility-administered medications on file prior to visit.       No Known Allergies  Past Medical History:   Diagnosis Date    Cephalgia     Hyperlipidemia     Left knee pain      Patient Active Problem List   Diagnosis    Hyperlipidemia    Vitamin D deficiency    Nephrolithiasis    Chronic fatigue    Vertigo    Tingling    Ulnar nerve entrapment at elbow, left    Chronic pain of both knees    Chronic right shoulder pain    Other male erectile dysfunction    TMJ tenderness, right     Past Surgical History:   Procedure Laterality Date    COLONOSCOPY  3/2013    normal Dr Tyesha Dyer repeat 2023    KNEE CARTILAGE SURGERY  1980\"S    LEFT KNEE    PAIN MANAGEMENT PROCEDURE Left 9/2/2020    LEFT C3, C4, C5 AND C6 MEDIAL BRANCH BLOCKS WITH STEROID WITH FLUOROSCOPY (27869, 87659)  #1 performed by Frank Coyle MD at [address] [facility]     Social History     Socioeconomic History    Marital status:      Spouse name: Not on file    Number of children: Not on file    Years of education: Not on file    Highest education level: Not on file   Occupational History    Not on file   Tobacco Use    Smoking status: Never    Smokeless tobacco: Never   Substance and Sexual Activity    Alcohol use: Yes     Comment: OCCASIONALLY    Drug use: No    Sexual activity: Not on file   Other Topics Concern    Not on file   Social History Narrative    Not on file     Social Determinants of Health     Financial Resource Strain: Low Risk     Difficulty of Paying Living Expenses: Not hard at all   Food Insecurity: No Food Insecurity    Worried About [facility] in the Last Year: Never true    [facility]  [employer] in the Last Year: Never true   Transportation Needs: Unknown    Lack of Transportation (Medical): Not on file    Lack of Transportation (Non-Medical): No   Physical Activity: Insufficiently Active    Days of Exercise per Week: 2 days    Minutes of Exercise per Session: 30 min   Stress: Not on file   Social Connections: Not on file   Intimate Partner Violence: Not on file   Housing Stability: Unknown    Unable to Pay for Housing in the Last Year: Not on file    Number of Places Lived in the Last Year: Not on file    Unstable Housing in the Last Year: No      Family History   Problem Relation Age of Onset    Cancer Mother     Cancer Father         LIVER       PE  Vitals:    02/02/23 1324   BP: 122/78   Temp: 97.7 °F (36.5 °C)   Weight: 199 lb 9.6 oz (90.5 kg)     Estimated body mass index is 25.63 kg/m² as calculated from the following:    Height as of 11/9/22: 6' 2\" (1.88 m).     Weight as of this encounter: 199 lb 9.6 oz (90.5 kg).    Physical Exam  Vitals reviewed. Constitutional:       General: He is not in acute distress. Appearance: Normal appearance. HENT:      Head: Normocephalic and atraumatic. Mouth/Throat:      Pharynx: Oropharynx is clear. Eyes:      Conjunctiva/sclera: Conjunctivae normal.      Pupils: Pupils are equal, round, and reactive to light. Cardiovascular:      Rate and Rhythm: Normal rate and regular rhythm. Pulses: Normal pulses. Heart sounds: Normal heart sounds. Pulmonary:      Effort: Pulmonary effort is normal. No respiratory distress. Breath sounds: Normal breath sounds. No wheezing or rales. Abdominal:      Palpations: Abdomen is soft. Tenderness: There is no abdominal tenderness. There is no rebound. Musculoskeletal:         General: No signs of injury. Normal range of motion. Cervical back: Normal range of motion and neck supple. Skin:     General: Skin is warm and dry. Coloration: Skin is not jaundiced. Neurological:      General: No focal deficit present. Mental Status: He is alert and oriented to person, place, and time. Psychiatric:         Behavior: Behavior normal.         Thought Content: Thought content normal.         Judgment: Judgment normal.       ASSESSMENT/ PLAN:  1. Mixed hyperlipidemia  -Controlled  - atorvastatin (LIPITOR) 40 MG tablet; TAKE 1 TABLET BY MOUTH  DAILY  Dispense: 90 tablet; Refill: 1  - Lipid, Fasting; Future  - Comprehensive Metabolic Panel; Future    2. Vitamin D deficiency  -Is using 1000 international units of vitamin D3 daily  - Vitamin D 25 Hydroxy; Future    3. Atypical facial pain  -I have told him that he can titrate this as necessary.    - gabapentin (NEURONTIN) 100 MG capsule; TAKE 1 CAPSULE BY MOUTH NIGHTLY  Dispense: 60 capsule; Refill: 5    4. Chronic pain of both knees  -He has had 2 surgeries on the left knee in the 1980s.     - meloxicam (MOBIC) 7.5 MG tablet; TAKE 1 TABLET BY MOUTH ONCE DAILY Dispense: 90 tablet; Refill: 1    5. Chronic right shoulder pain  -Saw Dr. Kelsie Albrecht 3/3/2021  - meloxicam (MOBIC) 7.5 MG tablet; TAKE 1 TABLET BY MOUTH ONCE DAILY  Dispense: 90 tablet; Refill: 1     Orders Placed This Encounter   Procedures    Lipid, Fasting    Comprehensive Metabolic Panel    Vitamin D 25 Hydroxy      Orders Placed This Encounter   Medications    atorvastatin (LIPITOR) 40 MG tablet     Sig: TAKE 1 TABLET BY MOUTH  DAILY     Dispense:  90 tablet     Refill:  1    gabapentin (NEURONTIN) 100 MG capsule     Sig: TAKE 1 CAPSULE BY MOUTH NIGHTLY     Dispense:  60 capsule     Refill:  5     Requesting 1 year supply    meloxicam (MOBIC) 7.5 MG tablet     Sig: TAKE 1 TABLET BY MOUTH ONCE DAILY     Dispense:  90 tablet     Refill:  1      Medications Discontinued During This Encounter   Medication Reason    sildenafil (VIAGRA) 100 MG tablet LIST CLEANUP    atorvastatin (LIPITOR) 40 MG tablet REORDER    meloxicam (MOBIC) 7.5 MG tablet REORDER    gabapentin (NEURONTIN) 100 MG capsule REORDER        Return in about 3 months (around 5/2/2023). Donavan Lugo MD    This dictation was generated by voice recognition computer software. Although all attempts are made to edit the dictation for accuracy, there may be errors in the transcription that are not intended.

## 2023-02-27 ENCOUNTER — TELEPHONE (OUTPATIENT)
Dept: INTERNAL MEDICINE CLINIC | Age: 78
End: 2023-02-27

## 2023-02-27 DIAGNOSIS — G50.1 ATYPICAL FACIAL PAIN: ICD-10-CM

## 2023-02-27 RX ORDER — GABAPENTIN 100 MG/1
200 CAPSULE ORAL NIGHTLY
Qty: 180 CAPSULE | Refills: 1 | Status: SHIPPED | OUTPATIENT
Start: 2023-02-27 | End: 2023-08-26

## 2023-02-27 NOTE — TELEPHONE ENCOUNTER
Pt states Dr. Rolf Muro upped his gabapentin but he states the script is still at 100mg so he has been taking 200mg nightly and is completely out. Pt is wondering if he was supposed to be taking 2 per night. Please advise.

## 2023-02-28 NOTE — TELEPHONE ENCOUNTER
[phone] (home)   Pt informed on medication being ordered and how it was ordered and dispense per dr Mario Miss

## 2023-02-28 NOTE — TELEPHONE ENCOUNTER
Is Mr. Anson Crum supposed to be taking 2 capsules of the Gabapentin at night? There are 2 different sigs on the prescription that was sent to the pharmacy.

## 2023-02-28 NOTE — TELEPHONE ENCOUNTER
PT called in and wanted to know if he could go without the medication for a couple of weeks while [place] or if we need to have it called into a local pharmacy or one down where he will be for vacation. Please call and advise, has not heard from anyone. Would like a call.

## 2023-02-28 NOTE — TELEPHONE ENCOUNTER
I ordered a 90-day supply of 200 mg gabapentin nightly. This is going to be dispensed in 180 100 mg tablets. This should be enough to last him for 3 months. There is a refill in place as well.

## 2023-03-27 ENCOUNTER — TELEPHONE (OUTPATIENT)
Dept: INTERNAL MEDICINE CLINIC | Age: 78
End: 2023-03-27

## 2023-03-27 NOTE — TELEPHONE ENCOUNTER
MrWillard Bailey wanted clarification on his Gabapentin dosage. There are 2 sigs one says take 2 nightly and the other says take 1 nightly. With the quantity ordered (180) it seems that it would be take 2 nightly. For a total of 200mg.

## 2023-04-04 ENCOUNTER — TELEPHONE (OUTPATIENT)
Dept: INTERNAL MEDICINE CLINIC | Age: 78
End: 2023-04-04

## 2023-04-04 DIAGNOSIS — M54.30 SCIATICA, UNSPECIFIED LATERALITY: Primary | ICD-10-CM

## 2023-04-04 RX ORDER — PREDNISONE 20 MG/1
40 TABLET ORAL DAILY
Qty: 20 TABLET | Refills: 0 | Status: SHIPPED | OUTPATIENT
Start: 2023-04-04 | End: 2023-04-14

## 2023-04-04 NOTE — TELEPHONE ENCOUNTER
Patient said his sciatica is acting up again.  He is barley getting around    Please advise and call   He wasn't sure if dr Juan Daniel Ramos was going to need to see him or just prescribe some meds for him    Please advise and call

## 2023-04-05 ENCOUNTER — TELEPHONE (OUTPATIENT)
Dept: INTERNAL MEDICINE CLINIC | Age: 78
End: 2023-04-05

## 2023-04-05 NOTE — TELEPHONE ENCOUNTER
Pt would like an xray for the pain he is having with  his left hip/ back of left leg and buttocks area      States the pain is very sharp and would like to receive an xray for it    Please call and advise

## 2023-04-05 NOTE — TELEPHONE ENCOUNTER
He needs an appointment to see what if any imaging is necessary. I would recommend that he try the prednisone in the mean time.

## 2023-04-30 DIAGNOSIS — E78.2 MIXED HYPERLIPIDEMIA: ICD-10-CM

## 2023-04-30 DIAGNOSIS — M25.562 CHRONIC PAIN OF BOTH KNEES: ICD-10-CM

## 2023-04-30 DIAGNOSIS — M25.561 CHRONIC PAIN OF BOTH KNEES: ICD-10-CM

## 2023-04-30 DIAGNOSIS — G89.29 CHRONIC RIGHT SHOULDER PAIN: ICD-10-CM

## 2023-04-30 DIAGNOSIS — G89.29 CHRONIC PAIN OF BOTH KNEES: ICD-10-CM

## 2023-04-30 DIAGNOSIS — M25.511 CHRONIC RIGHT SHOULDER PAIN: ICD-10-CM

## 2023-05-01 RX ORDER — MELOXICAM 7.5 MG/1
TABLET ORAL
Qty: 100 TABLET | Refills: 2 | Status: SHIPPED | OUTPATIENT
Start: 2023-05-01

## 2023-05-01 RX ORDER — ATORVASTATIN CALCIUM 40 MG/1
TABLET, FILM COATED ORAL
Qty: 100 TABLET | Refills: 2 | Status: SHIPPED | OUTPATIENT
Start: 2023-05-01

## 2023-05-02 ENCOUNTER — HOSPITAL ENCOUNTER (OUTPATIENT)
Dept: MRI IMAGING | Age: 78
Discharge: HOME | End: 2023-05-02
Payer: MEDICARE

## 2023-05-02 DIAGNOSIS — M54.42 ACUTE LEFT-SIDED LOW BACK PAIN WITH LEFT-SIDED SCIATICA: ICD-10-CM

## 2023-05-02 PROCEDURE — 72148 MRI LUMBAR SPINE W/O DYE: CPT

## 2023-05-02 NOTE — RESULT ENCOUNTER NOTE
Please inform Mr. Cece Pearce to reach the patient on the phone. Patient has severe narrowing of the spinal canal and compression of exiting nerve roots at the level of L4-L5. I believe its in his best interest for him to see the neurosurgeon we have consulted (Dr. Pablo Fernandez).

## 2023-05-07 DIAGNOSIS — G50.1 ATYPICAL FACIAL PAIN: ICD-10-CM

## 2023-05-09 RX ORDER — GABAPENTIN 100 MG/1
CAPSULE ORAL
Qty: 200 CAPSULE | Refills: 0 | Status: SHIPPED | OUTPATIENT
Start: 2023-05-09 | End: 2023-08-17

## 2023-05-25 ENCOUNTER — TELEPHONE (OUTPATIENT)
Dept: INTERNAL MEDICINE CLINIC | Age: 78
End: 2023-05-25

## 2023-05-25 DIAGNOSIS — M25.561 CHRONIC PAIN OF BOTH KNEES: Primary | ICD-10-CM

## 2023-05-25 DIAGNOSIS — G89.29 CHRONIC PAIN OF BOTH KNEES: Primary | ICD-10-CM

## 2023-05-25 DIAGNOSIS — M25.562 CHRONIC PAIN OF BOTH KNEES: Primary | ICD-10-CM

## 2023-05-25 NOTE — TELEPHONE ENCOUNTER
Pt is requesting a new referral to Dr. Marlena Bolden. His knee has been acting up again and would like to see him. Please call and advise.

## 2023-05-25 NOTE — TELEPHONE ENCOUNTER
I spoke with Mr. Leela Arellano and the doctor he wants to see is Dr. Claudia Geano at [facility]. I will send that referral for him.

## 2023-05-26 DIAGNOSIS — E78.2 MIXED HYPERLIPIDEMIA: ICD-10-CM

## 2023-05-26 DIAGNOSIS — E55.9 VITAMIN D DEFICIENCY: ICD-10-CM

## 2023-05-26 LAB
25(OH)D3 SERPL-MCNC: 70.4 NG/ML
ALBUMIN SERPL-MCNC: 4.2 G/DL (ref 3.4–5)
ALBUMIN/GLOB SERPL: 1.6 {RATIO} (ref 1.1–2.2)
ALP SERPL-CCNC: 74 U/L (ref 40–129)
ALT SERPL-CCNC: 14 U/L (ref 10–40)
ANION GAP SERPL CALCULATED.3IONS-SCNC: 12 MMOL/L (ref 3–16)
AST SERPL-CCNC: 18 U/L (ref 15–37)
BILIRUB SERPL-MCNC: 0.6 MG/DL (ref 0–1)
BUN SERPL-MCNC: 21 MG/DL (ref 7–20)
CALCIUM SERPL-MCNC: 9.2 MG/DL (ref 8.3–10.6)
CHLORIDE SERPL-SCNC: 103 MMOL/L (ref 99–110)
CHOLEST SERPL-MCNC: 159 MG/DL (ref 0–199)
CO2 SERPL-SCNC: 26 MMOL/L (ref 21–32)
CREAT SERPL-MCNC: 1.1 MG/DL (ref 0.8–1.3)
GFR SERPLBLD CREATININE-BSD FMLA CKD-EPI: >60 ML/MIN/{1.73_M2}
GLUCOSE SERPL-MCNC: 98 MG/DL (ref 70–99)
HDLC SERPL-MCNC: 47 MG/DL (ref 40–60)
LDL CHOLESTEROL CALCULATED: 95 MG/DL
POTASSIUM SERPL-SCNC: 4.6 MMOL/L (ref 3.5–5.1)
PROT SERPL-MCNC: 6.9 G/DL (ref 6.4–8.2)
SODIUM SERPL-SCNC: 141 MMOL/L (ref 136–145)
TRIGL SERPL-MCNC: 85 MG/DL (ref 0–150)
VLDLC SERPL CALC-MCNC: 17 MG/DL

## 2023-06-22 ENCOUNTER — TELEPHONE (OUTPATIENT)
Dept: INTERNAL MEDICINE CLINIC | Age: 78
End: 2023-06-22

## 2023-06-22 DIAGNOSIS — M54.42 ACUTE LEFT-SIDED LOW BACK PAIN WITH LEFT-SIDED SCIATICA: Primary | ICD-10-CM

## 2023-06-22 RX ORDER — PREDNISONE 20 MG/1
40 TABLET ORAL DAILY
Qty: 20 TABLET | Refills: 0 | Status: SHIPPED | OUTPATIENT
Start: 2023-06-22 | End: 2023-07-02

## 2023-06-22 NOTE — TELEPHONE ENCOUNTER
Pt would like to speak with Dash and update them on his sciatic nerve situation. Pt said that he is in a lot of pain and would like a medication to help with pain as well.   Please call ASAP no

## 2023-07-03 DIAGNOSIS — M54.42 ACUTE LEFT-SIDED LOW BACK PAIN WITH LEFT-SIDED SCIATICA: ICD-10-CM

## 2023-07-03 RX ORDER — PREDNISONE 20 MG/1
40 TABLET ORAL DAILY
Qty: 20 TABLET | Refills: 0 | OUTPATIENT
Start: 2023-07-03 | End: 2023-07-13

## 2023-07-03 NOTE — TELEPHONE ENCOUNTER
Mr. Petrona Daniel is requesting another round of Prednisone. He's feeling a little better but not completely. Please advise.

## 2023-07-04 DIAGNOSIS — M54.42 ACUTE LEFT-SIDED LOW BACK PAIN WITH LEFT-SIDED SCIATICA: ICD-10-CM

## 2023-07-05 DIAGNOSIS — G50.1 ATYPICAL FACIAL PAIN: ICD-10-CM

## 2023-07-05 RX ORDER — GABAPENTIN 100 MG/1
200 CAPSULE ORAL DAILY
Qty: 180 CAPSULE | Refills: 0 | Status: SHIPPED | OUTPATIENT
Start: 2023-07-05 | End: 2023-10-03

## 2023-07-05 RX ORDER — PREDNISONE 20 MG/1
40 TABLET ORAL DAILY
Qty: 20 TABLET | Refills: 0 | OUTPATIENT
Start: 2023-07-05 | End: 2023-07-15

## 2023-07-05 NOTE — TELEPHONE ENCOUNTER
Patient needs refill:    gabapentin (NEURONTIN) 100 MG capsule        [facility] ([address] [place] [address]) - [address] [phone] - f [phone]      Pls advise.

## 2023-07-19 ENCOUNTER — TELEPHONE (OUTPATIENT)
Dept: INTERNAL MEDICINE CLINIC | Age: 78
End: 2023-07-19

## 2023-07-19 NOTE — TELEPHONE ENCOUNTER
Patient was prescribed gabapentin and is now experiencing swelling in left foot, blurriness in vision and also feels that his brain isn't as sharp. Patient has been noticing this over the last couple of weeks. Patient would like to know what to do regarding this. Pls call and advise.

## 2023-08-02 ENCOUNTER — TELEPHONE (OUTPATIENT)
Dept: INTERNAL MEDICINE CLINIC | Age: 78
End: 2023-08-02

## 2023-08-02 NOTE — TELEPHONE ENCOUNTER
----- Message from Alicia Main sent at 8/2/2023 10:51 AM EDT -----  Subject: Appointment Request    Reason for Call: Established Patient Appointment needed: Routine Pre-Op    QUESTIONS    Reason for appointment request? No appointments available during search     Additional Information for Provider? Patient needs to have a pre-op before   8/23 as he has a Laminectomy on that date. Patient would like to do his   labs as soon as possible. Please call patient and let him know when he can   have his pre-op and if the orders for labs have been placed.  Please also   email response as patient wants to be contacted both via phone and email.  ---------------------------------------------------------------------------  --------------  [address]  [phone]; OK to leave message on voicemail  ---------------------------------------------------------------------------  --------------  SCRIPT ANSWERS

## 2023-08-03 ENCOUNTER — OFFICE VISIT (OUTPATIENT)
Dept: FAMILY MEDICINE CLINIC | Age: 78
End: 2023-08-03
Payer: MEDICARE

## 2023-08-03 VITALS
HEART RATE: 78 BPM | WEIGHT: 193 LBS | OXYGEN SATURATION: 96 % | HEIGHT: 74 IN | SYSTOLIC BLOOD PRESSURE: 120 MMHG | BODY MASS INDEX: 24.77 KG/M2 | DIASTOLIC BLOOD PRESSURE: 84 MMHG | TEMPERATURE: 98.6 F

## 2023-08-03 DIAGNOSIS — Z01.818 PREOP EXAMINATION: ICD-10-CM

## 2023-08-03 DIAGNOSIS — M48.062 SPINAL STENOSIS OF LUMBAR REGION WITH NEUROGENIC CLAUDICATION: ICD-10-CM

## 2023-08-03 DIAGNOSIS — Z01.811 PRE-OP CHEST EXAM: Primary | ICD-10-CM

## 2023-08-03 DIAGNOSIS — E78.2 MIXED HYPERLIPIDEMIA: ICD-10-CM

## 2023-08-03 PROCEDURE — 1123F ACP DISCUSS/DSCN MKR DOCD: CPT | Performed by: NURSE PRACTITIONER

## 2023-08-03 PROCEDURE — 93000 ELECTROCARDIOGRAM COMPLETE: CPT | Performed by: NURSE PRACTITIONER

## 2023-08-03 PROCEDURE — 99214 OFFICE O/P EST MOD 30 MIN: CPT | Performed by: NURSE PRACTITIONER

## 2023-08-03 ASSESSMENT — ENCOUNTER SYMPTOMS
BACK PAIN: 1
CONSTIPATION: 0
DIARRHEA: 0
WHEEZING: 0
SINUS PAIN: 0
COUGH: 0
SHORTNESS OF BREATH: 0
ABDOMINAL PAIN: 0
SINUS PRESSURE: 0
COLOR CHANGE: 0

## 2023-08-03 NOTE — ASSESSMENT & PLAN NOTE
Perioperative risk related to the patient's upcoming surgery is considered low. he is cleared for surgery.   Pre-op exam was completed on 8/3/23 12:47 PM.

## 2023-09-02 PROBLEM — Z01.818 PREOP EXAMINATION: Status: RESOLVED | Noted: 2023-08-03 | Resolved: 2023-09-02

## 2023-09-05 DIAGNOSIS — G50.1 ATYPICAL FACIAL PAIN: ICD-10-CM

## 2023-09-06 RX ORDER — GABAPENTIN 100 MG/1
200 CAPSULE ORAL DAILY
Qty: 180 CAPSULE | Refills: 1 | Status: SHIPPED | OUTPATIENT
Start: 2023-09-06 | End: 2024-03-04

## 2023-09-26 ENCOUNTER — TELEPHONE (OUTPATIENT)
Dept: INTERNAL MEDICINE CLINIC | Age: 78
End: 2023-09-26

## 2023-09-26 NOTE — TELEPHONE ENCOUNTER
----- Message from [address] [place] [address] sent at 9/25/2023 10:01 AM EDT -----  Subject: Appointment Request    Reason for Call: Established Patient Appointment needed: Routine Pre-Op    QUESTIONS    Reason for appointment request? Available appointments did not meet   patient need     Additional Information for Provider? Pt has appt on 10/03/2023 for pre op   but wants to reschedule to later in Oct because surgery isn't until   11/03/2023.  Please call pt with appt.  ---------------------------------------------------------------------------  --------------  Neema Morris INFO  [phone]; OK to leave message on voicemail  ---------------------------------------------------------------------------  --------------  SCRIPT ANSWERS

## 2023-10-17 ENCOUNTER — TELEPHONE (OUTPATIENT)
Dept: INTERNAL MEDICINE CLINIC | Age: 78
End: 2023-10-17

## 2023-10-17 NOTE — TELEPHONE ENCOUNTER
Patient called, Dr. Ingrid Young is not doing any knee surgeries at this time. He was  replaced by Dr. Mabel Griggs in the same [facility] group. Dr. Betty Rodriguez requires anyone over 79 to get a cardiologist examination prior to his surgeries. Please provide a referral to a cardiologist.      He does not have a new surgery date yet. He may have to change the pre op that is scheduled for 10/23/23 if he cannot schedule the surgery within the 30 day requirement. He is meeting with Dr. Richa Shabazz  on [address] 10/24/23. He will know more then. .. Please advise when the referral for the cardiologist is completed.

## 2023-10-19 NOTE — TELEPHONE ENCOUNTER
Number given pre-op appointment canceled will call back to reschedule once her has a new surgery date

## 2023-10-26 NOTE — PROGRESS NOTES
[facility]     Outpatient Cardiology         Patient Name:  Billie Antunez  Requesting Physician: No admitting provider for patient encounter. Primary Care Physician: Pao Macias MD    Reason for Consultation/Chief Complaint:   Chief Complaint   Patient presents with    Pre-op Exam     History of Present Illness:    HPI     Billie Antunez a 70 y.o. male with PMH of HLD,  Spinal Stenosis,   Here for surgical risk assessment for knee surgery 11/3/23, Dr Roma Franco, referred by PCP Pao Macias MD    HLD, Lipitor 40 mg, no side effects  From preop cardiac perspective he is doing okay, able to perform more than 4 METS on a regular basis, fairly active, his EKG today shows normal sinus rhythm without ischemic changes. Overall no symptom concerning for CAD or heart failure. PMH  Past Medical History:   Diagnosis Date    Cephalgia     Hyperlipidemia     Left knee pain        PSH  Past Surgical History:   Procedure Laterality Date    COLONOSCOPY  3/2013    normal Dr Calista Richardson repeat 2023    KNEE CARTILAGE SURGERY  1980\"S    LEFT KNEE    PAIN MANAGEMENT PROCEDURE Left 9/2/2020    LEFT C3, C4, C5 AND C6 MEDIAL BRANCH BLOCKS WITH STEROID WITH FLUOROSCOPY (40020, 26934)  #1 performed by Steward Meigs, MD at [address]        Social HIstory  Social History     Tobacco Use    Smoking status: Never    Smokeless tobacco: Never   Substance Use Topics    Alcohol use: Yes     Comment: OCCASIONALLY    Drug use: No       Family History  Family History   Problem Relation Age of Onset    Cancer Mother     Cancer Father         LIVER       Allergies   No Known Allergies    Medications:     Home Medications:  Were reviewed and are listed in nursing record. and/or listed below    Prior to Admission medications    Medication Sig Start Date End Date Taking?  Authorizing Provider   atorvastatin (LIPITOR) 40 MG tablet TAKE 1 TABLET

## 2023-10-31 ENCOUNTER — OFFICE VISIT (OUTPATIENT)
Dept: CARDIOLOGY CLINIC | Age: 78
End: 2023-10-31
Payer: MEDICARE

## 2023-10-31 ENCOUNTER — TELEPHONE (OUTPATIENT)
Dept: INTERNAL MEDICINE CLINIC | Age: 78
End: 2023-10-31

## 2023-10-31 VITALS
HEART RATE: 61 BPM | BODY MASS INDEX: 25.04 KG/M2 | WEIGHT: 195 LBS | DIASTOLIC BLOOD PRESSURE: 74 MMHG | SYSTOLIC BLOOD PRESSURE: 126 MMHG

## 2023-10-31 DIAGNOSIS — R07.9 CHEST PAIN, UNSPECIFIED TYPE: ICD-10-CM

## 2023-10-31 DIAGNOSIS — Z01.810 PREOP CARDIOVASCULAR EXAM: ICD-10-CM

## 2023-10-31 DIAGNOSIS — E78.2 MIXED HYPERLIPIDEMIA: ICD-10-CM

## 2023-10-31 DIAGNOSIS — E55.9 VITAMIN D DEFICIENCY: Primary | ICD-10-CM

## 2023-10-31 PROCEDURE — 99203 OFFICE O/P NEW LOW 30 MIN: CPT | Performed by: INTERNAL MEDICINE

## 2023-10-31 PROCEDURE — 93000 ELECTROCARDIOGRAM COMPLETE: CPT | Performed by: INTERNAL MEDICINE

## 2023-10-31 PROCEDURE — 1123F ACP DISCUSS/DSCN MKR DOCD: CPT | Performed by: INTERNAL MEDICINE

## 2023-10-31 RX ORDER — ACETAMINOPHEN 500 MG
TABLET ORAL
COMMUNITY

## 2023-10-31 ASSESSMENT — ENCOUNTER SYMPTOMS
ABDOMINAL PAIN: 0
ABDOMINAL DISTENTION: 0
BLOOD IN STOOL: 0
COLOR CHANGE: 0
FACIAL SWELLING: 0
SHORTNESS OF BREATH: 0
BACK PAIN: 0
EYE DISCHARGE: 0
WHEEZING: 0
VOMITING: 0
COUGH: 0
CHEST TIGHTNESS: 0

## 2023-10-31 NOTE — TELEPHONE ENCOUNTER
Will make appt. Tomorrow  APPT.  MADE FOR 11/9/23 3:15 Left message on machine  ADVISED TO MUNIRA BACK IF YOU CAN NOT MAKE APPT

## 2023-10-31 NOTE — TELEPHONE ENCOUNTER
Patient is requesting a pre-op appointment for left knee replacement. Surgery is on 11/14/23 and being done by Dr. Asya Chaudhari.     Please call and advise [phone]

## 2023-10-31 NOTE — ASSESSMENT & PLAN NOTE
Okay to proceed with planned surgery without further testing. EKG normal sinus rhythm, no ischemic changes.   Overall low risk

## 2023-11-01 ENCOUNTER — TELEPHONE (OUTPATIENT)
Dept: INTERNAL MEDICINE CLINIC | Age: 78
End: 2023-11-01

## 2023-11-01 NOTE — TELEPHONE ENCOUNTER
----- Message from Nataly Sibley sent at 11/1/2023 12:14 PM EDT -----  Subject: Message to Provider    QUESTIONS  Information for Provider? Pt is calling back to schedule his pre-op appt   for left knee replacement on 11/14/23. Please call pt to schedule.  ---------------------------------------------------------------------------  --------------  Oksana Clark INFO  [phone]; OK to leave message on voicemail  ---------------------------------------------------------------------------  --------------  SCRIPT ANSWERS  Relationship to Patient?  Self

## 2023-11-02 ENCOUNTER — TELEPHONE (OUTPATIENT)
Dept: INTERNAL MEDICINE CLINIC | Age: 78
End: 2023-11-02

## 2023-11-03 DIAGNOSIS — Z00.00 PREVENTATIVE HEALTH CARE: Primary | ICD-10-CM

## 2023-11-08 DIAGNOSIS — Z00.00 PREVENTATIVE HEALTH CARE: ICD-10-CM

## 2023-11-08 LAB
ALBUMIN SERPL-MCNC: 4.6 G/DL (ref 3.4–5)
ALBUMIN/GLOB SERPL: 2 {RATIO} (ref 1.1–2.2)
ALP SERPL-CCNC: 72 U/L (ref 40–129)
ALT SERPL-CCNC: 13 U/L (ref 10–40)
ANION GAP SERPL CALCULATED.3IONS-SCNC: 9 MMOL/L (ref 3–16)
AST SERPL-CCNC: 16 U/L (ref 15–37)
BILIRUB SERPL-MCNC: 1 MG/DL (ref 0–1)
BUN SERPL-MCNC: 20 MG/DL (ref 7–20)
CALCIUM SERPL-MCNC: 9.5 MG/DL (ref 8.3–10.6)
CHLORIDE SERPL-SCNC: 102 MMOL/L (ref 99–110)
CHOLEST SERPL-MCNC: 179 MG/DL (ref 0–199)
CO2 SERPL-SCNC: 30 MMOL/L (ref 21–32)
CREAT SERPL-MCNC: 1.2 MG/DL (ref 0.8–1.3)
GFR SERPLBLD CREATININE-BSD FMLA CKD-EPI: >60 ML/MIN/{1.73_M2}
GLUCOSE SERPL-MCNC: 101 MG/DL (ref 70–99)
HDLC SERPL-MCNC: 57 MG/DL (ref 40–60)
LDL CHOLESTEROL CALCULATED: 104 MG/DL
POTASSIUM SERPL-SCNC: 4.5 MMOL/L (ref 3.5–5.1)
PROT SERPL-MCNC: 6.9 G/DL (ref 6.4–8.2)
SODIUM SERPL-SCNC: 141 MMOL/L (ref 136–145)
TRIGL SERPL-MCNC: 91 MG/DL (ref 0–150)
TSH SERPL DL<=0.005 MIU/L-ACNC: 2.7 UIU/ML (ref 0.27–4.2)
VLDLC SERPL CALC-MCNC: 18 MG/DL

## 2023-11-09 ENCOUNTER — TELEPHONE (OUTPATIENT)
Dept: CARDIOLOGY CLINIC | Age: 78
End: 2023-11-09

## 2023-11-09 ENCOUNTER — OFFICE VISIT (OUTPATIENT)
Dept: INTERNAL MEDICINE CLINIC | Age: 78
End: 2023-11-09
Payer: MEDICARE

## 2023-11-09 VITALS
HEART RATE: 73 BPM | SYSTOLIC BLOOD PRESSURE: 122 MMHG | HEIGHT: 74 IN | OXYGEN SATURATION: 98 % | DIASTOLIC BLOOD PRESSURE: 74 MMHG | BODY MASS INDEX: 24.77 KG/M2 | TEMPERATURE: 97.4 F | WEIGHT: 193 LBS

## 2023-11-09 DIAGNOSIS — Z01.818 PRE-OP EVALUATION: Primary | ICD-10-CM

## 2023-11-09 PROCEDURE — 1123F ACP DISCUSS/DSCN MKR DOCD: CPT | Performed by: HOSPITALIST

## 2023-11-09 PROCEDURE — 99214 OFFICE O/P EST MOD 30 MIN: CPT | Performed by: HOSPITALIST

## 2023-11-09 RX ORDER — MECLIZINE HCL 12.5 MG/1
12.5 TABLET ORAL 3 TIMES DAILY PRN
Qty: 30 TABLET | Refills: 0 | Status: SHIPPED | OUTPATIENT
Start: 2023-11-09 | End: 2023-11-19

## 2023-11-09 RX ORDER — DULOXETIN HYDROCHLORIDE 30 MG/1
CAPSULE, DELAYED RELEASE ORAL
COMMUNITY
Start: 2023-11-09

## 2023-11-09 RX ORDER — OXYCODONE HYDROCHLORIDE 5 MG/1
TABLET ORAL
COMMUNITY
Start: 2023-11-09

## 2023-11-09 RX ORDER — PREDNISONE 10 MG/1
TABLET ORAL
COMMUNITY
Start: 2023-11-09

## 2023-11-09 RX ORDER — CEPHALEXIN 500 MG/1
CAPSULE ORAL
COMMUNITY
Start: 2023-11-09

## 2023-11-09 NOTE — TELEPHONE ENCOUNTER
Octaviano Car from [address] [place] [address] called requesting the EKG tracings from 10/31.      Fax: [phone]

## 2023-11-09 NOTE — PROGRESS NOTES
[facility]  Preoperative visit   11/9/2023    Patient is here for preop evaluation at the request of Dr. Parris Swanson for Left TKR . He is scheduled for surgery on 11/14/2023. Functional Assessment:  Exercise tolerance: 6.8 METS using the DASI calculator   Denies any current chest pain or discomfort, sob or SANDRA, orthopnea, PND or leg swelling. Medications: reviewed, Over the counter (NSAIDs) use: None. Cardiac Risk:  High-risk Surgery: N / Hx of ischemic heart disease: N / Hx of CHF:N / Hx of CVA:N / Pre-op insulin: N / Pre-op creatinine >2.0: N (last cr 1.2 11/8/2023)    GUY Screen:  Snore loudly? N /  Feel tired/fatigued during the day? N  /  Stop breathing while sleeping? N / High blood pressure? N / Morning HA? N    History of surgery/ anesthesia:  - No hx of complications, anesthesia reaction, bleeding, bruising, clots  - No family Hx of reactions to anesthesia/ bleeding/clots  - No loose teeth/ dentures      - Support systems after surgery Good  - Smoking/ alcohol/drugs Never smoker. 1-2 alcohol drinks weekly. - Complicating medical diseases are currently well controlled.  Yes      Problem List  Patient Active Problem List   Diagnosis    Mixed hyperlipidemia    Vitamin D deficiency    Nephrolithiasis    Chronic fatigue    Vertigo    Tingling    Ulnar nerve entrapment at elbow, left    Chronic pain of both knees    Chronic right shoulder pain    Other male erectile dysfunction    TMJ tenderness, right    Spinal stenosis of lumbar region with neurogenic claudication    Preop cardiovascular exam       Medical and Surgical History  Past Medical History:   Diagnosis Date    Cephalgia     Hyperlipidemia     Left knee pain      Past Surgical History:   Procedure Laterality Date    COLONOSCOPY  3/2013    normal Dr Cristo Sullivan repeat 2023    KNEE CARTILAGE SURGERY  1980\"S    LEFT KNEE    PAIN MANAGEMENT PROCEDURE Left 9/2/2020    LEFT C3, C4, C5 AND C6 MEDIAL BRANCH BLOCKS WITH STEROID WITH

## 2023-11-30 PROBLEM — Z01.810 PREOP CARDIOVASCULAR EXAM: Status: RESOLVED | Noted: 2023-10-31 | Resolved: 2023-11-30

## 2023-12-11 ENCOUNTER — OFFICE VISIT (OUTPATIENT)
Dept: INTERNAL MEDICINE CLINIC | Age: 78
End: 2023-12-11
Payer: MEDICARE

## 2023-12-11 VITALS
WEIGHT: 192 LBS | HEART RATE: 72 BPM | TEMPERATURE: 97.7 F | DIASTOLIC BLOOD PRESSURE: 89 MMHG | SYSTOLIC BLOOD PRESSURE: 132 MMHG | HEIGHT: 74 IN | OXYGEN SATURATION: 98 % | BODY MASS INDEX: 24.64 KG/M2

## 2023-12-11 DIAGNOSIS — G89.29 CHRONIC RIGHT SHOULDER PAIN: ICD-10-CM

## 2023-12-11 DIAGNOSIS — Z23 NEED FOR INFLUENZA VACCINATION: ICD-10-CM

## 2023-12-11 DIAGNOSIS — E78.2 MIXED HYPERLIPIDEMIA: ICD-10-CM

## 2023-12-11 DIAGNOSIS — M25.561 CHRONIC PAIN OF BOTH KNEES: ICD-10-CM

## 2023-12-11 DIAGNOSIS — R53.82 CHRONIC FATIGUE: ICD-10-CM

## 2023-12-11 DIAGNOSIS — G89.29 CHRONIC PAIN OF BOTH KNEES: ICD-10-CM

## 2023-12-11 DIAGNOSIS — M25.511 CHRONIC RIGHT SHOULDER PAIN: ICD-10-CM

## 2023-12-11 DIAGNOSIS — Z00.00 MEDICARE ANNUAL WELLNESS VISIT, SUBSEQUENT: Primary | ICD-10-CM

## 2023-12-11 DIAGNOSIS — M25.562 CHRONIC PAIN OF BOTH KNEES: ICD-10-CM

## 2023-12-11 PROCEDURE — 1123F ACP DISCUSS/DSCN MKR DOCD: CPT | Performed by: HOSPITALIST

## 2023-12-11 PROCEDURE — 90694 VACC AIIV4 NO PRSRV 0.5ML IM: CPT | Performed by: HOSPITALIST

## 2023-12-11 PROCEDURE — 99214 OFFICE O/P EST MOD 30 MIN: CPT | Performed by: HOSPITALIST

## 2023-12-11 PROCEDURE — G0008 ADMIN INFLUENZA VIRUS VAC: HCPCS | Performed by: HOSPITALIST

## 2023-12-11 PROCEDURE — G0439 PPPS, SUBSEQ VISIT: HCPCS | Performed by: HOSPITALIST

## 2023-12-11 RX ORDER — ATORVASTATIN CALCIUM 40 MG/1
40 TABLET, FILM COATED ORAL DAILY
Qty: 100 TABLET | Refills: 2 | Status: SHIPPED | OUTPATIENT
Start: 2023-12-11

## 2023-12-11 RX ORDER — MELOXICAM 7.5 MG/1
7.5 TABLET ORAL DAILY
Qty: 100 TABLET | Refills: 2 | Status: SHIPPED | OUTPATIENT
Start: 2023-12-11

## 2023-12-11 ASSESSMENT — PATIENT HEALTH QUESTIONNAIRE - PHQ9
SUM OF ALL RESPONSES TO PHQ QUESTIONS 1-9: 0
2. FEELING DOWN, DEPRESSED OR HOPELESS: 0
1. LITTLE INTEREST OR PLEASURE IN DOING THINGS: 0
SUM OF ALL RESPONSES TO PHQ9 QUESTIONS 1 & 2: 0
SUM OF ALL RESPONSES TO PHQ QUESTIONS 1-9: 0

## 2023-12-11 NOTE — PROGRESS NOTES
Medicare Annual Wellness Visit    Itz Heard is here for Medicare AWV    Assessment & Plan   Medicare annual wellness visit, subsequent  Mixed hyperlipidemia  -     atorvastatin (LIPITOR) 40 MG tablet; Take 1 tablet by mouth daily, Disp-100 tablet, R-2Requesting 1 year supplyNormal  -     Comprehensive Metabolic Panel; Future  -     Lipid, Fasting; Future  -     TSH with Reflex; Future  Chronic pain of both knees  -     meloxicam (MOBIC) 7.5 MG tablet; Take 1 tablet by mouth daily, Disp-100 tablet, R-2Requesting 1 year supplyNormal  Chronic right shoulder pain  -     meloxicam (MOBIC) 7.5 MG tablet; Take 1 tablet by mouth daily, Disp-100 tablet, R-2Requesting 1 year supplyNormal  Chronic fatigue  -     CBC; Future    Recommendations for Preventive Services Due: see orders and patient instructions/AVS.  Recommended screening schedule for the next 5-10 years is provided to the patient in written form: see Patient Instructions/AVS.     No follow-ups on file. Subjective   The following acute and/or chronic problems were also addressed today:    81-year-old gentleman. He is retired from real estate and property management. . Has 4 daughters. 3 live locally. He has never smoked. He occasionally  drinks wine weekly. Hyperlipidemia  This is a chronic problem. The current episode started more than 1 year ago. The problem is controlled. Recent lipid tests were reviewed and are normal. There are no known factors aggravating his hyperlipidemia. Pertinent negatives include no myalgias. Current antihyperlipidemic treatment includes diet change (avoiding saturated fats), exercise (at home/free weights/aerobics)and statins. The current treatment provides significant improvement of lipids. There are no compliance problems. Risk factors for coronary artery disease include dyslipidemia and male sex. Atypical facial pain/paresthesia  Patient was seen by Dr. Oli Carias in October 2022.   He has

## 2023-12-11 NOTE — PATIENT INSTRUCTIONS
your use of this information. Personalized Preventive Plan for Yani Notice - 12/11/2023  Medicare offers a range of preventive health benefits. Some of the tests and screenings are paid in full while other may be subject to a deductible, co-insurance, and/or copay. Some of these benefits include a comprehensive review of your medical history including lifestyle, illnesses that may run in your family, and various assessments and screenings as appropriate. After reviewing your medical record and screening and assessments performed today your provider may have ordered immunizations, labs, imaging, and/or referrals for you. A list of these orders (if applicable) as well as your Preventive Care list are included within your After Visit Summary for your review. Other Preventive Recommendations:    A preventive eye exam performed by an eye specialist is recommended every 1-2 years to screen for glaucoma; cataracts, macular degeneration, and other eye disorders. A preventive dental visit is recommended every 6 months. Try to get at least 150 minutes of exercise per week or 10,000 steps per day on a pedometer . Order or download the FREE \"Exercise & Physical Activity: Your Everyday Guide\" from [employer]. Call 2-731.853.6582 or search [employer] online. You need 1372-9393 mg of calcium and 7425-8754 IU of vitamin D per day. It is possible to meet your calcium requirement with diet alone, but a vitamin D supplement is usually necessary to meet this goal.  When exposed to the sun, use a sunscreen that protects against both UVA and UVB radiation with an SPF of 30 or greater. Reapply every 2 to 3 hours or after sweating, drying off with a towel, or swimming. Always wear a seat belt when traveling in a car. Always wear a helmet when riding a bicycle or motorcycle.

## 2023-12-29 ENCOUNTER — TELEPHONE (OUTPATIENT)
Dept: INTERNAL MEDICINE CLINIC | Age: 78
End: 2023-12-29

## 2023-12-29 NOTE — TELEPHONE ENCOUNTER
Patient is requesting to speak with Dr. Mcmahon about an MRI he has been looking for from when he was a patient of Dr. Muse. MRI was of his head he isn't sure exactly when or where it was done it was about 12 to 18 months ago. Patient would like to look at the images his self to understand why nobody found the lump he has on head back then.    Please call and advise [phone]      Pt is aware  is out of the office and will wait for him to call him when back in the office.

## 2023-12-29 NOTE — TELEPHONE ENCOUNTER
Discussed at length on telephone.  It sounds like the patient had an MRI done at [facility] 10/2022 by Dr. Reyna.  Dr. Mcclendon was his PCP at that time. Please see if we can obtain the results.  Thank you.

## 2024-01-12 ENCOUNTER — TELEPHONE (OUTPATIENT)
Dept: INTERNAL MEDICINE CLINIC | Age: 79
End: 2024-01-12

## 2024-01-12 NOTE — TELEPHONE ENCOUNTER
----- Message from Miranda Cheung sent at 1/10/2024  2:57 PM EST -----  Subject: Appointment Request    Reason for Call: Established Patient Appointment needed: Semi-Routine Skin   Problem    QUESTIONS    Reason for appointment request? No appointments available during search     Additional Information for Provider? The patient called requesting to   schedule an appointment with PCP Gabriela Mcmahon in regard to an MRI he   had of a lump on the right side of his face. The patient did state there   is no pain however it is worsening and protruding. The patient would like   to know what to do next, what his options are.  ---------------------------------------------------------------------------  --------------  CALL BACK INFO  [phone]; OK to leave message on voicemail  ---------------------------------------------------------------------------  --------------  SCRIPT ANSWERS

## 2024-01-18 ENCOUNTER — OFFICE VISIT (OUTPATIENT)
Dept: INTERNAL MEDICINE CLINIC | Age: 79
End: 2024-01-18
Payer: MEDICARE

## 2024-01-18 VITALS
HEIGHT: 74 IN | DIASTOLIC BLOOD PRESSURE: 70 MMHG | HEART RATE: 61 BPM | WEIGHT: 193 LBS | OXYGEN SATURATION: 98 % | TEMPERATURE: 97.3 F | SYSTOLIC BLOOD PRESSURE: 140 MMHG | BODY MASS INDEX: 24.77 KG/M2

## 2024-01-18 DIAGNOSIS — D17.0 LIPOMA OF FACE: Primary | ICD-10-CM

## 2024-01-18 PROCEDURE — 99214 OFFICE O/P EST MOD 30 MIN: CPT | Performed by: HOSPITALIST

## 2024-01-18 PROCEDURE — 1123F ACP DISCUSS/DSCN MKR DOCD: CPT | Performed by: HOSPITALIST

## 2024-01-18 ASSESSMENT — PATIENT HEALTH QUESTIONNAIRE - PHQ9
SUM OF ALL RESPONSES TO PHQ9 QUESTIONS 1 & 2: 0
1. LITTLE INTEREST OR PLEASURE IN DOING THINGS: 0
SUM OF ALL RESPONSES TO PHQ QUESTIONS 1-9: 0
2. FEELING DOWN, DEPRESSED OR HOPELESS: 0

## 2024-01-18 ASSESSMENT — ENCOUNTER SYMPTOMS
BLOOD IN STOOL: 0
ABDOMINAL PAIN: 0
VOICE CHANGE: 0
NAUSEA: 0
VOMITING: 0
TROUBLE SWALLOWING: 0
ABDOMINAL DISTENTION: 0
SHORTNESS OF BREATH: 0
SINUS PAIN: 0
COUGH: 0
WHEEZING: 0
SORE THROAT: 0
SINUS PRESSURE: 0
DIARRHEA: 0
BACK PAIN: 0
CHEST TIGHTNESS: 0
CONSTIPATION: 0

## 2024-01-18 NOTE — PROGRESS NOTES
[facility]  Follow-up visit   2024    Juan Crabtree (:  1945) is a 78 y.o. 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  This has been present and stable since .  The patient was experiencing some facial paresthesias and numbness at the time and saw Dr. Reyna at [facility].  I obtained and reviewed the MRI of the brain which was obtained at the time.  This was unremarkable.  At the current time, the patient has no pain, or paresthesias.  We have elected to observe this for now.  I have told the patient that I will refer him to facial plastics for removal of the lesion if it changes in size or begins to cause any symptoms whatsoever.        ROS  Review of Systems   Constitutional:  Negative for activity change, appetite change, chills, diaphoresis, fatigue, fever and unexpected weight change.   HENT:  Negative for sinus pressure, sinus pain, sore throat, trouble swallowing and voice change.    Eyes:  Negative for visual disturbance.   Respiratory:  Negative for cough, chest tightness, shortness of breath and wheezing.    Cardiovascular:  Negative for chest pain, palpitations and leg swelling.   Gastrointestinal:  Negative for abdominal distention, abdominal pain, blood in stool, constipation, diarrhea, nausea and vomiting.   Endocrine: Negative for polydipsia and polyphagia.   Genitourinary:  Negative for decreased urine volume, difficulty urinating, dysuria and urgency.   Musculoskeletal:  Negative for back pain, gait problem, joint swelling and myalgias.   Neurological:  Negative for dizziness, seizures, syncope, light-headedness and headaches.   Psychiatric/Behavioral:  Negative for agitation, behavioral problems, confusion and suicidal ideas.        HISTORIES  Current Outpatient Medications on

## 2024-02-20 ENCOUNTER — TELEPHONE (OUTPATIENT)
Dept: INTERNAL MEDICINE CLINIC | Age: 79
End: 2024-02-20

## 2024-02-20 NOTE — TELEPHONE ENCOUNTER
Patient gave Dr. Mcmahon a disk of MRI images of his brain on 01/18/24.    He is asking us to fax these images to his dermatologist - Dr. Azul Juárez -  [facility] in [facility].  This doctor wants to view the images before she schedules his surgery.  Please send asa.    PH:  [phone]  ext. [phone]    FAX:  [phone]    Please notify patient when the images have been sent to Dr. Juárez.

## 2024-02-20 NOTE — TELEPHONE ENCOUNTER
Pt informed that we have no way of sending these images and he will need to  the disk.     Pt will come to office to get disk

## 2024-06-10 ENCOUNTER — OFFICE VISIT (OUTPATIENT)
Dept: INTERNAL MEDICINE CLINIC | Age: 79
End: 2024-06-10
Payer: MEDICARE

## 2024-06-10 VITALS
SYSTOLIC BLOOD PRESSURE: 124 MMHG | DIASTOLIC BLOOD PRESSURE: 80 MMHG | TEMPERATURE: 98.1 F | WEIGHT: 185 LBS | BODY MASS INDEX: 23.75 KG/M2

## 2024-06-10 DIAGNOSIS — L24.9 IRRITANT DERMATITIS: Primary | ICD-10-CM

## 2024-06-10 PROCEDURE — 1123F ACP DISCUSS/DSCN MKR DOCD: CPT | Performed by: STUDENT IN AN ORGANIZED HEALTH CARE EDUCATION/TRAINING PROGRAM

## 2024-06-10 PROCEDURE — 99213 OFFICE O/P EST LOW 20 MIN: CPT | Performed by: STUDENT IN AN ORGANIZED HEALTH CARE EDUCATION/TRAINING PROGRAM

## 2024-06-10 RX ORDER — TRIAMCINOLONE ACETONIDE 0.25 MG/G
CREAM TOPICAL
Qty: 15 G | Refills: 1 | Status: SHIPPED | OUTPATIENT
Start: 2024-06-10 | End: 2024-06-10

## 2024-06-10 RX ORDER — TRIAMCINOLONE ACETONIDE 0.25 MG/G
CREAM TOPICAL
Qty: 15 G | Refills: 1 | Status: SHIPPED | OUTPATIENT
Start: 2024-06-10 | End: 2024-06-12

## 2024-06-10 NOTE — ASSESSMENT & PLAN NOTE
Scrotal rash present x 1 week, 1 to 2 days after using antibacterial Dial soap on scrotum.  Rash is burning painful.  - Low potency steroid-Kenalog ordered-twice daily x 1 week  - Dermatology referral given  - Advised patient to spot test steroid prior to applying all over scrotum

## 2024-06-10 NOTE — PROGRESS NOTES
(6' 2\").    Weight as of this encounter: 83.9 kg (185 lb).    Health Maintenance Due   Topic Date Due    Shingles vaccine (1 of 2) Never done    Respiratory Syncytial Virus (RSV) Pregnant or age 60 yrs+ (1 - 1-dose 60+ series) Never done    DTaP/Tdap/Td vaccine (2 - Td or Tdap) 07/08/2021    COVID-19 Vaccine (4 - 2023-24 season) 09/01/2023    Annual Wellness Visit (Medicare Advantage)  01/01/2024        Physical Exam  Constitutional:       General: He is not in acute distress.     Appearance: He is not toxic-appearing.   HENT:      Head: Normocephalic.   Eyes:      General: No scleral icterus.     Conjunctiva/sclera: Conjunctivae normal.   Cardiovascular:      Rate and Rhythm: Normal rate and regular rhythm.      Pulses: Normal pulses.      Heart sounds: Normal heart sounds.   Pulmonary:      Effort: Pulmonary effort is normal. No respiratory distress.      Breath sounds: Normal breath sounds. No rales.   Abdominal:      General: There is no distension.      Tenderness: There is no abdominal tenderness.   Musculoskeletal:         General: No swelling.   Skin:     General: Skin is warm and dry.      Comments: Diffuse scrotal erythema/raw appearnce of skin. Layer of thin, moist drainage. No thick, yellow pus.    Neurological:      General: No focal deficit present.      Mental Status: He is alert and oriented to person, place, and time. Mental status is at baseline.      Gait: Gait normal.   Psychiatric:         Mood and Affect: Mood normal.         ASSESSMENT/PLAN:    Irritant dermatitis  Scrotal rash present x 1 week, 1 to 2 days after using antibacterial Dial soap on scrotum.  Rash is burning painful.  - Low potency steroid-Kenalog ordered-twice daily x 1 week  - Dermatology referral given  - Advised patient to spot test steroid prior to applying all over scrotum       Orders Placed This Encounter    External Referral To Dermatology     Referral Priority:   Routine     Referral Type:   Eval and Treat     Referral

## 2024-06-10 NOTE — PATIENT INSTRUCTIONS
[facility]  [address], [place] [address]  Telephone: [phone]  Fax: [phone]

## 2024-06-12 ENCOUNTER — HOSPITAL ENCOUNTER (EMERGENCY)
Age: 79
Discharge: HOME | End: 2024-06-12
Attending: EMERGENCY MEDICINE
Payer: MEDICARE

## 2024-06-12 VITALS
WEIGHT: 184.8 LBS | SYSTOLIC BLOOD PRESSURE: 138 MMHG | HEART RATE: 78 BPM | RESPIRATION RATE: 18 BRPM | DIASTOLIC BLOOD PRESSURE: 90 MMHG | TEMPERATURE: 98.2 F | OXYGEN SATURATION: 100 % | BODY MASS INDEX: 23.73 KG/M2

## 2024-06-12 DIAGNOSIS — L24.9 IRRITANT DERMATITIS: ICD-10-CM

## 2024-06-12 DIAGNOSIS — T21.16XD: Primary | ICD-10-CM

## 2024-06-12 PROCEDURE — 99283 EMERGENCY DEPT VISIT LOW MDM: CPT

## 2024-06-12 RX ORDER — TRIAMCINOLONE ACETONIDE 0.25 MG/G
CREAM TOPICAL
Qty: 15 G | Refills: 1 | Status: SHIPPED | OUTPATIENT
Start: 2024-06-12

## 2024-06-12 ASSESSMENT — PAIN DESCRIPTION - LOCATION: LOCATION: GROIN

## 2024-06-12 ASSESSMENT — PAIN DESCRIPTION - DESCRIPTORS: DESCRIPTORS: BURNING

## 2024-06-12 ASSESSMENT — PAIN - FUNCTIONAL ASSESSMENT: PAIN_FUNCTIONAL_ASSESSMENT: 0-10

## 2024-06-12 ASSESSMENT — PAIN SCALES - GENERAL: PAINLEVEL_OUTOF10: 6

## 2024-06-12 NOTE — ED PROVIDER NOTES
[facility] EMERGENCY DEPT     EMERGENCY DEPARTMENT ENCOUNTER            Pt Name: Juan Crabtree   MRN: [id number]   Birthdate 1945   Date of evaluation: 6/12/2024   Provider: Yasmany Condon MD   PCP: Gabriela Mcmahon MD   Note Started: 1:08 PM EDT 6/12/24          CHIEF COMPLAINT     Chief Complaint   Patient presents with    Rash     Private area has rash for 1 week. Was using Dial soap for cancer surgery 2 weeks ago.              HISTORY OF PRESENT ILLNESS:   History from : Patient   Limitations to history : None     Juan Crabtree is a 78 y.o. male who presents for reevaluation of irritation to scrotum that he has had for approximately 5 days.  The patient initially applied some sort of antibacterial soap to his scrotum noting that his scrotum became red excoriated irritated and painful following this.  He Decon himself by rinsing it off and was evaluated 2 days ago by primary care and prescribed steroid cream which the patient thinks is helping his condition.  His primary care physician referred him to dermatology but he is not yet been evaluated.  He denies dysuria, fever or chills.  He does not have pain.  There is no difficulty voiding.    Nursing Notes were all reviewed and agreed with, or any disagreements were addressed in the HPI.     REVIEW OF SYSTEMS :    Positives and Pertinent negatives as per HPI.      MEDICAL HISTORY   has a past medical history of Cephalgia, Hyperlipidemia, and Left knee pain.    Past Surgical History:   Procedure Laterality Date    COLONOSCOPY  3/2013    normal Dr Anderson repeat 2023    KNEE CARTILAGE SURGERY  1980\"S    LEFT KNEE    PAIN MANAGEMENT PROCEDURE Left 9/2/2020    LEFT C3, C4, C5 AND C6 MEDIAL BRANCH BLOCKS WITH STEROID WITH FLUOROSCOPY (76696, [id number])  #1 performed by Heidi Maharaj MD at [facility]    PATELLA SURGERY  1980'S    LEFT    TONSILLECTOMY AND ADENOIDECTOMY  1952      CURRENTMEDICATIONS       Current Discharge Medication List

## 2025-01-12 DIAGNOSIS — M25.511 CHRONIC RIGHT SHOULDER PAIN: ICD-10-CM

## 2025-01-12 DIAGNOSIS — M25.561 CHRONIC PAIN OF BOTH KNEES: ICD-10-CM

## 2025-01-12 DIAGNOSIS — G89.29 CHRONIC PAIN OF BOTH KNEES: ICD-10-CM

## 2025-01-12 DIAGNOSIS — G89.29 CHRONIC RIGHT SHOULDER PAIN: ICD-10-CM

## 2025-01-12 DIAGNOSIS — E78.2 MIXED HYPERLIPIDEMIA: ICD-10-CM

## 2025-01-12 DIAGNOSIS — M25.562 CHRONIC PAIN OF BOTH KNEES: ICD-10-CM

## 2025-01-13 RX ORDER — ATORVASTATIN CALCIUM 40 MG/1
40 TABLET, FILM COATED ORAL DAILY
Qty: 100 TABLET | Refills: 2 | Status: SHIPPED | OUTPATIENT
Start: 2025-01-13

## 2025-01-13 RX ORDER — MELOXICAM 7.5 MG/1
7.5 TABLET ORAL DAILY
Qty: 100 TABLET | Refills: 2 | Status: SHIPPED | OUTPATIENT
Start: 2025-01-13

## (undated) DEVICE — GAUZE,SPONGE,4"X4",8PLY,STRL,LF,10/TRAY: Brand: MEDLINE

## (undated) DEVICE — STANDARD HYPODERMIC NEEDLE,POLYPROPYLENE HUB: Brand: MONOJECT

## (undated) DEVICE — CHLORAPREP 26ML ORANGE

## (undated) DEVICE — MEDIA CONTRAST RX ISOVUE-300 61% 30ML VIALS

## (undated) DEVICE — NEEDLE SPNL 22GA L2.5IN BLK QNCKE BVL DISP

## (undated) DEVICE — SYRINGE MED 10ML LUERLOCK TIP W/O SFTY DISP

## (undated) DEVICE — STERILE POLYISOPRENE POWDER-FREE SURGICAL GLOVES: Brand: PROTEXIS

## (undated) DEVICE — SET EXTN 0.4ML L7IN MINIBOR PRSS RATE W/ SLDE CLMP SPIN M

## (undated) DEVICE — PEN: MARKING STD 100/CS: Brand: MEDICAL ACTION INDUSTRIES

## (undated) DEVICE — DISPOSABLE OR TOWEL: Brand: CARDINAL HEALTH

## (undated) DEVICE — UNIVERSAL BLOCK TRAY: Brand: AVANOS*

## (undated) DEVICE — Device: Brand: JELCO

## (undated) DEVICE — SET EXTN L7IN PRIMING VOL 0.5ML PRSS RATE STD BOR 1 REM